# Patient Record
Sex: MALE | Race: WHITE | NOT HISPANIC OR LATINO | Employment: OTHER | URBAN - METROPOLITAN AREA
[De-identification: names, ages, dates, MRNs, and addresses within clinical notes are randomized per-mention and may not be internally consistent; named-entity substitution may affect disease eponyms.]

---

## 2017-07-25 ENCOUNTER — HOSPITAL ENCOUNTER (OUTPATIENT)
Dept: CARDIOLOGY | Facility: HOSPITAL | Age: 77
Discharge: HOME | End: 2017-07-25
Attending: ANESTHESIOLOGY | Admitting: ANESTHESIOLOGY

## 2017-07-25 LAB
ABO + RH BLD: NORMAL
ANION GAP SERPL CALC-SCNC: 13.2 MMOL/L (ref 10–20)
ARMBAND: NORMAL
BASOPHILS # BLD AUTO: 0 10*3/UL (ref 0–0.2)
BASOPHILS NFR BLD AUTO: 0 % (ref 0–2)
BLD COMPONENT TYPE: NORMAL
BLD GP AB SCN SERPL QL: NEGATIVE
BUN SERPL-MCNC: 16 MG/DL (ref 8–20)
BUN/CREAT SERPL: 20 (ref 6.2–20.3)
CALCIUM SERPL-MCNC: 9.3 MG/DL (ref 8.9–10.3)
CHLORIDE SERPL-SCNC: 104 MMOL/L (ref 101–111)
CONV CO2: 26 MMOL/L (ref 22–32)
CREAT UR-MCNC: 0.8 MG/DL (ref 0.7–1.2)
CROSSMATCH EXPIRATION: NORMAL
DIFFERENTIAL METHOD BLD: (no result)
EOSINOPHIL # BLD AUTO: 0.1 10*3/UL (ref 0–0.3)
EOSINOPHIL # BLD AUTO: 2 % (ref 0–3)
ERYTHROCYTE [DISTWIDTH] IN BLOOD BY AUTOMATED COUNT: 12.9 % (ref 11.5–14.5)
GLUCOSE SERPL-MCNC: 187 MG/DL (ref 65–99)
HCT VFR BLD AUTO: 42 % (ref 40–54)
HGB BLD-MCNC: 14.3 G/DL (ref 14–18)
LYMPHOCYTES # BLD AUTO: 0.9 10*3/UL (ref 0.8–4.8)
LYMPHOCYTES NFR BLD AUTO: 17 % (ref 18–42)
MCH RBC QN AUTO: 32.1 PG (ref 26–32)
MCHC RBC AUTO-ENTMCNC: 34 G/DL (ref 32–36)
MCV RBC AUTO: 94.4 FL (ref 80–94)
MONOCYTES # BLD AUTO: 0.6 10*3/UL (ref 0.1–1.3)
MONOCYTES NFR BLD AUTO: 10 % (ref 2–11)
NEUTROPHILS # BLD AUTO: 3.8 10*3/UL (ref 2.3–8.6)
NEUTROPHILS NFR BLD AUTO: 71 % (ref 50–75)
NRBC BLD AUTO-RTO: 0 /100{WBCS}
NRBC/RBC NFR BLD MANUAL: 0 10*3/UL
PLATELET # BLD AUTO: 178 10*3/UL (ref 150–450)
PMV BLD AUTO: 8.8 FL (ref 7.4–10.4)
POTASSIUM SERPL-SCNC: 4.2 MMOL/L (ref 3.6–5.1)
RBC # BLD AUTO: 4.45 10*6/UL (ref 4.6–6)
SODIUM SERPL-SCNC: 139 MMOL/L (ref 136–144)
WBC # BLD AUTO: 5.4 10*3/UL (ref 4.5–11.5)

## 2017-08-01 ENCOUNTER — HOSPITAL ENCOUNTER (OUTPATIENT)
Dept: OTHER | Facility: HOSPITAL | Age: 77
Setting detail: SPECIMEN
Discharge: HOME | End: 2017-08-01
Attending: UROLOGY | Admitting: UROLOGY

## 2017-08-02 ENCOUNTER — HOSPITAL ENCOUNTER (OUTPATIENT)
Dept: OTHER | Facility: HOSPITAL | Age: 77
Setting detail: SPECIMEN
Discharge: HOME | End: 2017-08-02
Attending: UROLOGY | Admitting: UROLOGY

## 2017-08-02 LAB — SPECIMEN SOURCE: NORMAL

## 2017-12-12 ENCOUNTER — OFFICE (AMBULATORY)
Dept: URBAN - METROPOLITAN AREA CLINIC 64 | Facility: CLINIC | Age: 77
End: 2017-12-12
Payer: COMMERCIAL

## 2017-12-12 VITALS
WEIGHT: 243 LBS | HEIGHT: 76 IN | DIASTOLIC BLOOD PRESSURE: 90 MMHG | HEART RATE: 74 BPM | SYSTOLIC BLOOD PRESSURE: 162 MMHG

## 2017-12-12 DIAGNOSIS — R19.4 CHANGE IN BOWEL HABIT: ICD-10-CM

## 2017-12-12 DIAGNOSIS — R33.9 RETENTION OF URINE, UNSPECIFIED: ICD-10-CM

## 2017-12-12 PROCEDURE — 99213 OFFICE O/P EST LOW 20 MIN: CPT | Performed by: NURSE PRACTITIONER

## 2018-02-12 ENCOUNTER — OFFICE (AMBULATORY)
Dept: URBAN - METROPOLITAN AREA CLINIC 64 | Facility: CLINIC | Age: 78
End: 2018-02-12
Payer: COMMERCIAL

## 2018-02-12 VITALS
WEIGHT: 245 LBS | HEIGHT: 76 IN | DIASTOLIC BLOOD PRESSURE: 86 MMHG | SYSTOLIC BLOOD PRESSURE: 136 MMHG | HEART RATE: 83 BPM

## 2018-02-12 DIAGNOSIS — R19.4 CHANGE IN BOWEL HABIT: ICD-10-CM

## 2018-02-12 PROCEDURE — 99213 OFFICE O/P EST LOW 20 MIN: CPT | Performed by: NURSE PRACTITIONER

## 2020-01-22 ENCOUNTER — OFFICE (AMBULATORY)
Dept: URBAN - METROPOLITAN AREA CLINIC 64 | Facility: CLINIC | Age: 80
End: 2020-01-22
Payer: COMMERCIAL

## 2020-01-22 VITALS
DIASTOLIC BLOOD PRESSURE: 81 MMHG | SYSTOLIC BLOOD PRESSURE: 163 MMHG | WEIGHT: 239 LBS | HEIGHT: 76 IN | HEART RATE: 96 BPM

## 2020-01-22 DIAGNOSIS — R19.4 CHANGE IN BOWEL HABIT: ICD-10-CM

## 2020-01-22 DIAGNOSIS — Z86.010 PERSONAL HISTORY OF COLONIC POLYPS: ICD-10-CM

## 2020-01-22 DIAGNOSIS — R93.3 ABNORMAL FINDINGS ON DIAGNOSTIC IMAGING OF OTHER PARTS OF DI: ICD-10-CM

## 2020-01-22 PROCEDURE — 99214 OFFICE O/P EST MOD 30 MIN: CPT | Performed by: NURSE PRACTITIONER

## 2020-01-22 RX ORDER — RIFAXIMIN 550 MG/1
1650 TABLET ORAL
Qty: 42 | Refills: 0 | Status: COMPLETED
Start: 2020-01-22 | End: 2022-01-28

## 2020-02-13 ENCOUNTER — OFFICE (AMBULATORY)
Dept: URBAN - METROPOLITAN AREA PATHOLOGY 4 | Facility: PATHOLOGY | Age: 80
End: 2020-02-13
Payer: MEDICARE

## 2020-02-13 ENCOUNTER — OFFICE (AMBULATORY)
Dept: URBAN - METROPOLITAN AREA PATHOLOGY 4 | Facility: PATHOLOGY | Age: 80
End: 2020-02-13

## 2020-02-13 ENCOUNTER — ON CAMPUS - OUTPATIENT (AMBULATORY)
Dept: URBAN - METROPOLITAN AREA HOSPITAL 2 | Facility: HOSPITAL | Age: 80
End: 2020-02-13
Payer: COMMERCIAL

## 2020-02-13 VITALS
DIASTOLIC BLOOD PRESSURE: 58 MMHG | SYSTOLIC BLOOD PRESSURE: 129 MMHG | DIASTOLIC BLOOD PRESSURE: 72 MMHG | HEART RATE: 89 BPM | HEIGHT: 76 IN | RESPIRATION RATE: 18 BRPM | DIASTOLIC BLOOD PRESSURE: 59 MMHG | HEART RATE: 94 BPM | DIASTOLIC BLOOD PRESSURE: 65 MMHG | HEART RATE: 90 BPM | DIASTOLIC BLOOD PRESSURE: 56 MMHG | SYSTOLIC BLOOD PRESSURE: 103 MMHG | HEART RATE: 91 BPM | WEIGHT: 233 LBS | SYSTOLIC BLOOD PRESSURE: 93 MMHG | OXYGEN SATURATION: 94 % | HEART RATE: 86 BPM | SYSTOLIC BLOOD PRESSURE: 92 MMHG | HEART RATE: 82 BPM | SYSTOLIC BLOOD PRESSURE: 99 MMHG | HEART RATE: 95 BPM | HEART RATE: 70 BPM | RESPIRATION RATE: 16 BRPM | DIASTOLIC BLOOD PRESSURE: 52 MMHG | SYSTOLIC BLOOD PRESSURE: 151 MMHG | DIASTOLIC BLOOD PRESSURE: 76 MMHG | DIASTOLIC BLOOD PRESSURE: 69 MMHG | OXYGEN SATURATION: 92 % | DIASTOLIC BLOOD PRESSURE: 73 MMHG | OXYGEN SATURATION: 96 % | SYSTOLIC BLOOD PRESSURE: 85 MMHG | TEMPERATURE: 97.6 F | OXYGEN SATURATION: 95 %

## 2020-02-13 DIAGNOSIS — K57.30 DIVERTICULOSIS OF LARGE INTESTINE WITHOUT PERFORATION OR ABS: ICD-10-CM

## 2020-02-13 DIAGNOSIS — K29.60 OTHER GASTRITIS WITHOUT BLEEDING: ICD-10-CM

## 2020-02-13 DIAGNOSIS — R19.4 CHANGE IN BOWEL HABIT: ICD-10-CM

## 2020-02-13 DIAGNOSIS — K21.0 GASTRO-ESOPHAGEAL REFLUX DISEASE WITH ESOPHAGITIS: ICD-10-CM

## 2020-02-13 DIAGNOSIS — R93.3 ABNORMAL FINDINGS ON DIAGNOSTIC IMAGING OF OTHER PARTS OF DI: ICD-10-CM

## 2020-02-13 DIAGNOSIS — K44.9 DIAPHRAGMATIC HERNIA WITHOUT OBSTRUCTION OR GANGRENE: ICD-10-CM

## 2020-02-13 DIAGNOSIS — Z86.010 PERSONAL HISTORY OF COLONIC POLYPS: ICD-10-CM

## 2020-02-13 PROBLEM — K22.70 BARRETT'S ESOPHAGUS WITHOUT DYSPLASIA: Status: ACTIVE | Noted: 2020-02-13

## 2020-02-13 PROBLEM — K29.70 GASTRITIS, UNSPECIFIED, WITHOUT BLEEDING: Status: ACTIVE | Noted: 2020-02-13

## 2020-02-13 LAB
GI HISTOLOGY: A. SELECT: (no result)
GI HISTOLOGY: B. UNSPECIFIED: (no result)
GI HISTOLOGY: PDF REPORT: (no result)

## 2020-02-13 PROCEDURE — 88305 TISSUE EXAM BY PATHOLOGIST: CPT | Mod: 26 | Performed by: INTERNAL MEDICINE

## 2020-02-13 PROCEDURE — 88305 TISSUE EXAM BY PATHOLOGIST: CPT | Performed by: INTERNAL MEDICINE

## 2020-02-13 PROCEDURE — 45378 DIAGNOSTIC COLONOSCOPY: CPT | Performed by: INTERNAL MEDICINE

## 2020-02-13 PROCEDURE — 43239 EGD BIOPSY SINGLE/MULTIPLE: CPT | Performed by: INTERNAL MEDICINE

## 2020-02-13 RX ORDER — PANTOPRAZOLE SODIUM 20 MG/1
20 TABLET, DELAYED RELEASE ORAL
Qty: 90 | Refills: 3 | Status: ACTIVE
Start: 2020-02-13

## 2020-03-17 PROCEDURE — 82365 CALCULUS SPECTROSCOPY: CPT

## 2020-03-18 ENCOUNTER — LAB REQUISITION (OUTPATIENT)
Dept: LAB | Facility: HOSPITAL | Age: 80
End: 2020-03-18

## 2020-03-18 DIAGNOSIS — N20.1 CALCULUS OF URETER: ICD-10-CM

## 2020-04-01 LAB
COLOR STONE: NORMAL
COMPN STONE: NORMAL
LABORATORY COMMENT REPORT: NORMAL
Lab: NORMAL
Lab: NORMAL
PHOTO: NORMAL
SIZE STONE: NORMAL MM
SPECIMEN SOURCE: NORMAL
URATE MFR STONE: 100 %
WT STONE: 260 MG

## 2020-04-02 ENCOUNTER — OFFICE (AMBULATORY)
Dept: URBAN - METROPOLITAN AREA TELEHEALTH 4 | Facility: TELEHEALTH | Age: 80
End: 2020-04-02
Payer: COMMERCIAL

## 2020-04-02 VITALS — HEIGHT: 76 IN

## 2020-04-02 DIAGNOSIS — R93.3 ABNORMAL FINDINGS ON DIAGNOSTIC IMAGING OF OTHER PARTS OF DI: ICD-10-CM

## 2020-04-02 DIAGNOSIS — K59.1 FUNCTIONAL DIARRHEA: ICD-10-CM

## 2020-04-02 PROCEDURE — G2012 BRIEF CHECK IN BY MD/QHP: HCPCS | Performed by: INTERNAL MEDICINE

## 2021-09-28 NOTE — PROGRESS NOTES
EMG and Nerve Conduction Studies    The complete report includes the data sheets.     Date of Study:9-    Referring Provider:Dr. Patino    History:    This patient is a 80-year-old male with a history of non-insulin-dependent diabetes who complains of numbness in both feet up to about mid calf.      Results:    1.  The sural sensory study was attempted bilaterally in the medial plantar sensory study on the right no nerve action potentials were recorded.    2.  The right peroneal motor distal latency and conduction velocities and amplitudes were normal.  The left peroneal motor distal latency is prolonged with markedly reduced compound muscle potential amplitudes with normal conduction velocities.    3.  The tibial motor distal latencies were normal bilaterally the compound muscle action potential amplitude was mildly reduced on the right and markedly reduced on the left with essentially normal conduction velocity.    4.  The F-wave latencies were prolonged on the left tibial and bilateral peroneal studies however the patient is 6 foot 4 inches tall therefore this is of questionable clinical significance.    5.  EMG of the vastus lateralis muscles were normal bilaterally.  There were neurogenic changes in the muscles in the leg with increased insertional activity occasional spontaneous potentials and decreased recruitment.  The extensor digitorum brevis muscles showed significant neurogenic change bilaterally.    Impression:    This is an abnormal study.  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  September 30, 2021

## 2021-09-30 ENCOUNTER — PROCEDURE VISIT (OUTPATIENT)
Dept: NEUROLOGY | Facility: CLINIC | Age: 81
End: 2021-09-30

## 2021-09-30 VITALS
HEART RATE: 70 BPM | TEMPERATURE: 97.3 F | HEIGHT: 75 IN | BODY MASS INDEX: 28.6 KG/M2 | WEIGHT: 230 LBS | DIASTOLIC BLOOD PRESSURE: 75 MMHG | SYSTOLIC BLOOD PRESSURE: 177 MMHG

## 2021-09-30 DIAGNOSIS — G60.8 POLYNEUROPATHY, PERIPHERAL SENSORIMOTOR AXONAL: Primary | ICD-10-CM

## 2021-09-30 PROCEDURE — 95910 NRV CNDJ TEST 7-8 STUDIES: CPT | Performed by: PSYCHIATRY & NEUROLOGY

## 2021-09-30 PROCEDURE — 95886 MUSC TEST DONE W/N TEST COMP: CPT | Performed by: PSYCHIATRY & NEUROLOGY

## 2021-09-30 RX ORDER — MELOXICAM 15 MG/1
15 TABLET ORAL DAILY
Status: ON HOLD | COMMUNITY
End: 2022-12-07

## 2021-09-30 RX ORDER — ASPIRIN 81 MG/1
81 TABLET, CHEWABLE ORAL DAILY
Status: ON HOLD | COMMUNITY
End: 2022-12-07

## 2021-09-30 RX ORDER — OMEPRAZOLE 20 MG/1
20 CAPSULE, DELAYED RELEASE ORAL DAILY
Status: ON HOLD | COMMUNITY
End: 2022-12-07

## 2021-09-30 RX ORDER — ATENOLOL 25 MG/1
25 TABLET ORAL DAILY
Status: ON HOLD | COMMUNITY
End: 2022-12-07

## 2021-09-30 RX ORDER — GABAPENTIN 300 MG/1
600 CAPSULE ORAL 2 TIMES DAILY
COMMUNITY

## 2021-09-30 RX ORDER — GLIMEPIRIDE 2 MG/1
2 TABLET ORAL DAILY
COMMUNITY
Start: 2013-03-21

## 2022-01-28 ENCOUNTER — OFFICE (AMBULATORY)
Dept: URBAN - METROPOLITAN AREA CLINIC 64 | Facility: CLINIC | Age: 82
End: 2022-01-28

## 2022-01-28 VITALS
DIASTOLIC BLOOD PRESSURE: 81 MMHG | SYSTOLIC BLOOD PRESSURE: 132 MMHG | HEIGHT: 76 IN | WEIGHT: 237 LBS | HEART RATE: 74 BPM

## 2022-01-28 DIAGNOSIS — R15.2 FECAL URGENCY: ICD-10-CM

## 2022-01-28 PROCEDURE — 99213 OFFICE O/P EST LOW 20 MIN: CPT | Performed by: NURSE PRACTITIONER

## 2022-01-28 RX ORDER — METRONIDAZOLE 500 MG/1
1500 TABLET, FILM COATED ORAL
Qty: 30 | Refills: 0 | Status: ACTIVE
Start: 2022-01-28

## 2022-03-25 ENCOUNTER — OFFICE (AMBULATORY)
Dept: URBAN - METROPOLITAN AREA CLINIC 64 | Facility: CLINIC | Age: 82
End: 2022-03-25

## 2022-03-25 VITALS
SYSTOLIC BLOOD PRESSURE: 175 MMHG | HEART RATE: 69 BPM | WEIGHT: 242 LBS | HEIGHT: 76 IN | DIASTOLIC BLOOD PRESSURE: 102 MMHG

## 2022-03-25 DIAGNOSIS — R15.2 FECAL URGENCY: ICD-10-CM

## 2022-03-25 PROCEDURE — 99213 OFFICE O/P EST LOW 20 MIN: CPT | Performed by: NURSE PRACTITIONER

## 2022-05-24 ENCOUNTER — TRANSCRIBE ORDERS (OUTPATIENT)
Dept: ADMINISTRATIVE | Facility: HOSPITAL | Age: 82
End: 2022-05-24

## 2022-05-24 ENCOUNTER — HOSPITAL ENCOUNTER (OUTPATIENT)
Dept: CARDIOLOGY | Facility: HOSPITAL | Age: 82
Discharge: HOME | End: 2022-05-24

## 2022-05-24 ENCOUNTER — LAB (OUTPATIENT)
Dept: LAB | Facility: HOSPITAL | Age: 82
End: 2022-05-24

## 2022-05-24 DIAGNOSIS — M16.12 PRIMARY OSTEOARTHRITIS OF LEFT HIP: ICD-10-CM

## 2022-05-24 DIAGNOSIS — Z01.812 PRE-OPERATIVE LABORATORY EXAMINATION: ICD-10-CM

## 2022-05-24 DIAGNOSIS — M16.12 PRIMARY OSTEOARTHRITIS OF LEFT HIP: Primary | ICD-10-CM

## 2022-05-24 LAB
ANION GAP SERPL CALCULATED.3IONS-SCNC: 11.2 MMOL/L (ref 5–15)
BASOPHILS # BLD AUTO: 0.03 10*3/MM3 (ref 0–0.2)
BASOPHILS NFR BLD AUTO: 0.5 % (ref 0–1.5)
BUN SERPL-MCNC: 20 MG/DL (ref 8–23)
BUN/CREAT SERPL: 13.7 (ref 7–25)
CALCIUM SPEC-SCNC: 9.6 MG/DL (ref 8.6–10.5)
CHLORIDE SERPL-SCNC: 102 MMOL/L (ref 98–107)
CO2 SERPL-SCNC: 25.8 MMOL/L (ref 22–29)
CREAT SERPL-MCNC: 1.46 MG/DL (ref 0.76–1.27)
DEPRECATED RDW RBC AUTO: 45.3 FL (ref 37–54)
EGFRCR SERPLBLD CKD-EPI 2021: 48.6 ML/MIN/1.73
EOSINOPHIL # BLD AUTO: 0.07 10*3/MM3 (ref 0–0.4)
EOSINOPHIL NFR BLD AUTO: 1.2 % (ref 0.3–6.2)
ERYTHROCYTE [DISTWIDTH] IN BLOOD BY AUTOMATED COUNT: 12.5 % (ref 12.3–15.4)
GLUCOSE SERPL-MCNC: 181 MG/DL (ref 65–99)
HCT VFR BLD AUTO: 42.5 % (ref 37.5–51)
HGB BLD-MCNC: 13.9 G/DL (ref 13–17.7)
IMM GRANULOCYTES # BLD AUTO: 0.02 10*3/MM3 (ref 0–0.05)
IMM GRANULOCYTES NFR BLD AUTO: 0.4 % (ref 0–0.5)
LYMPHOCYTES # BLD AUTO: 1.08 10*3/MM3 (ref 0.7–3.1)
LYMPHOCYTES NFR BLD AUTO: 18.9 % (ref 19.6–45.3)
MCH RBC QN AUTO: 31.7 PG (ref 26.6–33)
MCHC RBC AUTO-ENTMCNC: 32.7 G/DL (ref 31.5–35.7)
MCV RBC AUTO: 96.8 FL (ref 79–97)
MONOCYTES # BLD AUTO: 0.62 10*3/MM3 (ref 0.1–0.9)
MONOCYTES NFR BLD AUTO: 10.9 % (ref 5–12)
NEUTROPHILS NFR BLD AUTO: 3.88 10*3/MM3 (ref 1.7–7)
NEUTROPHILS NFR BLD AUTO: 68.1 % (ref 42.7–76)
NRBC BLD AUTO-RTO: 0 /100 WBC (ref 0–0.2)
PLATELET # BLD AUTO: 207 10*3/MM3 (ref 140–450)
PMV BLD AUTO: 10.9 FL (ref 6–12)
POTASSIUM SERPL-SCNC: 4.5 MMOL/L (ref 3.5–5.2)
RBC # BLD AUTO: 4.39 10*6/MM3 (ref 4.14–5.8)
SODIUM SERPL-SCNC: 139 MMOL/L (ref 136–145)
WBC NRBC COR # BLD: 5.7 10*3/MM3 (ref 3.4–10.8)

## 2022-05-24 PROCEDURE — 80048 BASIC METABOLIC PNL TOTAL CA: CPT

## 2022-05-24 PROCEDURE — 93005 ELECTROCARDIOGRAM TRACING: CPT | Performed by: ORTHOPAEDIC SURGERY

## 2022-05-24 PROCEDURE — 85025 COMPLETE CBC W/AUTO DIFF WBC: CPT

## 2022-05-24 PROCEDURE — 36415 COLL VENOUS BLD VENIPUNCTURE: CPT

## 2022-05-24 PROCEDURE — 93010 ELECTROCARDIOGRAM REPORT: CPT | Performed by: INTERNAL MEDICINE

## 2022-05-25 LAB — QT INTERVAL: 389 MS

## 2022-12-07 ENCOUNTER — HOSPITAL ENCOUNTER (INPATIENT)
Facility: HOSPITAL | Age: 82
LOS: 2 days | Discharge: HOME | End: 2022-12-09
Attending: EMERGENCY MEDICINE | Admitting: INTERNAL MEDICINE

## 2022-12-07 ENCOUNTER — APPOINTMENT (OUTPATIENT)
Dept: GENERAL RADIOLOGY | Facility: HOSPITAL | Age: 82
End: 2022-12-07

## 2022-12-07 DIAGNOSIS — I21.4 NSTEMI (NON-ST ELEVATED MYOCARDIAL INFARCTION): Primary | ICD-10-CM

## 2022-12-07 LAB
ALBUMIN SERPL-MCNC: 4.38 G/DL (ref 3.5–5.2)
ALBUMIN/GLOB SERPL: 1.4 G/DL
ALP SERPL-CCNC: 78 U/L (ref 39–117)
ALT SERPL W P-5'-P-CCNC: 18 U/L (ref 1–41)
ANION GAP SERPL CALCULATED.3IONS-SCNC: 9.2 MMOL/L (ref 5–15)
APTT PPP: 27.8 SECONDS (ref 61–76.5)
APTT PPP: 33 SECONDS (ref 61–76.5)
AST SERPL-CCNC: 25 U/L (ref 1–40)
BASOPHILS # BLD AUTO: 0.04 10*3/MM3 (ref 0–0.2)
BASOPHILS NFR BLD AUTO: 0.6 % (ref 0–1.5)
BILIRUB SERPL-MCNC: 0.6 MG/DL (ref 0–1.2)
BUN SERPL-MCNC: 22 MG/DL (ref 8–23)
BUN/CREAT SERPL: 14.9 (ref 7–25)
CALCIUM SPEC-SCNC: 9.7 MG/DL (ref 8.6–10.5)
CHLORIDE SERPL-SCNC: 101 MMOL/L (ref 98–107)
CO2 SERPL-SCNC: 26.8 MMOL/L (ref 22–29)
CREAT SERPL-MCNC: 1.48 MG/DL (ref 0.76–1.27)
DEPRECATED RDW RBC AUTO: 46.1 FL (ref 37–54)
EGFRCR SERPLBLD CKD-EPI 2021: 47.5 ML/MIN/1.73
EOSINOPHIL # BLD AUTO: 0.11 10*3/MM3 (ref 0–0.4)
EOSINOPHIL NFR BLD AUTO: 1.8 % (ref 0.3–6.2)
ERYTHROCYTE [DISTWIDTH] IN BLOOD BY AUTOMATED COUNT: 14.4 % (ref 12.3–15.4)
GLOBULIN UR ELPH-MCNC: 3.2 GM/DL
GLUCOSE BLDC GLUCOMTR-MCNC: 91 MG/DL (ref 70–105)
GLUCOSE SERPL-MCNC: 204 MG/DL (ref 65–99)
HCT VFR BLD AUTO: 42 % (ref 37.5–51)
HGB BLD-MCNC: 14.2 G/DL (ref 13–17.7)
IMM GRANULOCYTES # BLD AUTO: 0 10*3/MM3 (ref 0–0.05)
IMM GRANULOCYTES NFR BLD AUTO: 0 % (ref 0–0.5)
INR PPP: 1.06 (ref 0.93–1.1)
LYMPHOCYTES # BLD AUTO: 1.09 10*3/MM3 (ref 0.7–3.1)
LYMPHOCYTES NFR BLD AUTO: 17.4 % (ref 19.6–45.3)
MCH RBC QN AUTO: 29.5 PG (ref 26.6–33)
MCHC RBC AUTO-ENTMCNC: 33.8 G/DL (ref 31.5–35.7)
MCV RBC AUTO: 87.1 FL (ref 79–97)
MONOCYTES # BLD AUTO: 0.83 10*3/MM3 (ref 0.1–0.9)
MONOCYTES NFR BLD AUTO: 13.3 % (ref 5–12)
NEUTROPHILS NFR BLD AUTO: 4.18 10*3/MM3 (ref 1.7–7)
NEUTROPHILS NFR BLD AUTO: 66.9 % (ref 42.7–76)
PLATELET # BLD AUTO: 216 10*3/MM3 (ref 140–450)
PMV BLD AUTO: 10.3 FL (ref 6–12)
POTASSIUM SERPL-SCNC: 4.3 MMOL/L (ref 3.5–5.2)
PROT SERPL-MCNC: 7.6 G/DL (ref 6–8.5)
PROTHROMBIN TIME: 10.9 SECONDS (ref 9.6–11.7)
QT INTERVAL: 398 MS
RBC # BLD AUTO: 4.82 10*6/MM3 (ref 4.14–5.8)
SODIUM SERPL-SCNC: 137 MMOL/L (ref 136–145)
TROPONIN T SERPL-MCNC: 0.32 NG/ML (ref 0–0.03)
TROPONIN T SERPL-MCNC: 0.37 NG/ML (ref 0–0.03)
TSH SERPL DL<=0.05 MIU/L-ACNC: 1.2 UIU/ML (ref 0.27–4.2)
WBC NRBC COR # BLD: 6.25 10*3/MM3 (ref 3.4–10.8)

## 2022-12-07 PROCEDURE — 85730 THROMBOPLASTIN TIME PARTIAL: CPT | Performed by: EMERGENCY MEDICINE

## 2022-12-07 PROCEDURE — 93010 ELECTROCARDIOGRAM REPORT: CPT | Performed by: EMERGENCY MEDICINE

## 2022-12-07 PROCEDURE — 80053 COMPREHEN METABOLIC PANEL: CPT | Performed by: EMERGENCY MEDICINE

## 2022-12-07 PROCEDURE — 85025 COMPLETE CBC W/AUTO DIFF WBC: CPT

## 2022-12-07 PROCEDURE — 84443 ASSAY THYROID STIM HORMONE: CPT | Performed by: INTERNAL MEDICINE

## 2022-12-07 PROCEDURE — 99285 EMERGENCY DEPT VISIT HI MDM: CPT | Performed by: EMERGENCY MEDICINE

## 2022-12-07 PROCEDURE — 84484 ASSAY OF TROPONIN QUANT: CPT | Performed by: EMERGENCY MEDICINE

## 2022-12-07 PROCEDURE — 25010000002 HEPARIN (PORCINE) 25000-0.45 UT/250ML-% SOLUTION: Performed by: EMERGENCY MEDICINE

## 2022-12-07 PROCEDURE — 71045 X-RAY EXAM CHEST 1 VIEW: CPT

## 2022-12-07 PROCEDURE — 36415 COLL VENOUS BLD VENIPUNCTURE: CPT

## 2022-12-07 PROCEDURE — 99284 EMERGENCY DEPT VISIT MOD MDM: CPT

## 2022-12-07 PROCEDURE — 99223 1ST HOSP IP/OBS HIGH 75: CPT | Performed by: INTERNAL MEDICINE

## 2022-12-07 PROCEDURE — 85610 PROTHROMBIN TIME: CPT | Performed by: EMERGENCY MEDICINE

## 2022-12-07 PROCEDURE — 82962 GLUCOSE BLOOD TEST: CPT

## 2022-12-07 PROCEDURE — 85730 THROMBOPLASTIN TIME PARTIAL: CPT | Performed by: INTERNAL MEDICINE

## 2022-12-07 PROCEDURE — 93005 ELECTROCARDIOGRAM TRACING: CPT | Performed by: EMERGENCY MEDICINE

## 2022-12-07 RX ORDER — ALUMINA, MAGNESIA, AND SIMETHICONE 2400; 2400; 240 MG/30ML; MG/30ML; MG/30ML
15 SUSPENSION ORAL EVERY 6 HOURS PRN
Status: DISCONTINUED | OUTPATIENT
Start: 2022-12-07 | End: 2022-12-09 | Stop reason: HOSPADM

## 2022-12-07 RX ORDER — INSULIN LISPRO 100 [IU]/ML
2-7 INJECTION, SOLUTION INTRAVENOUS; SUBCUTANEOUS
Status: DISCONTINUED | OUTPATIENT
Start: 2022-12-07 | End: 2022-12-09 | Stop reason: HOSPADM

## 2022-12-07 RX ORDER — METOPROLOL SUCCINATE 25 MG/1
25 TABLET, EXTENDED RELEASE ORAL
Status: DISCONTINUED | OUTPATIENT
Start: 2022-12-08 | End: 2022-12-09 | Stop reason: HOSPADM

## 2022-12-07 RX ORDER — ACETAMINOPHEN 650 MG/1
650 SUPPOSITORY RECTAL EVERY 4 HOURS PRN
Status: DISCONTINUED | OUTPATIENT
Start: 2022-12-07 | End: 2022-12-09 | Stop reason: HOSPADM

## 2022-12-07 RX ORDER — NITROGLYCERIN 0.4 MG/1
0.4 TABLET SUBLINGUAL
Status: DISCONTINUED | OUTPATIENT
Start: 2022-12-07 | End: 2022-12-09 | Stop reason: HOSPADM

## 2022-12-07 RX ORDER — FUROSEMIDE 10 MG/ML
40 INJECTION INTRAMUSCULAR; INTRAVENOUS ONCE
Status: DISCONTINUED | OUTPATIENT
Start: 2022-12-07 | End: 2022-12-07

## 2022-12-07 RX ORDER — SODIUM CHLORIDE 9 MG/ML
40 INJECTION, SOLUTION INTRAVENOUS AS NEEDED
Status: DISCONTINUED | OUTPATIENT
Start: 2022-12-07 | End: 2022-12-09 | Stop reason: HOSPADM

## 2022-12-07 RX ORDER — ONDANSETRON 2 MG/ML
4 INJECTION INTRAMUSCULAR; INTRAVENOUS EVERY 6 HOURS PRN
Status: DISCONTINUED | OUTPATIENT
Start: 2022-12-07 | End: 2022-12-09 | Stop reason: HOSPADM

## 2022-12-07 RX ORDER — ATORVASTATIN CALCIUM 40 MG/1
40 TABLET, FILM COATED ORAL NIGHTLY
Status: DISCONTINUED | OUTPATIENT
Start: 2022-12-07 | End: 2022-12-09 | Stop reason: HOSPADM

## 2022-12-07 RX ORDER — ASPIRIN 325 MG
325 TABLET ORAL ONCE
Status: COMPLETED | OUTPATIENT
Start: 2022-12-07 | End: 2022-12-07

## 2022-12-07 RX ORDER — SODIUM CHLORIDE 0.9 % (FLUSH) 0.9 %
10 SYRINGE (ML) INJECTION EVERY 12 HOURS SCHEDULED
Status: DISCONTINUED | OUTPATIENT
Start: 2022-12-07 | End: 2022-12-09 | Stop reason: HOSPADM

## 2022-12-07 RX ORDER — MAGNESIUM SULFATE 1 G/100ML
1 INJECTION INTRAVENOUS AS NEEDED
Status: DISCONTINUED | OUTPATIENT
Start: 2022-12-07 | End: 2022-12-09 | Stop reason: HOSPADM

## 2022-12-07 RX ORDER — HEPARIN SODIUM 10000 [USP'U]/100ML
9.9 INJECTION, SOLUTION INTRAVENOUS
Status: DISCONTINUED | OUTPATIENT
Start: 2022-12-07 | End: 2022-12-09

## 2022-12-07 RX ORDER — MAGNESIUM SULFATE HEPTAHYDRATE 40 MG/ML
2 INJECTION, SOLUTION INTRAVENOUS AS NEEDED
Status: DISCONTINUED | OUTPATIENT
Start: 2022-12-07 | End: 2022-12-09 | Stop reason: HOSPADM

## 2022-12-07 RX ORDER — OLANZAPINE 10 MG/2ML
1 INJECTION, POWDER, LYOPHILIZED, FOR SOLUTION INTRAMUSCULAR
Status: DISCONTINUED | OUTPATIENT
Start: 2022-12-07 | End: 2022-12-09 | Stop reason: HOSPADM

## 2022-12-07 RX ORDER — ASCORBIC ACID 500 MG
500 TABLET ORAL DAILY
COMMUNITY

## 2022-12-07 RX ORDER — SODIUM CHLORIDE 0.9 % (FLUSH) 0.9 %
10 SYRINGE (ML) INJECTION AS NEEDED
Status: DISCONTINUED | OUTPATIENT
Start: 2022-12-07 | End: 2022-12-09 | Stop reason: HOSPADM

## 2022-12-07 RX ORDER — ONDANSETRON 4 MG/1
4 TABLET, FILM COATED ORAL EVERY 6 HOURS PRN
Status: DISCONTINUED | OUTPATIENT
Start: 2022-12-07 | End: 2022-12-09 | Stop reason: HOSPADM

## 2022-12-07 RX ORDER — ASCORBIC ACID 500 MG
500 TABLET ORAL DAILY
Status: DISCONTINUED | OUTPATIENT
Start: 2022-12-07 | End: 2022-12-09 | Stop reason: HOSPADM

## 2022-12-07 RX ORDER — POLYETHYLENE GLYCOL 3350 17 G/17G
17 POWDER, FOR SOLUTION ORAL DAILY PRN
Status: DISCONTINUED | OUTPATIENT
Start: 2022-12-07 | End: 2022-12-09 | Stop reason: HOSPADM

## 2022-12-07 RX ORDER — ASPIRIN 81 MG/1
81 TABLET ORAL DAILY
Status: DISCONTINUED | OUTPATIENT
Start: 2022-12-08 | End: 2022-12-09 | Stop reason: HOSPADM

## 2022-12-07 RX ORDER — ACETAMINOPHEN 325 MG/1
650 TABLET ORAL EVERY 4 HOURS PRN
Status: DISCONTINUED | OUTPATIENT
Start: 2022-12-07 | End: 2022-12-09 | Stop reason: HOSPADM

## 2022-12-07 RX ORDER — SODIUM CHLORIDE 0.9 % (FLUSH) 0.9 %
3-10 SYRINGE (ML) INJECTION AS NEEDED
Status: DISCONTINUED | OUTPATIENT
Start: 2022-12-07 | End: 2022-12-09 | Stop reason: HOSPADM

## 2022-12-07 RX ORDER — IPRATROPIUM BROMIDE AND ALBUTEROL SULFATE 2.5; .5 MG/3ML; MG/3ML
3 SOLUTION RESPIRATORY (INHALATION) ONCE
Status: DISCONTINUED | OUTPATIENT
Start: 2022-12-07 | End: 2022-12-07

## 2022-12-07 RX ORDER — CHOLECALCIFEROL (VITAMIN D3) 125 MCG
5 CAPSULE ORAL NIGHTLY PRN
Status: DISCONTINUED | OUTPATIENT
Start: 2022-12-07 | End: 2022-12-09 | Stop reason: HOSPADM

## 2022-12-07 RX ORDER — DEXTROSE MONOHYDRATE 25 G/50ML
25 INJECTION, SOLUTION INTRAVENOUS
Status: DISCONTINUED | OUTPATIENT
Start: 2022-12-07 | End: 2022-12-09 | Stop reason: HOSPADM

## 2022-12-07 RX ORDER — POTASSIUM CHLORIDE 20 MEQ/1
40 TABLET, EXTENDED RELEASE ORAL AS NEEDED
Status: DISCONTINUED | OUTPATIENT
Start: 2022-12-07 | End: 2022-12-09 | Stop reason: HOSPADM

## 2022-12-07 RX ORDER — BISACODYL 5 MG/1
5 TABLET, DELAYED RELEASE ORAL DAILY PRN
Status: DISCONTINUED | OUTPATIENT
Start: 2022-12-07 | End: 2022-12-09 | Stop reason: HOSPADM

## 2022-12-07 RX ORDER — AMOXICILLIN 250 MG
2 CAPSULE ORAL 2 TIMES DAILY
Status: DISCONTINUED | OUTPATIENT
Start: 2022-12-07 | End: 2022-12-09 | Stop reason: HOSPADM

## 2022-12-07 RX ORDER — ACETAMINOPHEN 160 MG/5ML
650 SOLUTION ORAL EVERY 4 HOURS PRN
Status: DISCONTINUED | OUTPATIENT
Start: 2022-12-07 | End: 2022-12-09 | Stop reason: HOSPADM

## 2022-12-07 RX ORDER — GABAPENTIN 300 MG/1
600 CAPSULE ORAL 2 TIMES DAILY
Status: DISCONTINUED | OUTPATIENT
Start: 2022-12-07 | End: 2022-12-09 | Stop reason: HOSPADM

## 2022-12-07 RX ORDER — MULTIPLE VITAMINS W/ MINERALS TAB 9MG-400MCG
1 TAB ORAL DAILY
COMMUNITY

## 2022-12-07 RX ORDER — BISACODYL 10 MG
10 SUPPOSITORY, RECTAL RECTAL DAILY PRN
Status: DISCONTINUED | OUTPATIENT
Start: 2022-12-07 | End: 2022-12-09 | Stop reason: HOSPADM

## 2022-12-07 RX ORDER — SODIUM CHLORIDE 0.9 % (FLUSH) 0.9 %
3 SYRINGE (ML) INJECTION EVERY 12 HOURS SCHEDULED
Status: DISCONTINUED | OUTPATIENT
Start: 2022-12-07 | End: 2022-12-09 | Stop reason: HOSPADM

## 2022-12-07 RX ORDER — NICOTINE POLACRILEX 4 MG
15 LOZENGE BUCCAL
Status: DISCONTINUED | OUTPATIENT
Start: 2022-12-07 | End: 2022-12-09 | Stop reason: HOSPADM

## 2022-12-07 RX ORDER — POTASSIUM CHLORIDE 1.5 G/1.77G
40 POWDER, FOR SOLUTION ORAL AS NEEDED
Status: DISCONTINUED | OUTPATIENT
Start: 2022-12-07 | End: 2022-12-09 | Stop reason: HOSPADM

## 2022-12-07 RX ADMIN — GABAPENTIN 600 MG: 300 CAPSULE ORAL at 20:51

## 2022-12-07 RX ADMIN — METOPROLOL TARTRATE 25 MG: 25 TABLET, FILM COATED ORAL at 11:52

## 2022-12-07 RX ADMIN — HEPARIN SODIUM 9.9 UNITS/KG/HR: 10000 INJECTION, SOLUTION INTRAVENOUS at 13:11

## 2022-12-07 RX ADMIN — OXYCODONE HYDROCHLORIDE AND ACETAMINOPHEN 500 MG: 500 TABLET ORAL at 20:51

## 2022-12-07 RX ADMIN — ASPIRIN 325 MG ORAL TABLET 325 MG: 325 PILL ORAL at 11:52

## 2022-12-07 RX ADMIN — Medication 3 ML: at 20:52

## 2022-12-07 RX ADMIN — Medication 10 ML: at 20:52

## 2022-12-07 RX ADMIN — HEPARIN SODIUM 9.9 UNITS/KG/HR: 10000 INJECTION, SOLUTION INTRAVENOUS at 16:14

## 2022-12-07 RX ADMIN — SENNOSIDES AND DOCUSATE SODIUM 2 TABLET: 50; 8.6 TABLET ORAL at 20:51

## 2022-12-07 RX ADMIN — ATORVASTATIN CALCIUM 40 MG: 40 TABLET, FILM COATED ORAL at 20:51

## 2022-12-07 NOTE — ED TRIAGE NOTES
Pt reports intermittent chest pain that radiated down both arms that began Sunday while in a meeting. Today pt reports he was at the [facility] working out when the chest pain started again. Pt reports the pain is almost completely resolved at time of triage.

## 2022-12-07 NOTE — FSED PROVIDER NOTE
Subjective   History of Present Illness  Patient had an episode of chest pain earlier today that lasted a few minutes and radiated to bilateral arms, he had no diaphoresis, no nausea no vomiting no shortness of breath no other symptoms whatsoever.  He has no history of coronary artery disease, he said he did have an episode of A. fib many years ago.  He also had an episode similar to today's episode approximately a week ago when he was sitting at a meeting after eating.  Currently patient has no chest pain no nausea no vomiting no shortness of breath        Review of Systems   Constitutional: Negative for activity change, appetite change, chills, fatigue and fever.   HENT: Negative for congestion, dental problem, drooling and ear discharge.    Eyes: Negative for pain, discharge and itching.   Respiratory: Negative for apnea, cough, choking and chest tightness.    Cardiovascular: Negative for chest pain and leg swelling.   Gastrointestinal: Negative for abdominal distention, abdominal pain, anal bleeding, blood in stool, constipation and diarrhea.   Endocrine: Negative for cold intolerance, heat intolerance and polydipsia.   Genitourinary: Negative for difficulty urinating, dysuria, enuresis and flank pain.   Musculoskeletal: Negative for arthralgias, back pain, gait problem and joint swelling.   Skin: Negative for color change, pallor and rash.   Allergic/Immunologic: Negative for environmental allergies and food allergies.   Neurological: Negative for dizziness, facial asymmetry, light-headedness and headaches.   Hematological: Negative for adenopathy. Does not bruise/bleed easily.   Psychiatric/Behavioral: Negative for agitation, behavioral problems, confusion and decreased concentration.   All other systems reviewed and are negative.      No past medical history on file.    No Known Allergies    No past surgical history on file.    No family history on file.    Social History     Socioeconomic History   • Marital  status:            Objective   Physical Exam  Vitals and nursing note reviewed.   Constitutional:       General: He is not in acute distress.     Appearance: Normal appearance.   HENT:      Head: Normocephalic and atraumatic.      Nose: Nose normal.      Mouth/Throat:      Mouth: Mucous membranes are moist.   Eyes:      Extraocular Movements: Extraocular movements intact.      Pupils: Pupils are equal, round, and reactive to light.   Cardiovascular:      Rate and Rhythm: Normal rate and regular rhythm.   Pulmonary:      Effort: Pulmonary effort is normal.      Breath sounds: Normal breath sounds.   Abdominal:      General: Abdomen is flat.      Palpations: Abdomen is soft.   Musculoskeletal:      Cervical back: Normal range of motion and neck supple.   Skin:     General: Skin is warm and dry.   Neurological:      General: No focal deficit present.      Mental Status: He is alert and oriented to person, place, and time.   Psychiatric:         Mood and Affect: Mood normal.         Behavior: Behavior normal.         ECG 12 Lead      Date/Time: 12/7/2022 11:38 AM  Performed by: Micky Yuen MD  Authorized by: Micky Yuen MD   Comparison: compared with previous ECG   Rhythm: sinus rhythm  Rate: normal  Clinical impression: abnormal ECG and non-specific ECG                 ED Course                                           MDM  Number of Diagnoses or Management Options  NSTEMI (non-ST elevated myocardial infarction) (HCC)  Diagnosis management comments: Discussed case with Marcia who works with Dr. Finley and they will admit.  They are going to let me know about starting heparin or Lovenox and will call back patient is chest pain-free at this time    Discussed with Dr. Estrada cardiology aware of patient and agrees with our current plan       Amount and/or Complexity of Data Reviewed  Clinical lab tests: ordered and reviewed  Tests in the radiology section of CPT®: ordered and reviewed  Independent visualization  of images, tracings, or specimens: yes    Risk of Complications, Morbidity, and/or Mortality  Presenting problems: high  Diagnostic procedures: high  Management options: high    Critical Care  Total time providing critical care: 30-74 minutes    Patient Progress  Patient progress: improved      Final diagnoses:   NSTEMI (non-ST elevated myocardial infarction) ([facility])       ED Disposition  ED Disposition     ED Disposition   Decision to Admit    Condition   --    Comment   Level of Care: Telemetry [5]   Diagnosis: NSTEMI (non-ST elevated myocardial infarction) ([facility]) [[id number]]   Admitting Physician: ALFRED KOHLI [[id number]]   Attending Physician: ALFRED KOHLI [[id number]]   Isolate for COVID?: No [0]   Certification: I Certify That Inpatient Hospital Services Are Medically Necessary For Greater Than 2 Midnights               No follow-up provider specified.       Medication List      No changes were made to your prescriptions during this visit.

## 2022-12-07 NOTE — NURSING NOTE
Spoke with Delbert and to continue Heparin drip at current rate until Cardiology sees patient. He stated will be here to see patient in a bit.

## 2022-12-07 NOTE — H&P
[facility] Medicine Services      Patient Name: Wilfredo Mcnamara  : 1940  MRN: [id number]  Primary Care Physician:  Nigel Tinoco MD  Date of admission: 2022      Subjective      Chief Complaint: Chest pain    History of Present Illness: Wilfredo Mcnamara is a 82 y.o. male with a past medical history to include diabetes type 2 with peripheral neuropathy and hyperlipidemia who presented to [facility] on 2022 complaining of chest pain.  Patient reports that on  he began having chest pain that radiated to both arms that resolved.  He thought perhaps he had indigestion from a poor lunch.  He reports that he had no other symptoms associated with the chest pain and did not go to have evaluation.  He reports that he went to the [facility] on Monday and worked out as per usual and had no chest pains.  He states that today he did go to the [facility] and worked out and about 25 minutes into his bike ride he began having the same chest pain that radiated down both arms, this time half the intensity as the first.  He states this lasted a little while but did resolve.  He decided to go to [facility] for evaluation and it was found there that he had elevated troponins.  He is currently asymptomatic.  He denies having any nausea, vomiting, diaphoresis.  He denies classic lightheadedness, syncopal episodes or dizziness.  He denies having any shortness of air and is not having any chest pains at this time.  He denies abdomen pain, constipation or diarrhea.  He denies having lower extremity edema.  [facility] contacted us to have him admitted for further evaluation and treatment.  He was placed on a heparin drip and transported.    Emergency room work-up vital signs stable.  95% on room air.  Troponin 0.366 and 0.321.  Pro time 10.9.  INR 1.06.  PTT 27.8.  Glucose 204.  Creatinine 1.48.  Calcium 4.3.  White blood count 6.25.  Hemoglobin 14.2.  Platelets 216.    Chest x-ray no  acute chest findings.    EKG Sinus rhythm Atrial premature complex Prolonged DE interval LVH with secondary repolarization abnormality Anterior Q waves, possibly due to LVH    Review of Systems   Constitutional: Negative for chills, diaphoresis and fever.   HENT: Negative.    Eyes: Negative.    Cardiovascular: Positive for chest pain. Negative for dyspnea on exertion and leg swelling.   Respiratory: Negative for cough and shortness of breath.    Skin: Negative.    Musculoskeletal: Negative.    Gastrointestinal: Negative.    Genitourinary: Negative.    Neurological: Negative.    Psychiatric/Behavioral: Negative.           Personal History     No past medical history on file.    No past surgical history on file.    Family History: family history is not on file. Otherwise pertinent FHx was reviewed and not pertinent to current issue.    Social History:      Home Medications:  Prior to Admission Medications     Prescriptions Last Dose Informant Patient Reported? Taking?    aspirin 81 MG chewable tablet   Yes No    Chew 81 mg Daily.    atenolol (TENORMIN) 25 MG tablet   Yes No    Take 25 mg by mouth Daily.    GABAPENTIN PO   Yes No    Take 100 mg by mouth.    glimepiride (AMARYL) 2 MG tablet   Yes No    Take 2 mg by mouth Daily.    meloxicam (MOBIC) 15 MG tablet   Yes No    Take 15 mg by mouth Daily.    metFORMIN (GLUCOPHAGE) 500 MG tablet   Yes No    Take 1,000 mg by mouth.    omeprazole (priLOSEC) 20 MG capsule   Yes No    Take 20 mg by mouth Daily.            Allergies:  No Known Allergies    Objective      Vitals:   Temp:  [98.4 °F (36.9 °C)] 98.4 °F (36.9 °C)  Heart Rate:  [59-94] 59  Resp:  [18] 18  BP: (110-127)/(54-75) 112/61    Physical Exam  Constitutional:       General: He is not in acute distress.     Appearance: He is obese.   HENT:      Head: Normocephalic and atraumatic.   Cardiovascular:      Rate and Rhythm: Normal rate and regular rhythm.      Pulses: Normal pulses.      Heart sounds: Normal heart  sounds.   Pulmonary:      Effort: Pulmonary effort is normal.      Breath sounds: Normal breath sounds.   Abdominal:      General: Bowel sounds are normal.      Palpations: Abdomen is soft.      Tenderness: There is no abdominal tenderness.   Musculoskeletal:         General: Normal range of motion.      Cervical back: Normal range of motion and neck supple.   Skin:     General: Skin is warm and dry.   Neurological:      General: No focal deficit present.      Mental Status: He is alert and oriented to person, place, and time.   Psychiatric:         Mood and Affect: Mood normal.            Result Review    Result Review:  I have personally reviewed the results from the time of this admission to 12/7/2022 15:40 EST and agree with these findings:  [x]  Laboratory  [x]  Microbiology  [x]  Radiology  [x]  EKG/Telemetry   []  Cardiology/Vascular   []  Pathology  []  Old records  []  Other:        Assessment & Plan        Active Hospital Problems:  Active Hospital Problems    Diagnosis    • **NSTEMI (non-ST elevated myocardial infarction) ([facility])      Plan:       NSTEMI  -Chest x-ray no acute findings  -EKG Sinus rhythm Atrial premature complex Prolonged MA interval LVH with secondary repolarization abnormality Anterior Q waves, possibly due to LVH  -Troponins 0.366 and 0.321.  -heparin gtt   -Patient has been initiated on metoprolol by Dr. Yuen at [facility]  -N.p.o. until cardiology evaluation      DM2  -Current glucose  -Hold home diabetic medicines  -SSI  -Check blood glucose before meals and at bedtime      Hyperlipidemia  -Continue statin therapy    DVT prophylaxis:  Medical DVT prophylaxis orders are present.    CODE STATUS:       Admission Status:  I believe this patient meets inpatient status.    I discussed the patient's findings and my recommendations with patient.      Signature: Electronically signed by Iraida Lynn PA-C, 12/07/22, 5:02 PM EST.

## 2022-12-08 ENCOUNTER — APPOINTMENT (OUTPATIENT)
Dept: CARDIOLOGY | Facility: HOSPITAL | Age: 82
End: 2022-12-08

## 2022-12-08 LAB
ACT BLD: 257 SECONDS (ref 89–137)
ANION GAP SERPL CALCULATED.3IONS-SCNC: 12 MMOL/L (ref 5–15)
APTT PPP: 40.1 SECONDS (ref 61–76.5)
APTT PPP: >139 SECONDS (ref 61–76.5)
BASOPHILS # BLD AUTO: 0 10*3/MM3 (ref 0–0.2)
BASOPHILS NFR BLD AUTO: 0.6 % (ref 0–1.5)
BUN SERPL-MCNC: 22 MG/DL (ref 8–23)
BUN/CREAT SERPL: 16.1 (ref 7–25)
CALCIUM SPEC-SCNC: 9 MG/DL (ref 8.6–10.5)
CHLORIDE SERPL-SCNC: 102 MMOL/L (ref 98–107)
CHOLEST SERPL-MCNC: 201 MG/DL (ref 0–200)
CO2 SERPL-SCNC: 24 MMOL/L (ref 22–29)
CREAT SERPL-MCNC: 1.37 MG/DL (ref 0.76–1.27)
DEPRECATED RDW RBC AUTO: 45.9 FL (ref 37–54)
EGFRCR SERPLBLD CKD-EPI 2021: 52.1 ML/MIN/1.73
EOSINOPHIL # BLD AUTO: 0.2 10*3/MM3 (ref 0–0.4)
EOSINOPHIL NFR BLD AUTO: 2.8 % (ref 0.3–6.2)
ERYTHROCYTE [DISTWIDTH] IN BLOOD BY AUTOMATED COUNT: 15 % (ref 12.3–15.4)
GLUCOSE BLDC GLUCOMTR-MCNC: 142 MG/DL (ref 70–105)
GLUCOSE BLDC GLUCOMTR-MCNC: 83 MG/DL (ref 70–105)
GLUCOSE SERPL-MCNC: 129 MG/DL (ref 65–99)
HBA1C MFR BLD: 6.9 % (ref 3.5–5.6)
HCT VFR BLD AUTO: 38.5 % (ref 37.5–51)
HDLC SERPL-MCNC: 36 MG/DL (ref 40–60)
HGB BLD-MCNC: 12.9 G/DL (ref 13–17.7)
LDLC SERPL CALC-MCNC: 132 MG/DL (ref 0–100)
LDLC/HDLC SERPL: 3.57 {RATIO}
LYMPHOCYTES # BLD AUTO: 1.2 10*3/MM3 (ref 0.7–3.1)
LYMPHOCYTES NFR BLD AUTO: 22.4 % (ref 19.6–45.3)
MAGNESIUM SERPL-MCNC: 1.5 MG/DL (ref 1.6–2.4)
MCH RBC QN AUTO: 29.9 PG (ref 26.6–33)
MCHC RBC AUTO-ENTMCNC: 33.6 G/DL (ref 31.5–35.7)
MCV RBC AUTO: 88.9 FL (ref 79–97)
MONOCYTES # BLD AUTO: 0.8 10*3/MM3 (ref 0.1–0.9)
MONOCYTES NFR BLD AUTO: 14.1 % (ref 5–12)
NEUTROPHILS NFR BLD AUTO: 3.3 10*3/MM3 (ref 1.7–7)
NEUTROPHILS NFR BLD AUTO: 60.1 % (ref 42.7–76)
NRBC BLD AUTO-RTO: 0.1 /100 WBC (ref 0–0.2)
PLATELET # BLD AUTO: 186 10*3/MM3 (ref 140–450)
PMV BLD AUTO: 9 FL (ref 6–12)
POTASSIUM SERPL-SCNC: 3.9 MMOL/L (ref 3.5–5.2)
RBC # BLD AUTO: 4.33 10*6/MM3 (ref 4.14–5.8)
SODIUM SERPL-SCNC: 138 MMOL/L (ref 136–145)
TRIGL SERPL-MCNC: 183 MG/DL (ref 0–150)
VLDLC SERPL-MCNC: 33 MG/DL (ref 5–40)
WBC NRBC COR # BLD: 5.5 10*3/MM3 (ref 3.4–10.8)

## 2022-12-08 PROCEDURE — 25010000002 MIDAZOLAM PER 1 MG: Performed by: INTERNAL MEDICINE

## 2022-12-08 PROCEDURE — 0 IOPAMIDOL PER 1 ML: Performed by: INTERNAL MEDICINE

## 2022-12-08 PROCEDURE — 25010000002 FENTANYL CITRATE (PF) 100 MCG/2ML SOLUTION: Performed by: INTERNAL MEDICINE

## 2022-12-08 PROCEDURE — 25010000002 HEPARIN (PORCINE) PER 1000 UNITS: Performed by: INTERNAL MEDICINE

## 2022-12-08 PROCEDURE — C1769 GUIDE WIRE: HCPCS | Performed by: INTERNAL MEDICINE

## 2022-12-08 PROCEDURE — 85730 THROMBOPLASTIN TIME PARTIAL: CPT | Performed by: INTERNAL MEDICINE

## 2022-12-08 PROCEDURE — 85025 COMPLETE CBC W/AUTO DIFF WBC: CPT | Performed by: INTERNAL MEDICINE

## 2022-12-08 PROCEDURE — 93458 L HRT ARTERY/VENTRICLE ANGIO: CPT | Performed by: INTERNAL MEDICINE

## 2022-12-08 PROCEDURE — B240ZZ3 ULTRASONOGRAPHY OF SINGLE CORONARY ARTERY, INTRAVASCULAR: ICD-10-PCS | Performed by: INTERNAL MEDICINE

## 2022-12-08 PROCEDURE — 82962 GLUCOSE BLOOD TEST: CPT

## 2022-12-08 PROCEDURE — 85347 COAGULATION TIME ACTIVATED: CPT

## 2022-12-08 PROCEDURE — 83036 HEMOGLOBIN GLYCOSYLATED A1C: CPT | Performed by: INTERNAL MEDICINE

## 2022-12-08 PROCEDURE — 92928 PRQ TCAT PLMT NTRAC ST 1 LES: CPT | Performed by: INTERNAL MEDICINE

## 2022-12-08 PROCEDURE — 92978 ENDOLUMINL IVUS OCT C 1ST: CPT | Performed by: INTERNAL MEDICINE

## 2022-12-08 PROCEDURE — 80048 BASIC METABOLIC PNL TOTAL CA: CPT | Performed by: INTERNAL MEDICINE

## 2022-12-08 PROCEDURE — 99233 SBSQ HOSP IP/OBS HIGH 50: CPT | Performed by: INTERNAL MEDICINE

## 2022-12-08 PROCEDURE — C1887 CATHETER, GUIDING: HCPCS | Performed by: INTERNAL MEDICINE

## 2022-12-08 PROCEDURE — 83735 ASSAY OF MAGNESIUM: CPT | Performed by: INTERNAL MEDICINE

## 2022-12-08 PROCEDURE — B2151ZZ FLUOROSCOPY OF LEFT HEART USING LOW OSMOLAR CONTRAST: ICD-10-PCS | Performed by: INTERNAL MEDICINE

## 2022-12-08 PROCEDURE — 99153 MOD SED SAME PHYS/QHP EA: CPT | Performed by: INTERNAL MEDICINE

## 2022-12-08 PROCEDURE — 99152 MOD SED SAME PHYS/QHP 5/>YRS: CPT | Performed by: INTERNAL MEDICINE

## 2022-12-08 PROCEDURE — C1894 INTRO/SHEATH, NON-LASER: HCPCS

## 2022-12-08 PROCEDURE — C1874 STENT, COATED/COV W/DEL SYS: HCPCS | Performed by: INTERNAL MEDICINE

## 2022-12-08 PROCEDURE — C1725 CATH, TRANSLUMIN NON-LASER: HCPCS | Performed by: INTERNAL MEDICINE

## 2022-12-08 PROCEDURE — 027034Z DILATION OF CORONARY ARTERY, ONE ARTERY WITH DRUG-ELUTING INTRALUMINAL DEVICE, PERCUTANEOUS APPROACH: ICD-10-PCS | Performed by: INTERNAL MEDICINE

## 2022-12-08 PROCEDURE — C1753 CATH, INTRAVAS ULTRASOUND: HCPCS | Performed by: INTERNAL MEDICINE

## 2022-12-08 PROCEDURE — 80061 LIPID PANEL: CPT | Performed by: INTERNAL MEDICINE

## 2022-12-08 PROCEDURE — B2111ZZ FLUOROSCOPY OF MULTIPLE CORONARY ARTERIES USING LOW OSMOLAR CONTRAST: ICD-10-PCS | Performed by: INTERNAL MEDICINE

## 2022-12-08 PROCEDURE — 4A023N7 MEASUREMENT OF CARDIAC SAMPLING AND PRESSURE, LEFT HEART, PERCUTANEOUS APPROACH: ICD-10-PCS | Performed by: INTERNAL MEDICINE

## 2022-12-08 PROCEDURE — C9600 PERC DRUG-EL COR STENT SING: HCPCS | Performed by: INTERNAL MEDICINE

## 2022-12-08 PROCEDURE — C1894 INTRO/SHEATH, NON-LASER: HCPCS | Performed by: INTERNAL MEDICINE

## 2022-12-08 DEVICE — XIENCE SKYPOINT™ EVEROLIMUS ELUTING CORONARY STENT SYSTEM 3.50 MM X 23 MM / RAPID-EXCHANGE
Type: IMPLANTABLE DEVICE | Site: HEART | Status: FUNCTIONAL
Brand: XIENCE SKYPOINT™

## 2022-12-08 RX ORDER — LIDOCAINE HYDROCHLORIDE 20 MG/ML
INJECTION, SOLUTION INFILTRATION; PERINEURAL
Status: DISCONTINUED | OUTPATIENT
Start: 2022-12-08 | End: 2022-12-08 | Stop reason: HOSPADM

## 2022-12-08 RX ORDER — ACETAMINOPHEN 325 MG/1
650 TABLET ORAL EVERY 4 HOURS PRN
Status: DISCONTINUED | OUTPATIENT
Start: 2022-12-08 | End: 2022-12-08 | Stop reason: SDUPTHER

## 2022-12-08 RX ORDER — CLOPIDOGREL BISULFATE 75 MG/1
75 TABLET ORAL DAILY
Status: DISCONTINUED | OUTPATIENT
Start: 2022-12-09 | End: 2022-12-09 | Stop reason: HOSPADM

## 2022-12-08 RX ORDER — ATORVASTATIN CALCIUM 40 MG/1
40 TABLET, FILM COATED ORAL NIGHTLY
Status: DISCONTINUED | OUTPATIENT
Start: 2022-12-08 | End: 2022-12-08 | Stop reason: SDUPTHER

## 2022-12-08 RX ORDER — DIPHENHYDRAMINE HCL 25 MG
25 CAPSULE ORAL EVERY 6 HOURS PRN
Status: DISCONTINUED | OUTPATIENT
Start: 2022-12-08 | End: 2022-12-09 | Stop reason: HOSPADM

## 2022-12-08 RX ORDER — ASPIRIN 325 MG
TABLET ORAL
Status: DISCONTINUED | OUTPATIENT
Start: 2022-12-08 | End: 2022-12-09 | Stop reason: HOSPADM

## 2022-12-08 RX ORDER — LISINOPRIL 5 MG/1
5 TABLET ORAL
Status: DISCONTINUED | OUTPATIENT
Start: 2022-12-08 | End: 2022-12-09 | Stop reason: HOSPADM

## 2022-12-08 RX ORDER — HEPARIN SODIUM 1000 [USP'U]/ML
INJECTION, SOLUTION INTRAVENOUS; SUBCUTANEOUS
Status: DISCONTINUED | OUTPATIENT
Start: 2022-12-08 | End: 2022-12-08 | Stop reason: HOSPADM

## 2022-12-08 RX ORDER — NITROGLYCERIN 5 MG/ML
INJECTION, SOLUTION INTRAVENOUS
Status: DISCONTINUED | OUTPATIENT
Start: 2022-12-08 | End: 2022-12-08 | Stop reason: HOSPADM

## 2022-12-08 RX ORDER — ONDANSETRON 4 MG/1
4 TABLET, FILM COATED ORAL EVERY 6 HOURS PRN
Status: DISCONTINUED | OUTPATIENT
Start: 2022-12-08 | End: 2022-12-08 | Stop reason: SDUPTHER

## 2022-12-08 RX ORDER — ONDANSETRON 2 MG/ML
4 INJECTION INTRAMUSCULAR; INTRAVENOUS EVERY 6 HOURS PRN
Status: DISCONTINUED | OUTPATIENT
Start: 2022-12-08 | End: 2022-12-08 | Stop reason: SDUPTHER

## 2022-12-08 RX ORDER — FENTANYL CITRATE 50 UG/ML
INJECTION, SOLUTION INTRAMUSCULAR; INTRAVENOUS
Status: DISCONTINUED | OUTPATIENT
Start: 2022-12-08 | End: 2022-12-08 | Stop reason: HOSPADM

## 2022-12-08 RX ORDER — CLOPIDOGREL BISULFATE 75 MG/1
TABLET ORAL
Status: DISCONTINUED | OUTPATIENT
Start: 2022-12-08 | End: 2022-12-09 | Stop reason: HOSPADM

## 2022-12-08 RX ORDER — NICARDIPINE HYDROCHLORIDE 2.5 MG/ML
INJECTION INTRAVENOUS
Status: DISCONTINUED | OUTPATIENT
Start: 2022-12-08 | End: 2022-12-08 | Stop reason: HOSPADM

## 2022-12-08 RX ORDER — MIDAZOLAM HYDROCHLORIDE 1 MG/ML
INJECTION INTRAMUSCULAR; INTRAVENOUS
Status: DISCONTINUED | OUTPATIENT
Start: 2022-12-08 | End: 2022-12-08 | Stop reason: HOSPADM

## 2022-12-08 RX ADMIN — SENNOSIDES AND DOCUSATE SODIUM 2 TABLET: 50; 8.6 TABLET ORAL at 20:28

## 2022-12-08 RX ADMIN — Medication 3 ML: at 22:06

## 2022-12-08 RX ADMIN — METOPROLOL SUCCINATE 25 MG: 25 TABLET, EXTENDED RELEASE ORAL at 08:23

## 2022-12-08 RX ADMIN — Medication 10 ML: at 21:51

## 2022-12-08 RX ADMIN — ASPIRIN 81 MG: 81 TABLET, COATED ORAL at 08:19

## 2022-12-08 RX ADMIN — LISINOPRIL 5 MG: 5 TABLET ORAL at 19:24

## 2022-12-08 RX ADMIN — ATORVASTATIN CALCIUM 40 MG: 40 TABLET, FILM COATED ORAL at 20:28

## 2022-12-08 RX ADMIN — GABAPENTIN 600 MG: 300 CAPSULE ORAL at 20:28

## 2022-12-08 RX ADMIN — GABAPENTIN 600 MG: 300 CAPSULE ORAL at 08:19

## 2022-12-08 RX ADMIN — Medication 10 ML: at 08:20

## 2022-12-08 NOTE — PROGRESS NOTES
[facility] Medicine Services Daily Progress Note    Patient Name: Wilfredo Mcnamara  : 1940  MRN: [id number]  Primary Care Physician:  Nigel Tinoco MD  Date of admission: 2022      Subjective      Chief Complaint:chest pain       History of Present Illness: Wilfredo Mcnamara is a 82 y.o. male with a past medical history to include diabetes type 2 with peripheral neuropathy and hyperlipidemia who presented to [facility] on 2022 complaining of chest pain.  Patient reports that on  he began having chest pain that radiated to both arms that resolved.  He thought perhaps he had indigestion from a poor lunch.  He reports that he had no other symptoms associated with the chest pain and did not go to have evaluation.  He reports that he went to the [facility] on Monday and worked out as per usual and had no chest pains.  He states that today he did go to the [facility] and worked out and about 25 minutes into his bike ride he began having the same chest pain that radiated down both arms, this time half the intensity as the first.  He states this lasted a little while but did resolve.  He decided to go to [facility] for evaluation and it was found there that he had elevated troponins.  He is currently asymptomatic.  He denies having any nausea, vomiting, diaphoresis.  He denies classic lightheadedness, syncopal episodes or dizziness.  He denies having any shortness of air and is not having any chest pains at this time.  He denies abdomen pain, constipation or diarrhea.  He denies having lower extremity edema.  [facility] contacted us to have him admitted for further evaluation and treatment.  He was placed on a heparin drip and transported.     Emergency room work-up vital signs stable.  95% on room air.  Troponin 0.366 and 0.321.  Pro time 10.9.  INR 1.06.  PTT 27.8.  Glucose 204.  Creatinine 1.48.  Calcium 4.3.  White blood count 6.25.  Hemoglobin 14.2.  Platelets  216.     Chest x-ray no acute chest findings.     EKG Sinus rhythm Atrial premature complex Prolonged NE interval LVH with secondary repolarization abnormality Anterior Q waves, possibly due to LVH      12/8/22 doing better today, for cardiac cath today      ROS Constitutional: Negative for chills, diaphoresis and fever.   HENT: Negative.    Eyes: Negative.    Cardiovascular: Positive for chest pain. Negative for dyspnea on exertion and leg swelling.   Respiratory: Negative for cough and shortness of breath.    Skin: Negative.    Musculoskeletal: Negative.    Gastrointestinal: Negative.    Genitourinary: Negative.    Neurological: Negative.    Psychiatric/Behavioral: Negative.           Objective      Vitals:   Temp:  [97.5 °F (36.4 °C)-98.4 °F (36.9 °C)] 98.1 °F (36.7 °C)  Heart Rate:  [58-94] 67  Resp:  [11-18] 14  BP: (110-149)/(54-93) 146/93    Physical Exam Constitutional:       General: He is not in acute distress.     Appearance: He is obese.   HENT:      Head: Normocephalic and atraumatic.   Cardiovascular:      Rate and Rhythm: Normal rate and regular rhythm.      Pulses: Normal pulses.      Heart sounds: Normal heart sounds.   Pulmonary:      Effort: Pulmonary effort is normal.      Breath sounds: Normal breath sounds.   Abdominal:      General: Bowel sounds are normal.      Palpations: Abdomen is soft.      Tenderness: There is no abdominal tenderness.   Musculoskeletal:         General: Normal range of motion.      Cervical back: Normal range of motion and neck supple.   Skin:     General: Skin is warm and dry.   Neurological:      General: No focal deficit present.      Mental Status: He is alert and oriented to person, place, and time.   Psychiatric:         Mood and Affect: Mood normal.           Result Review    Result Review:  I have personally reviewed the results from the time of this admission to 12/8/2022 08:45 EST and agree with these findings:  []  Laboratory  []  Microbiology  []   Radiology  []  EKG/Telemetry   []  Cardiology/Vascular   []  Pathology  []  Old records  []  Other:  Most notable findings include:   CMP:      Lab 12/09/22  0603 12/08/22  0347 12/07/22  1110   SODIUM  --  138 137   POTASSIUM 4.0 3.9 4.3   CHLORIDE  --  102 101   CO2  --  24.0 26.8   ANION GAP  --  12.0 9.2   BUN  --  22 22   CREATININE  --  1.37* 1.48*   EGFR  --  52.1* 47.5*   GLUCOSE  --  129* 204*   CALCIUM  --  9.0 9.7   MAGNESIUM 1.8 1.5*  --    TOTAL PROTEIN  --   --  7.6   ALBUMIN  --   --  4.38   GLOBULIN  --   --  3.2   ALT (SGPT)  --   --  18   AST (SGOT)  --   --  25   BILIRUBIN  --   --  0.6   ALK PHOS  --   --  78     CBC:      Lab 12/09/22  0603 12/08/22 0347 12/07/22  1110   WBC 7.50 5.50 6.25   HEMOGLOBIN 13.3 12.9* 14.2   HEMATOCRIT 39.7 38.5 42.0   PLATELETS 202 186 216   NEUTROS ABS 5.20 3.30 4.18   IMMATURE GRANS (ABS)  --   --  0.00   LYMPHS ABS 1.00 1.20 1.09   MONOS ABS 1.00* 0.80 0.83   EOS ABS 0.20 0.20 0.11   MCV 88.2 88.9 87.1             Assessment & Plan      Brief Patient Summary:  Wilfredo Mcnamara is a 82 y.o. male who       ascorbic acid, 500 mg, Oral, Daily  aspirin, 81 mg, Oral, Daily  atorvastatin, 40 mg, Oral, Nightly  gabapentin, 600 mg, Oral, BID  insulin lispro, 2-7 Units, Subcutaneous, TID With Meals  metoprolol succinate XL, 25 mg, Oral, Q24H  senna-docusate sodium, 2 tablet, Oral, BID  sodium chloride, 10 mL, Intravenous, Q12H  sodium chloride, 3 mL, Intravenous, Q12H       heparin, 9.9 Units/kg/hr, Last Rate: 13.9 Units/kg/hr (12/08/22 0515)         Active Hospital Problems:  Active Hospital Problems    Diagnosis    • **NSTEMI (non-ST elevated myocardial infarction) (HCC)      NSTEMI  -Chest x-ray no acute findings  -EKG Sinus rhythm Atrial premature complex Prolonged OR interval LVH with secondary repolarization abnormality Anterior Q waves, possibly due to LVH  -Troponins 0.366 and 0.321.  -heparin gtt   -Patient has been initiated on metoprolol by Dr. Yuen at [facility]  -N.p.o. until cardiology evaluation        DM2  -Current glucose  -Hold home diabetic medicines  -SSI  -Check blood glucose before meals and at bedtime        Hyperlipidemia  -Continue statin therapy       DVT prophylaxis:  Medical DVT prophylaxis orders are present.    CODE STATUS:    Level Of Support Discussed With: Patient  Code Status (Patient has no pulse and is not breathing): CPR (Attempt to Resuscitate)  Medical Interventions (Patient has pulse or is breathing): Full Support      Disposition:  I expect patient to be discharged home.    This patient has been examined wearing appropriate Personal Protective Equipment and discussed with rn. 12/08/22      Electronically signed by Gary Mandujano MD, 12/08/22, 08:45 EST.  [facility] Hospitalist Team

## 2022-12-08 NOTE — PROGRESS NOTES
Referring Provider: Gary Mandujano MD    Reason for follow-up: Non-ST elevation MI, status post PCI     Patient Care Team:  Nigel Tinoco MD as PCP - General (Internal Medicine)      SUBJECTIVE  Patient is now chest pain-free.  He denies any shortness of breath.  He sitting up on bed and eating food     ROS  Review of all systems negative except as indicated.    Since I have last seen, the patient has been without any chest discomfort, shortness of breath, palpitations, dizziness or syncope.  Denies having any headache, abdominal pain, nausea, vomiting, diarrhea, constipation, loss of weight or loss of appetite.  Denies having any excessive bruising, hematuria or blood in the stool.  ROS      Personal History:    No past medical history on file.    No past surgical history on file.    No family history on file.    Social History     Tobacco Use   • Smoking status: Never     Passive exposure: Never   • Smokeless tobacco: Never   Vaping Use   • Vaping Use: Never used   Substance Use Topics   • Alcohol use: Not Currently        Medications Prior to Admission   Medication Sig Dispense Refill Last Dose   • ascorbic acid (VITAMIN C) 500 MG tablet Take 500 mg by mouth Daily.   12/6/2022   • gabapentin (NEURONTIN) 300 MG capsule Take 600 mg by mouth 2 (Two) Times a Day.   12/6/2022   • glimepiride (AMARYL) 2 MG tablet Take 2 mg by mouth Daily.   12/6/2022   • metFORMIN (GLUCOPHAGE) 500 MG tablet Take 1,000 mg by mouth 2 (Two) Times a Day With Meals.   12/6/2022   • multivitamin with minerals (Mens 50+ Multi Vitamin/Min) tablet tablet Take 1 tablet by mouth Daily.   12/6/2022       Allergies:  Patient has no known allergies.    Scheduled Meds:ascorbic acid, 500 mg, Oral, Daily  aspirin, 81 mg, Oral, Daily  atorvastatin, 40 mg, Oral, Nightly  [START ON 12/9/2022] clopidogrel, 75 mg, Oral, Daily  gabapentin, 600 mg, Oral, BID  insulin lispro, 2-7 Units, Subcutaneous, TID With Meals  lisinopril, 5 mg, Oral,  "Q24H  metoprolol succinate XL, 25 mg, Oral, Q24H  senna-docusate sodium, 2 tablet, Oral, BID  sodium chloride, 10 mL, Intravenous, Q12H  sodium chloride, 3 mL, Intravenous, Q12H      Continuous Infusions:heparin, 9.9 Units/kg/hr, Last Rate: Stopped (12/08/22 1100)      PRN Meds:.•  acetaminophen **OR** acetaminophen **OR** acetaminophen  •  aluminum-magnesium hydroxide-simethicone  •  aspirin  •  senna-docusate sodium **AND** polyethylene glycol **AND** bisacodyl **AND** bisacodyl  •  clopidogrel  •  dextrose  •  dextrose  •  diphenhydrAMINE  •  glucagon (human recombinant)  •  heparin  •  heparin  •  iopamidol  •  magnesium sulfate **OR** magnesium sulfate in D5W 1g/100mL (PREMIX)  •  melatonin  •  nitroglycerin  •  ondansetron **OR** ondansetron  •  potassium chloride  •  potassium chloride  •  sodium chloride  •  sodium chloride  •  sodium chloride  •  sodium chloride  •  sodium chloride      OBJECTIVE    Vital Signs  Vitals:    12/08/22 1415 12/08/22 1430 12/08/22 1445 12/08/22 1500   BP:   128/60 131/58   BP Location:       Patient Position:       Pulse: 66 67 67 68   Resp:       Temp:       TempSrc:       SpO2: 96% 96% 95% 94%   Weight:       Height:           Flowsheet Rows    Flowsheet Row First Filed Value   Admission Height 193 cm (76\") Documented at 12/07/2022 1104   Admission Weight 101 kg (223 lb 6.4 oz) Documented at 12/07/2022 1104          No intake or output data in the 24 hours ending 12/08/22 1630       Telemetry: Sinus rhythm, sinus bradycardia    Physical Exam:  The patient is alert, oriented and in no distress.  Vital signs as noted above.  Head and neck revealed no carotid bruits or jugular venous distention.  No thyromegaly or lymphadenopathy is present  Lungs clear.  No wheezing.  Breath sounds are normal bilaterally.  Heart normal first and second heart sounds.  No murmur. No precordial rub is present.  No gallop is present.  Abdomen soft and nontender.  No organomegaly is " present.  Extremities with good peripheral pulses without any pedal edema.  Skin warm and dry.  Musculoskeletal system is grossly normal.  CNS grossly normal.       Results Review:  I have personally reviewed the results from the time of this admission to 12/8/2022 16:30 EST and agree with these findings:  []  Laboratory  []  Microbiology  []  Radiology  []  EKG/Telemetry   []  Cardiology/Vascular   []  Pathology  []  Old records  []  Other:    Most notable findings include:    Lab Results (last 24 hours)     Procedure Component Value Units Date/Time    POC Activated Clotting Time [[id number]]  (Abnormal) Collected: 12/08/22 1230    Specimen: Arterial Blood Updated: 12/08/22 1557     Activated Clotting Time  257 Seconds      Comment: Serial Number: [id number]:  [id number]       aPTT [[id number]]  (Abnormal) Collected: 12/08/22 1309    Specimen: Blood Updated: 12/08/22 1420     PTT >139.0 seconds      Comment: Result checked         Hemoglobin A1c [[id number]]  (Abnormal) Collected: 12/08/22 0347    Specimen: Blood Updated: 12/08/22 1056     Hemoglobin A1C 6.9 %     Narrative:      Hemoglobin A1C Reference Range:    <5.7 %        Normal  5.7-6.4 %     Increased risk for diabetes  > 6.4 %        Diabetes       These guidelines have been recommended by the American Diabetic Association for Hgb A1c.      The following 2010 guidelines have been recommended by the American Diabetes Association for Hemoglobin A1c.    HBA1c 5.7-6.4% Increased risk for future diabetes (pre-diabetes)  HBA1c     >6.4% Diabetes      POC Glucose Once [[id number]]  (Abnormal) Collected: 12/08/22 0713    Specimen: Blood Updated: 12/08/22 0714     Glucose 142 mg/dL      Comment: Serial Number: [id number]:  [id number]       aPTT [[id number]]  (Abnormal) Collected: 12/08/22 0347    Specimen: Blood Updated: 12/08/22 0458     PTT 40.1 seconds     Basic Metabolic Panel [[id number]]  (Abnormal) Collected: 12/08/22 0347    Specimen: Blood Updated:  12/08/22 0447     Glucose 129 mg/dL      BUN 22 mg/dL      Creatinine 1.37 mg/dL      Sodium 138 mmol/L      Potassium 3.9 mmol/L      Chloride 102 mmol/L      CO2 24.0 mmol/L      Calcium 9.0 mg/dL      BUN/Creatinine Ratio 16.1     Anion Gap 12.0 mmol/L      eGFR 52.1 mL/min/1.73      Comment: National Kidney Foundation and American Society of Nephrology (ASN) Task Force recommended calculation based on the Chronic Kidney Disease Epidemiology Collaboration (CKD-EPI) equation refit without adjustment for race.       Narrative:      GFR Normal >60  Chronic Kidney Disease <60  Kidney Failure <15    The GFR formula is only valid for adults with stable renal function between ages 18 and 70.    Magnesium [[id number]]  (Abnormal) Collected: 12/08/22 0347    Specimen: Blood Updated: 12/08/22 0447     Magnesium 1.5 mg/dL     Lipid Panel [[id number]]  (Abnormal) Collected: 12/08/22 0347    Specimen: Blood Updated: 12/08/22 0447     Total Cholesterol 201 mg/dL      Triglycerides 183 mg/dL      HDL Cholesterol 36 mg/dL      LDL Cholesterol  132 mg/dL      VLDL Cholesterol 33 mg/dL      LDL/HDL Ratio 3.57    Narrative:      Cholesterol Reference Ranges  (U.S. Department of Health and Human Services ATP III Classifications)    Desirable          <200 mg/dL  Borderline High    200-239 mg/dL  High Risk          >240 mg/dL      Triglyceride Reference Ranges  (U.S. Department of Health and Human Services ATP III Classifications)    Normal           <150 mg/dL  Borderline High  150-199 mg/dL  High             200-499 mg/dL  Very High        >500 mg/dL    HDL Reference Ranges  (U.S. Department of Health and Human Services ATP III Classifications)    Low     <40 mg/dl (major risk factor for CHD)  High    >60 mg/dl ('negative' risk factor for CHD)        LDL Reference Ranges  (U.S. Department of Health and Human Services ATP III Classifications)    Optimal          <100 mg/dL  Near Optimal     100-129 mg/dL  Borderline High  130-159  mg/dL  High             160-189 mg/dL  Very High        >189 mg/dL    CBC Auto Differential [[id number]]  (Abnormal) Collected: 12/08/22 0347    Specimen: Blood Updated: 12/08/22 0423     WBC 5.50 10*3/mm3      RBC 4.33 10*6/mm3      Hemoglobin 12.9 g/dL      Hematocrit 38.5 %      MCV 88.9 fL      MCH 29.9 pg      MCHC 33.6 g/dL      RDW 15.0 %      RDW-SD 45.9 fl      MPV 9.0 fL      Platelets 186 10*3/mm3      Neutrophil % 60.1 %      Lymphocyte % 22.4 %      Monocyte % 14.1 %      Eosinophil % 2.8 %      Basophil % 0.6 %      Neutrophils, Absolute 3.30 10*3/mm3      Lymphocytes, Absolute 1.20 10*3/mm3      Monocytes, Absolute 0.80 10*3/mm3      Eosinophils, Absolute 0.20 10*3/mm3      Basophils, Absolute 0.00 10*3/mm3      nRBC 0.1 /100 WBC     aPTT [[id number]]  (Abnormal) Collected: 12/07/22 2051    Specimen: Blood Updated: 12/07/22 2126     PTT 33.0 seconds     TSH [[id number]]  (Normal) Collected: 12/07/22 1844    Specimen: Blood Updated: 12/07/22 1949     TSH 1.200 uIU/mL           Imaging Results (Last 24 Hours)     ** No results found for the last 24 hours. **          LAB RESULTS (LAST 7 DAYS)    CBC  Results from last 7 days   Lab Units 12/08/22  0347 12/07/22  1110   WBC 10*3/mm3 5.50 6.25   RBC 10*6/mm3 4.33 4.82   HEMOGLOBIN g/dL 12.9* 14.2   HEMATOCRIT % 38.5 42.0   MCV fL 88.9 87.1   PLATELETS 10*3/mm3 186 216       BMP  Results from last 7 days   Lab Units 12/08/22  0347 12/07/22  1110   SODIUM mmol/L 138 137   POTASSIUM mmol/L 3.9 4.3   CHLORIDE mmol/L 102 101   CO2 mmol/L 24.0 26.8   BUN mg/dL 22 22   CREATININE mg/dL 1.37* 1.48*   GLUCOSE mg/dL 129* 204*   MAGNESIUM mg/dL 1.5*  --        CMP   Results from last 7 days   Lab Units 12/08/22  0347 12/07/22  1110   SODIUM mmol/L 138 137   POTASSIUM mmol/L 3.9 4.3   CHLORIDE mmol/L 102 101   CO2 mmol/L 24.0 26.8   BUN mg/dL 22 22   CREATININE mg/dL 1.37* 1.48*   GLUCOSE mg/dL 129* 204*   ALBUMIN g/dL  --  4.38   BILIRUBIN mg/dL  --  0.6   ALK PHOS U/L   --  78   AST (SGOT) U/L  --  25   ALT (SGPT) U/L  --  18       BNP        TROPONIN  Results from last 7 days   Lab Units 12/07/22  1301   TROPONIN T ng/mL 0.321*       CoAg  Results from last 7 days   Lab Units 12/08/22  1309 12/08/22  0347 12/07/22 2051 12/07/22  1239   INR   --   --   --  1.06   APTT seconds >139.0* 40.1* 33.0* 27.8*       Creatinine Clearance  Estimated Creatinine Clearance: 62 mL/min (A) (by C-G formula based on SCr of 1.37 mg/dL (H)).    ABG        Radiology  XR Chest 1 View    Result Date: 12/7/2022  No acute chest findings.  Electronically Signed By-Lexie Keen MD On:12/7/2022 11:22 AM This report was finalized on [id number] by  Lexie Keen MD.        EKG  I personally viewed and interpreted the patient's EKG/Telemetry data:  ECG 12 Lead   ED Interpretation   Abnormal   Micky Yuen MD     12/7/2022  1:08 PM   ECG 12 Lead         Date/Time: 12/7/2022 11:38 AM   Performed by: Micky Yuen MD   Authorized by: Micky Yuen MD    Comparison: compared with previous ECG    Rhythm: sinus rhythm   Rate: normal   Clinical impression: abnormal ECG and non-specific ECG         ECG 12 Lead ED Triage Standing Order; Chest Pain   Final Result   HEART RATE= 86  bpm   RR Interval= 700  ms   TX Interval= 243  ms   P Horizontal Axis= -29  deg   P Front Axis= 49  deg   QRSD Interval= 98  ms   QT Interval= 398  ms   QRS Axis= -36  deg   T Wave Axis= 114  deg   - ABNORMAL ECG -   Sinus rhythm   Atrial premature complex   Prolonged TX interval   LVH with secondary repolarization abnormality   Anterior Q waves, possibly due to LVH   When compared with ECG of 24-May-2022 10:51:29,   New or worsened ischemia or infarction   Significant repolarization change   Significant axis, voltage or hypertrophy change   Electronically Signed By: Micky Yuen (GER) 07-Dec-2022 13:24:04   Date and Time of Study: 2022-12-07 11:12:04      SCANNED - TELEMETRY     Final Result      SCANNED - TELEMETRY     Final Result       SCANNED - TELEMETRY     Final Result      SCANNED - TELEMETRY     Final Result      SCANNED - TELEMETRY     Final Result      SCANNED - TELEMETRY     Final Result      SCANNED EKG   Final Result            Echocardiogram:          Stress Test:         Cardiac Catheterization:  Results for orders placed during the hospital encounter of 12/07/22    Cardiac Catheterization/Vascular Study (Needs Review)  This result has not been signed. Information might be incomplete.    Narrative  OPERATORS:  1. Haris Estrada M.D., Attending Cardiologist      PROCEDURE PERFORMED.  Ultrasound guided vascular access  Left heart catheterization  Coronary Angiogram 21155  Percutaneous coronary intervention with drug-eluting stent placement to LAD 92117  Intravascular ultrasound LAD 84442  Moderate Sedation 50 minutes    INDICATIONS FOR PROCEDURE.  82-year-old man presented with acute non-ST elevation myocardial infarction.  After discussing the risk and benefit of cardiac catheterization he was brought into the Cath Lab for urgent procedure    PROCEDURE IN DETAIL.  Informed consent was obtained from the patient after explaining the risks, benefits, and alternative options of the procedure. After obtaining informed consent, the patient was brought to the cath lab and was prepped in a sterile fashion. Lidocaine 1% was used for local anesthesia into the right radial access site. The right radial artery was accessed under direct ultrasound visualization  with an angiocath needle via modified Seldinger technique. A 6F slender sheath was inserted successfully. Afterwards, 5F JR4  was advanced over a wire into the ascending aorta and used to cross the AV and obtain LV pressures.  AV gradient obtained via pullback technique. JR4 and JL3.5 diagnostic catheters were used to engage the ostia of the RCA and LM respectively. Images of the right and left coronary systems were obtained.    HEMODYNAMICS.  LV: 132/10, 17 mmHg  AO: 133/74, 100 mmHg    No  significant gradient across aortic valve during pullback of JR4 catheter.    MEDICATIONS.  1. Versed  2. Fentanyl  3. Verapamil  4. Nitro  5. Heparin    FINDINGS.    Coronary Angiogram.    1. Left main. Left main is a large-caliber vessel which gives rise to the Left Anterior Descending and the Left circumflex.  Left main has no significant disease    2. Left Anterior Descending Artery. LAD is a large vessel which gives rise to several septal perforators and several diagonal branches.  In the midsegment it bifurcates into a diagonal and dual LAD system.  This has diffuse disease 50% throughout    3. Left Circumflex. The LCx is a meidum caliber which gives rise to marginals.  There is a 99% hazy looking lesion in the mid left circumflex with MARK II flow.    4. Right Coronary Artery. The RCA is a dominant vessel which gives rise to several small caliber branches.  PLV has 70% lesion in a small vessel 2 mm.  Overall RCA has luminal irregularities    Percutaneous coronary intervention  10,000 units of heparin were administered and ACT of more than 250 was documented.  Left main ostium was engaged with 6 Ivorian XB 3.5 guide catheter.  0.014 run-through wire was then advanced into the distal left circumflex artery.  Lesion in the mid circumflex was then predilated with 2.5 x 12 noncompliant balloon.  Over the run-through wire I then advanced intravascular ultrasound catheter and obtained measurements.  Distal left circumflex measured 4 mm in dimension, proximal left circumflex measured 4.5 mm in dimension.  IVUS also confirmed 80% thrombotic lesion in the mid left circumflex.  I then placed 3.5 x 23 mm Xience ruperto point stent at 14 ashley.  This was postdilated with 4.5 x 15 mm noncompliant balloon at 16 ashley.  Final angiography showed 0% stenosis and MARK-3 flow.  Final IVUS showed fully expanded stents with excellent apposition.    All the catheters were exchanged over a wire and subsequently removed. The patient tolerated  the procedure well without any complications. The pictures were reviewed at the end of the procedure. TR band applied to right wrist for hemostasis and inflated with 15 cc of air. No complications were encountered.    IMPRESSIONS.  1.  One-vessel obstructive coronary artery disease  2.  Status post PCI of left circumflex, the culprit vessel with IVUS guidance    RECOMMENDATIONS.  1.  Start dual antiplatelet therapy  2. Exercise and lifestyle modifications recommended.  3.  Risk factors modification  4.  Cardiac rehab referral         Other:         ASSESSMENT & PLAN:    Principal Problem:    NSTEMI (non-ST elevated myocardial infarction) (HCC)  Hypertension  Hyperlipidemia  Coronary artery disease  Diabetes    82-year-old man with hypertension, hyperlipidemia, diabetes presents with chest pain.  His ECG showed subtle ST depressions in the lateral leads with nonspecific T wave changes.  He was noted to have elevated troponin   He was started on heparin drip per ACS protocol.  We immediately took him to the Cath Lab where he was noted to have 99% lesion in his left circumflex artery.  He underwent PCI with drug-eluting stent placement under IVUS guidance.  3.5 x 23 mm Xience ruperto point stent was placed and postdilated with 4.5 mm balloon.  Will now started him on ACE inhibitor, beta-blocker and high intensity statin.  His LDL is 132, total cholesterol 201, HDL 36, triglyceride 183..  His HbA1c 6.9  He has been started on aspirin and Plavix which she will continue for 1 year.  He will benefit from cardiac rehab although patient exercises every day and at the [facility].  Once this TR band is removed he may be discharged from cardiac standpoint with plans to follow-up with me in 2 weeks.  He needs an echocardiogram which I can obtain as an outpatient      Haris Estrada MD  12/08/22  16:30 EST

## 2022-12-08 NOTE — CONSULTS
Referring Provider: Gary Mandujano MD    Reason for Consultation: Chest pain      Patient Care Team:  Nigel Tinoco MD as PCP - General (Internal Medicine)      SUBJECTIVE     Chief Complaint: Chest pain    History of present illness:  Wilfredo Mcnamara is a 82 y.o. male with hypertension, hyperlipidemia, diabetes with peripheral neuropathy who presented to the hospital with complaints of chest pain.  He began having chest pain 2 days ago.  The pain radiated to both his arms.  He initially attributed this to indigestion from a poor lunch however the pain persisted.  He also went to [facility] on Monday and worked out as usual with no chest pain.  However today while working out for about 25 minutes he began having chest pain radiating down his arms.  He decided to go to [facility] for evaluation and was found to have elevated troponin.  He denies fever, chills, nausea, vomiting.  He denies lightheadedness or syncope.  He denies having previous such episodes and denies any shortness of breath.  He denies any lower extremity edema or heart failure-like symptoms.  He has known PACs for which he sees Dr. Blackwood.    REVIEW OF SYSTEMS:    Constitutional: No weakness,fatigue, fever, rigors, chills   Eyes: No vision changes, eye pain   ENT/oropharynx: No difficulty swallowing, sore throat, epistaxis, changes in hearing   Cardiovascular: + chest pain, chest tightness, palpitations, paroxysmal nocturnal dyspnea, orthopnea, diaphoresis, dizziness / syncopal episode   Respiratory: No shortness of breath, dyspnea on exertion, cough, wheezing hemoptysis   Gastrointestinal: No abdominal pain, nausea, vomiting, diarrhea, bloody stools   Genitourinary: No hematuria, dysuria   Neurological: No headache, tremors, numbness,  one-sided weakness    Musculoskeletal: No cramps, myalgias,  joint pain, joint swelling   Integument: No rash, edema              Personal History:      No past medical history on file.  Past medical history  of diabetes, PACs, sleep apnea, GERD  No past surgical history on file.    No family history on file.   No significant family history of coronary disease, sudden cardiac death or malignant arrhythmia    Social History     Tobacco Use   • Smoking status: Never     Passive exposure: Never   • Smokeless tobacco: Never   Vaping Use   • Vaping Use: Never used   Substance Use Topics   • Alcohol use: Not Currently        Medications Prior to Admission   Medication Sig Dispense Refill Last Dose   • ascorbic acid (VITAMIN C) 500 MG tablet Take 500 mg by mouth Daily.   12/6/2022   • gabapentin (NEURONTIN) 300 MG capsule Take 600 mg by mouth 2 (Two) Times a Day.   12/6/2022   • glimepiride (AMARYL) 2 MG tablet Take 2 mg by mouth Daily.   12/6/2022   • metFORMIN (GLUCOPHAGE) 500 MG tablet Take 1,000 mg by mouth 2 (Two) Times a Day With Meals.   12/6/2022   • multivitamin with minerals (Mens 50+ Multi Vitamin/Min) tablet tablet Take 1 tablet by mouth Daily.   12/6/2022        Allergies:     Patient has no known allergies.    Scheduled Meds:ascorbic acid, 500 mg, Oral, Daily  [START ON 12/8/2022] aspirin, 81 mg, Oral, Daily  atorvastatin, 40 mg, Oral, Nightly  gabapentin, 600 mg, Oral, BID  insulin lispro, 2-7 Units, Subcutaneous, TID With Meals  [START ON 12/8/2022] metoprolol succinate XL, 25 mg, Oral, Q24H  senna-docusate sodium, 2 tablet, Oral, BID  sodium chloride, 10 mL, Intravenous, Q12H      Continuous Infusions:heparin, 9.9 Units/kg/hr, Last Rate: 9.9 Units/kg/hr (12/07/22 1614)      PRN Meds:•  acetaminophen **OR** acetaminophen **OR** acetaminophen  •  aluminum-magnesium hydroxide-simethicone  •  senna-docusate sodium **AND** polyethylene glycol **AND** bisacodyl **AND** bisacodyl  •  dextrose  •  dextrose  •  glucagon (human recombinant)  •  heparin  •  heparin  •  magnesium sulfate **OR** magnesium sulfate in D5W 1g/100mL (PREMIX)  •  melatonin  •  nitroglycerin  •  ondansetron **OR** ondansetron  •  potassium  "chloride  •  potassium chloride  •  sodium chloride  •  sodium chloride  •  sodium chloride      OBJECTIVE    Vital Signs  Vitals:    12/07/22 1401 12/07/22 1501 12/07/22 1604 12/07/22 1733   BP: 110/54 112/61 139/82 149/86   BP Location:   Right arm Right arm   Patient Position:   Lying Lying   Pulse: 60 59 58 59   Resp:   11 12   Temp:   97.9 °F (36.6 °C) 97.5 °F (36.4 °C)   TempSrc:   Oral Oral   SpO2: 95% 95% 96% 95%   Weight:       Height:           Flowsheet Rows    Flowsheet Row First Filed Value   Admission Height 193 cm (76\") Documented at 12/07/2022 1104   Admission Weight 101 kg (223 lb 6.4 oz) Documented at 12/07/2022 1104          No intake or output data in the 24 hours ending 12/07/22 1931     Telemetry: Sinus rhythm, PACs, first-degree AV block, LVH    Physical Exam:  The patient is alert, oriented and in no distress.  Vital signs as noted above.  Head and neck revealed no carotid bruits or jugular venous distention.  No thyromegaly or lymph adenopathy is present  Lungs clear.  No wheezing.  Breath sounds are normal bilaterally.  Heart normal first and second heart sounds. No murmur.  No precordial rub is present.  No gallop is present.  Abdomen soft and nontender.  No organomegaly is present.  Extremities with good peripheral pulses without any pedal edema.  Skin warm and dry.  Musculoskeletal system is grossly normal.  CNS grossly normal.       Results Review:  I have personally reviewed the results from the time of this admission to 12/7/2022 19:31 EST and agree with these findings:  []  Laboratory  []  Microbiology  []  Radiology  []  EKG/Telemetry   []  Cardiology/Vascular   []  Pathology  []  Old records  []  Other:    Most notable findings include:     Lab Results (last 24 hours)     Procedure Component Value Units Date/Time    TSH [[id number]] Collected: 12/07/22 1844    Specimen: Blood Updated: 12/07/22 1917    POC Glucose Once [[id number]]  (Normal) Collected: 12/07/22 1613    Specimen: Blood " Updated: 12/07/22 1614     Glucose 91 mg/dL      Comment: Serial Number: [id number]:  [id number]       Protime-INR [[id number]]  (Normal) Collected: 12/07/22 1239    Specimen: Blood Updated: 12/07/22 1354     Protime 10.9 Seconds      INR 1.06    aPTT [[id number]]  (Abnormal) Collected: 12/07/22 1239    Specimen: Blood Updated: 12/07/22 1354     PTT 27.8 seconds     Troponin [[id number]]  (Abnormal) Collected: 12/07/22 1301    Specimen: Blood Updated: 12/07/22 1326     Troponin T 0.321 ng/mL     Narrative:      Troponin T Reference Range:  <= 0.03 ng/mL-   Negative for AMI  >0.03 ng/mL-     Abnormal for myocardial necrosis.  Clinicians would have to utilize clinical acumen, EKG, Troponin and serial changes to determine if it is an Acute Myocardial Infarction or myocardial injury due to an underlying chronic condition.       Results may be falsely decreased if patient taking Biotin.      Comprehensive Metabolic Panel [[id number]]  (Abnormal) Collected: 12/07/22 1110    Specimen: Blood Updated: 12/07/22 1134     Glucose 204 mg/dL      BUN 22 mg/dL      Creatinine 1.48 mg/dL      Sodium 137 mmol/L      Potassium 4.3 mmol/L      Chloride 101 mmol/L      CO2 26.8 mmol/L      Calcium 9.7 mg/dL      Total Protein 7.6 g/dL      Albumin 4.38 g/dL      ALT (SGPT) 18 U/L      AST (SGOT) 25 U/L      Alkaline Phosphatase 78 U/L      Total Bilirubin 0.6 mg/dL      Globulin 3.2 gm/dL      A/G Ratio 1.4 g/dL      BUN/Creatinine Ratio 14.9     Anion Gap 9.2 mmol/L      eGFR 47.5 mL/min/1.73      Comment: National Kidney Foundation and American Society of Nephrology (ASN) Task Force recommended calculation based on the Chronic Kidney Disease Epidemiology Collaboration (CKD-EPI) equation refit without adjustment for race.       Narrative:      GFR Normal >60  Chronic Kidney Disease <60  Kidney Failure <15    The GFR formula is only valid for adults with stable renal function between ages 18 and 70.    Troponin [[id number]]   (Abnormal) Collected: 12/07/22 1110    Specimen: Blood Updated: 12/07/22 1134     Troponin T 0.366 ng/mL     Narrative:      Troponin T Reference Range:  <= 0.03 ng/mL-   Negative for AMI  >0.03 ng/mL-     Abnormal for myocardial necrosis.  Clinicians would have to utilize clinical acumen, EKG, Troponin and serial changes to determine if it is an Acute Myocardial Infarction or myocardial injury due to an underlying chronic condition.       Results may be falsely decreased if patient taking Biotin.      CBC & Differential [[id number]]  (Abnormal) Collected: 12/07/22 1110    Specimen: Blood Updated: 12/07/22 1114    Narrative:      The following orders were created for panel order CBC & Differential.  Procedure                               Abnormality         Status                     ---------                               -----------         ------                     CBC Auto Differential[[id number]]        Abnormal            Final result                 Please view results for these tests on the individual orders.    CBC Auto Differential [[id number]]  (Abnormal) Collected: 12/07/22 1110    Specimen: Blood Updated: 12/07/22 1114     WBC 6.25 10*3/mm3      RBC 4.82 10*6/mm3      Hemoglobin 14.2 g/dL      Hematocrit 42.0 %      MCV 87.1 fL      MCH 29.5 pg      MCHC 33.8 g/dL      RDW 14.4 %      RDW-SD 46.1 fl      MPV 10.3 fL      Platelets 216 10*3/mm3      Neutrophil % 66.9 %      Lymphocyte % 17.4 %      Monocyte % 13.3 %      Eosinophil % 1.8 %      Basophil % 0.6 %      Immature Grans % 0.0 %      Neutrophils, Absolute 4.18 10*3/mm3      Lymphocytes, Absolute 1.09 10*3/mm3      Monocytes, Absolute 0.83 10*3/mm3      Eosinophils, Absolute 0.11 10*3/mm3      Basophils, Absolute 0.04 10*3/mm3      Immature Grans, Absolute 0.00 10*3/mm3           Imaging Results (Last 24 Hours)     Procedure Component Value Units Date/Time    XR Chest 1 View [[id number]] Collected: 12/07/22 1122     Updated: 12/07/22 1124     Narrative:      DATE OF EXAM:  12/7/2022 11:10 AM     PROCEDURE:  XR CHEST 1 VW-     INDICATIONS:  Chest Pain Triage Protocol       COMPARISON:  None     TECHNIQUE:   Single radiographic view of the chest was obtained.     FINDINGS:  No acute airspace disease. Heart size is normal. No pleural effusion or  pneumothorax or acute osseous abnormalities are identified. Pulmonary  vascular distribution is within normal limits.       Impression:      No acute chest findings.     Electronically Signed By-Lexie Keen MD On:12/7/2022 11:22 AM  This report was finalized on [id number] by  Lexie Keen MD.          LAB RESULTS (LAST 7 DAYS)    CBC  Results from last 7 days   Lab Units 12/07/22  1110   WBC 10*3/mm3 6.25   RBC 10*6/mm3 4.82   HEMOGLOBIN g/dL 14.2   HEMATOCRIT % 42.0   MCV fL 87.1   PLATELETS 10*3/mm3 216       BMP  Results from last 7 days   Lab Units 12/07/22  1110   SODIUM mmol/L 137   POTASSIUM mmol/L 4.3   CHLORIDE mmol/L 101   CO2 mmol/L 26.8   BUN mg/dL 22   CREATININE mg/dL 1.48*   GLUCOSE mg/dL 204*       CMP   Results from last 7 days   Lab Units 12/07/22  1110   SODIUM mmol/L 137   POTASSIUM mmol/L 4.3   CHLORIDE mmol/L 101   CO2 mmol/L 26.8   BUN mg/dL 22   CREATININE mg/dL 1.48*   GLUCOSE mg/dL 204*   ALBUMIN g/dL 4.38   BILIRUBIN mg/dL 0.6   ALK PHOS U/L 78   AST (SGOT) U/L 25   ALT (SGPT) U/L 18       BNP        TROPONIN  Results from last 7 days   Lab Units 12/07/22  1301   TROPONIN T ng/mL 0.321*       CoAg  Results from last 7 days   Lab Units 12/07/22  1239   INR  1.06   APTT seconds 27.8*       Creatinine Clearance  Estimated Creatinine Clearance: 57.4 mL/min (A) (by C-G formula based on SCr of 1.48 mg/dL (H)).    ABG          Radiology  XR Chest 1 View    Result Date: 12/7/2022  No acute chest findings.  Electronically Signed By-Lexie Keen MD On:12/7/2022 11:22 AM This report was finalized on [id number] by  Lexie Keen MD.        EKG  I personally viewed and interpreted the  patient's EKG/Telemetry data:  ECG 12 Lead   ED Interpretation   Abnormal   Micky Yuen MD     12/7/2022  1:08 PM   ECG 12 Lead         Date/Time: 12/7/2022 11:38 AM   Performed by: Micky Yuen MD   Authorized by: Micky Yuen MD    Comparison: compared with previous ECG    Rhythm: sinus rhythm   Rate: normal   Clinical impression: abnormal ECG and non-specific ECG         ECG 12 Lead ED Triage Standing Order; Chest Pain   Final Result   HEART RATE= 86  bpm   RR Interval= 700  ms   WI Interval= 243  ms   P Horizontal Axis= -29  deg   P Front Axis= 49  deg   QRSD Interval= 98  ms   QT Interval= 398  ms   QRS Axis= -36  deg   T Wave Axis= 114  deg   - ABNORMAL ECG -   Sinus rhythm   Atrial premature complex   Prolonged WI interval   LVH with secondary repolarization abnormality   Anterior Q waves, possibly due to LVH   When compared with ECG of 24-May-2022 10:51:29,   New or worsened ischemia or infarction   Significant repolarization change   Significant axis, voltage or hypertrophy change   Electronically Signed By: Micky Yuen (GER) 07-Dec-2022 13:24:04   Date and Time of Study: 2022-12-07 11:12:04            Echocardiogram:          Stress Test:        Cardiac Catheterization:  No results found for this or any previous visit.        Other:      ASSESSMENT & PLAN:    Principal Problem:    NSTEMI (non-ST elevated myocardial infarction) (HCC)    82-year-old man with hypertension, hyperlipidemia, diabetes presents with chest pain.  His ECG shows subtle ST depressions in the lateral leads with nonspecific T wave changes.  He also has elevated troponin which is now trending down 0.366 and 0.321.  Obtain lipid panel and HbA1c.  Obtain an echocardiogram  He has been started on heparin drip per ACS protocol.  Start high intensity statin.  Start beta-blocker.  Unable to start ACE inhibitor due to acute kidney injury and borderline low blood pressure.  Chest pain is resolved however we will offer nitroglycerin if chest  pain persists.  Hold metformin due to acute kidney injury.  Continue omeprazole.  Continue aspirin.  MARK score is 5  Risk and benefit of cardiac catheterization were discussed with the patient in details and we have decided to proceed.      Haris Estrada MD  12/07/22  19:31 EST

## 2022-12-08 NOTE — PLAN OF CARE
Patient's vitals stable.  No complaints of chest pain per patient.  Patient to be made NPO at midnight for potential heart cath with Dr. Estrada tomorrow.  All needs met.    Problem: Adult Inpatient Plan of Care  Goal: Absence of Hospital-Acquired Illness or Injury  Intervention: Identify and Manage Fall Risk  Recent Flowsheet Documentation  Taken 12/7/2022 2320 by Ana M Valdivia RN  Safety Promotion/Fall Prevention:   room organization consistent   safety round/check completed   nonskid shoes/slippers when out of bed  Taken 12/7/2022 2200 by Ana M Valdivia RN  Safety Promotion/Fall Prevention:   room organization consistent   safety round/check completed   nonskid shoes/slippers when out of bed  Taken 12/7/2022 2000 by Ana M Valdivia RN  Safety Promotion/Fall Prevention:   safety round/check completed   room organization consistent   nonskid shoes/slippers when out of bed  Intervention: Prevent and Manage VTE (Venous Thromboembolism) Risk  Recent Flowsheet Documentation  Taken 12/7/2022 2320 by Ana M Valdivia RN  Activity Management: activity adjusted per tolerance  Taken 12/7/2022 2000 by Ana M Valdivia RN  Activity Management:   activity adjusted per tolerance   up ad pennie  Goal: Optimal Comfort and Wellbeing  Intervention: Provide Person-Centered Care  Recent Flowsheet Documentation  Taken 12/7/2022 2320 by Ana M Valdivia RN  Trust Relationship/Rapport:   care explained   questions answered   questions encouraged   thoughts/feelings acknowledged  Taken 12/7/2022 2000 by Ana M Valdivia RN  Trust Relationship/Rapport:   care explained   questions answered   questions encouraged   thoughts/feelings acknowledged   Goal Outcome Evaluation:

## 2022-12-09 ENCOUNTER — APPOINTMENT (OUTPATIENT)
Dept: CARDIOLOGY | Facility: HOSPITAL | Age: 82
End: 2022-12-09

## 2022-12-09 VITALS
TEMPERATURE: 98.3 F | WEIGHT: 224 LBS | HEART RATE: 65 BPM | SYSTOLIC BLOOD PRESSURE: 136 MMHG | HEIGHT: 76 IN | BODY MASS INDEX: 27.28 KG/M2 | RESPIRATION RATE: 18 BRPM | OXYGEN SATURATION: 94 % | DIASTOLIC BLOOD PRESSURE: 56 MMHG

## 2022-12-09 PROBLEM — G47.33 OBSTRUCTIVE SLEEP APNEA SYNDROME: Status: ACTIVE | Noted: 2022-05-02

## 2022-12-09 PROBLEM — E66.3 OVERWEIGHT (BMI 25.0-29.9): Status: ACTIVE | Noted: 2022-12-09

## 2022-12-09 PROBLEM — G47.33 OBSTRUCTIVE SLEEP APNEA SYNDROME: Chronic | Status: ACTIVE | Noted: 2022-05-02

## 2022-12-09 PROBLEM — I25.110 CORONARY ARTERY DISEASE INVOLVING NATIVE CORONARY ARTERY OF NATIVE HEART WITH UNSTABLE ANGINA PECTORIS: Status: ACTIVE | Noted: 2022-12-07

## 2022-12-09 LAB
ANION GAP SERPL CALCULATED.3IONS-SCNC: 15 MMOL/L (ref 5–15)
APTT PPP: 30.8 SECONDS (ref 61–76.5)
BASOPHILS # BLD AUTO: 0.1 10*3/MM3 (ref 0–0.2)
BASOPHILS NFR BLD AUTO: 0.8 % (ref 0–1.5)
BUN SERPL-MCNC: 22 MG/DL (ref 8–23)
BUN/CREAT SERPL: 15.2 (ref 7–25)
CALCIUM SPEC-SCNC: 9.1 MG/DL (ref 8.6–10.5)
CHLORIDE SERPL-SCNC: 98 MMOL/L (ref 98–107)
CO2 SERPL-SCNC: 23 MMOL/L (ref 22–29)
CREAT SERPL-MCNC: 1.45 MG/DL (ref 0.76–1.27)
DEPRECATED RDW RBC AUTO: 49.4 FL (ref 37–54)
EGFRCR SERPLBLD CKD-EPI 2021: 48.7 ML/MIN/1.73
EOSINOPHIL # BLD AUTO: 0.2 10*3/MM3 (ref 0–0.4)
EOSINOPHIL NFR BLD AUTO: 2.2 % (ref 0.3–6.2)
ERYTHROCYTE [DISTWIDTH] IN BLOOD BY AUTOMATED COUNT: 15.2 % (ref 12.3–15.4)
GLUCOSE BLDC GLUCOMTR-MCNC: 149 MG/DL (ref 70–105)
GLUCOSE SERPL-MCNC: 144 MG/DL (ref 65–99)
HCT VFR BLD AUTO: 39.7 % (ref 37.5–51)
HGB BLD-MCNC: 13.3 G/DL (ref 13–17.7)
LYMPHOCYTES # BLD AUTO: 1 10*3/MM3 (ref 0.7–3.1)
LYMPHOCYTES NFR BLD AUTO: 13.9 % (ref 19.6–45.3)
MAGNESIUM SERPL-MCNC: 1.8 MG/DL (ref 1.6–2.4)
MCH RBC QN AUTO: 29.5 PG (ref 26.6–33)
MCHC RBC AUTO-ENTMCNC: 33.5 G/DL (ref 31.5–35.7)
MCV RBC AUTO: 88.2 FL (ref 79–97)
MONOCYTES # BLD AUTO: 1 10*3/MM3 (ref 0.1–0.9)
MONOCYTES NFR BLD AUTO: 13.4 % (ref 5–12)
NEUTROPHILS NFR BLD AUTO: 5.2 10*3/MM3 (ref 1.7–7)
NEUTROPHILS NFR BLD AUTO: 69.7 % (ref 42.7–76)
NRBC BLD AUTO-RTO: 0 /100 WBC (ref 0–0.2)
PLATELET # BLD AUTO: 202 10*3/MM3 (ref 140–450)
PMV BLD AUTO: 8.8 FL (ref 6–12)
POTASSIUM SERPL-SCNC: 4 MMOL/L (ref 3.5–5.2)
POTASSIUM SERPL-SCNC: 4 MMOL/L (ref 3.5–5.2)
RBC # BLD AUTO: 4.5 10*6/MM3 (ref 4.14–5.8)
SODIUM SERPL-SCNC: 136 MMOL/L (ref 136–145)
WBC NRBC COR # BLD: 7.5 10*3/MM3 (ref 3.4–10.8)

## 2022-12-09 PROCEDURE — 99232 SBSQ HOSP IP/OBS MODERATE 35: CPT | Performed by: NURSE PRACTITIONER

## 2022-12-09 PROCEDURE — 93306 TTE W/DOPPLER COMPLETE: CPT | Performed by: INTERNAL MEDICINE

## 2022-12-09 PROCEDURE — 85730 THROMBOPLASTIN TIME PARTIAL: CPT | Performed by: INTERNAL MEDICINE

## 2022-12-09 PROCEDURE — 82962 GLUCOSE BLOOD TEST: CPT

## 2022-12-09 PROCEDURE — 93306 TTE W/DOPPLER COMPLETE: CPT

## 2022-12-09 PROCEDURE — 83735 ASSAY OF MAGNESIUM: CPT | Performed by: INTERNAL MEDICINE

## 2022-12-09 PROCEDURE — 80048 BASIC METABOLIC PNL TOTAL CA: CPT | Performed by: NURSE PRACTITIONER

## 2022-12-09 PROCEDURE — 85025 COMPLETE CBC W/AUTO DIFF WBC: CPT | Performed by: INTERNAL MEDICINE

## 2022-12-09 RX ORDER — ATORVASTATIN CALCIUM 40 MG/1
40 TABLET, FILM COATED ORAL NIGHTLY
Qty: 90 TABLET | Refills: 3 | Status: SHIPPED | OUTPATIENT
Start: 2022-12-09

## 2022-12-09 RX ORDER — CLOPIDOGREL BISULFATE 75 MG/1
75 TABLET ORAL DAILY
Qty: 30 TABLET | Refills: 3 | Status: SHIPPED | OUTPATIENT
Start: 2022-12-10

## 2022-12-09 RX ORDER — POLYETHYLENE GLYCOL 3350 17 G/17G
17 POWDER, FOR SOLUTION ORAL DAILY PRN
Qty: 30 EACH | Refills: 0 | Status: SHIPPED | OUTPATIENT
Start: 2022-12-09

## 2022-12-09 RX ORDER — ASPIRIN 81 MG/1
81 TABLET ORAL DAILY
Qty: 30 TABLET | Refills: 0 | Status: SHIPPED | OUTPATIENT
Start: 2022-12-10

## 2022-12-09 RX ORDER — LISINOPRIL 5 MG/1
5 TABLET ORAL
Qty: 30 TABLET | Refills: 0 | Status: SHIPPED | OUTPATIENT
Start: 2022-12-10

## 2022-12-09 RX ORDER — NITROGLYCERIN 0.4 MG/1
0.4 TABLET SUBLINGUAL
Qty: 30 TABLET | Refills: 12 | Status: SHIPPED | OUTPATIENT
Start: 2022-12-09

## 2022-12-09 RX ORDER — AMOXICILLIN 250 MG
2 CAPSULE ORAL 2 TIMES DAILY
Qty: 30 TABLET | Refills: 0 | Status: SHIPPED | OUTPATIENT
Start: 2022-12-09

## 2022-12-09 RX ORDER — METOPROLOL SUCCINATE 25 MG/1
25 TABLET, EXTENDED RELEASE ORAL
Qty: 30 TABLET | Refills: 0 | Status: SHIPPED | OUTPATIENT
Start: 2022-12-10

## 2022-12-09 RX ADMIN — Medication 3 ML: at 08:14

## 2022-12-09 RX ADMIN — METOPROLOL SUCCINATE 25 MG: 25 TABLET, EXTENDED RELEASE ORAL at 08:11

## 2022-12-09 RX ADMIN — Medication 10 ML: at 08:14

## 2022-12-09 RX ADMIN — LISINOPRIL 5 MG: 5 TABLET ORAL at 08:12

## 2022-12-09 RX ADMIN — SENNOSIDES AND DOCUSATE SODIUM 2 TABLET: 50; 8.6 TABLET ORAL at 08:11

## 2022-12-09 RX ADMIN — ASPIRIN 81 MG: 81 TABLET, COATED ORAL at 08:13

## 2022-12-09 RX ADMIN — OXYCODONE HYDROCHLORIDE AND ACETAMINOPHEN 500 MG: 500 TABLET ORAL at 10:17

## 2022-12-09 RX ADMIN — GABAPENTIN 600 MG: 300 CAPSULE ORAL at 08:12

## 2022-12-09 RX ADMIN — CLOPIDOGREL BISULFATE 75 MG: 75 TABLET ORAL at 08:14

## 2022-12-09 NOTE — PLAN OF CARE
Problem: Adult Inpatient Plan of Care  Goal: Plan of Care Review  Outcome: Ongoing, Progressing  Goal: Patient-Specific Goal (Individualized)  Outcome: Ongoing, Progressing  Goal: Absence of Hospital-Acquired Illness or Injury  Outcome: Ongoing, Progressing  Intervention: Identify and Manage Fall Risk  Recent Flowsheet Documentation  Taken 12/8/2022 2200 by Sia Chau RN  Safety Promotion/Fall Prevention:   activity supervised   fall prevention program maintained   clutter free environment maintained   lighting adjusted   room organization consistent   safety round/check completed  Taken 12/8/2022 2030 by Sia Chau RN  Safety Promotion/Fall Prevention:   fall prevention program maintained   activity supervised   lighting adjusted   safety round/check completed   room organization consistent   clutter free environment maintained  Intervention: Prevent Skin Injury  Recent Flowsheet Documentation  Taken 12/8/2022 2030 by Sia Chau RN  Skin Protection: adhesive use limited  Intervention: Prevent Infection  Recent Flowsheet Documentation  Taken 12/8/2022 2200 by Sia Chau RN  Infection Prevention: rest/sleep promoted  Taken 12/8/2022 2030 by Sia Chau RN  Infection Prevention: rest/sleep promoted  Goal: Optimal Comfort and Wellbeing  Outcome: Ongoing, Progressing  Intervention: Provide Person-Centered Care  Recent Flowsheet Documentation  Taken 12/8/2022 2030 by Sia Chau RN  Trust Relationship/Rapport:   care explained   choices provided  Goal: Readiness for Transition of Care  Outcome: Ongoing, Progressing     Problem: Chest Pain  Goal: Resolution of Chest Pain Symptoms  Outcome: Ongoing, Progressing   Goal Outcome Evaluation:

## 2022-12-09 NOTE — DISCHARGE SUMMARY
[facility] Medicine Services  DISCHARGE SUMMARY    Patient Name: Wilfredo Mcnamara  : 1940  MRN: [id number]    Date of Admission: 2022  Discharge Diagnosis: *chest pain  Date of Discharge: 22  Primary Care Physician: Nigel Tinoco MD      Presenting Problem:   NSTEMI (non-ST elevated myocardial infarction) ([facility]) [I21.4]    Active and Resolved Hospital Problems:  Active Hospital Problems    Diagnosis POA   • **NSTEMI (non-ST elevated myocardial infarction) ([facility]) [I21.4] Yes      Resolved Hospital Problems   No resolved problems to display.     NSTEMI  -Chest x-ray no acute findings  -EKG Sinus rhythm Atrial premature complex Prolonged CA interval LVH with secondary repolarization abnormality Anterior Q waves, possibly due to LVH  -Troponins 0.366 and 0.321.  -heparin gtt   -Patient has been initiated on metoprolol by Dr. Yuen at [facility]        DM2  -Current glucose  -Hold home diabetic medicines  -SSI  -Check blood glucose before meals and at bedtime    Hyperlipidemia  -Continue statin therapy       Hospital Course     Hospital Course:   History of Present Illness: Wilfredo Mcnamara is a 82 y.o. male with a past medical history to include diabetes type 2 with peripheral neuropathy and hyperlipidemia who presented to [facility] on 2022 complaining of chest pain.  Patient reports that on  he began having chest pain that radiated to both arms that resolved.  He thought perhaps he had indigestion from a poor lunch.  He reports that he had no other symptoms associated with the chest pain and did not go to have evaluation.  He reports that he went to the [facility] on Monday and worked out as per usual and had no chest pains.  He states that today he did go to the [facility] and worked out and about 25 minutes into his bike ride he began having the same chest pain that radiated down both arms, this time half the intensity as the first.  He states this lasted  a little while but did resolve.  He decided to go to [facility] for evaluation and it was found there that he had elevated troponins.  He is currently asymptomatic.  He denies having any nausea, vomiting, diaphoresis.  He denies classic lightheadedness, syncopal episodes or dizziness.  He denies having any shortness of air and is not having any chest pains at this time.  He denies abdomen pain, constipation or diarrhea.  He denies having lower extremity edema.  [facility] contacted us to have him admitted for further evaluation and treatment.  He was placed on a heparin drip and transported.     Emergency room work-up vital signs stable.  95% on room air.  Troponin 0.366 and 0.321.  Pro time 10.9.  INR 1.06.  PTT 27.8.  Glucose 204.  Creatinine 1.48.  Calcium 4.3.  White blood count 6.25.  Hemoglobin 14.2.  Platelets 216.     Chest x-ray no acute chest findings.     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  He underwent PCI with drug-eluting stent placement under IVUS guidance.  3.5 x 23 mm Xience ruperto point stent was placed and postdilated with 4.5 mm balloon.   started him on ACE inhibitor, beta-blocker and high intensity statin. His LDL is 132, total cholesterol 201, HDL 36, triglyceride 183..  His HbA1c 6.9  He has been started on aspirin and Plavix which she will continue for 1 year.  He will benefit from cardiac rehab although patient exercises every day and at the [facility].  12/9/22 patient seen and examined in bed acute distress, doing better today, anxious to go home.  Discussed with RN.  Will discharge patient home, condition at discharge stable.    DISCHARGE Follow Up Recommendations for labs and diagnostics: Follow-up with PCP in a week  Follow-up with cardiology in 2  weeks    Reasons For Change In Medications and Indications for New Medications:   START taking:   aspirin    atorvastatin (LIPITOR)    clopidogrel (PLAVIX)    lisinopril (PRINIVIL,ZESTRIL)    metoprolol succinate XL (TOPROL-XL)    nitroglycerin (NITROSTAT)    polyethylene glycol (MIRALAX)    sennosides-docusate (PERICOLACE)   Day of Discharge     Vital Signs:  Temp:  [96.8 °F (36 °C)-98.3 °F (36.8 °C)] 98.3 °F (36.8 °C)  Heart Rate:  [58-70] 65  Resp:  [14-20] 18  BP: (104-192)/() 136/56      Physical Exam *Constitutional:       General: He is not in acute distress.     Appearance: He is obese.   HENT:      Head: Normocephalic and atraumatic.   Cardiovascular:      Rate and Rhythm: Normal rate and regular rhythm.      Pulses: Normal pulses.      Heart sounds: Normal heart sounds.   Pulmonary:      Effort: Pulmonary effort is normal.      Breath sounds: Normal breath sounds.   Abdominal:      General: Bowel sounds are normal.      Palpations: Abdomen is soft.      Tenderness: There is no abdominal tenderness.   Musculoskeletal:         General: Normal range of motion.      Cervical back: Normal range of motion and neck supple.   Skin:     General: Skin is warm and dry.   Neurological:      General: No focal deficit present.      Mental Status: He is alert and oriented to person, place, and time.   Psychiatric:         Mood and Affect: Mood normal.     Pertinent  and/or Most Recent Results     LAB RESULTS:      Lab 12/09/22  0603 12/08/22  1309 12/08/22  0347 12/07/22  2051 12/07/22  1239 12/07/22  1110   WBC 7.50  --  5.50  --   --  6.25   HEMOGLOBIN 13.3  --  12.9*  --   --  14.2   HEMATOCRIT 39.7  --  38.5  --   --  42.0   PLATELETS 202  --  186  --   --  216   NEUTROS ABS 5.20  --  3.30  --   --  4.18   IMMATURE GRANS (ABS)  --   --   --   --   --  0.00   LYMPHS ABS 1.00  --  1.20  --   --  1.09   MONOS ABS 1.00*  --  0.80  --   --  0.83   EOS ABS 0.20  --  0.20  --   --  0.11   MCV 88.2  --  88.9  --   --   87.1   PROTIME  --   --   --   --  10.9  --    APTT 30.8* >139.0* 40.1* 33.0* 27.8*  --          Lab 12/09/22  0603 12/08/22  0347 12/07/22  1844 12/07/22  1110   SODIUM  --  138  --  137   POTASSIUM 4.0 3.9  --  4.3   CHLORIDE  --  102  --  101   CO2  --  24.0  --  26.8   ANION GAP  --  12.0  --  9.2   BUN  --  22  --  22   CREATININE  --  1.37*  --  1.48*   EGFR  --  52.1*  --  47.5*   GLUCOSE  --  129*  --  204*   CALCIUM  --  9.0  --  9.7   MAGNESIUM 1.8 1.5*  --   --    HEMOGLOBIN A1C  --  6.9*  --   --    TSH  --   --  1.200  --          Lab 12/07/22  1110   TOTAL PROTEIN 7.6   ALBUMIN 4.38   GLOBULIN 3.2   ALT (SGPT) 18   AST (SGOT) 25   BILIRUBIN 0.6   ALK PHOS 78         Lab 12/07/22  1301 12/07/22  1239 12/07/22  1110   TROPONIN T 0.321*  --  0.366*   PROTIME  --  10.9  --    INR  --  1.06  --          Lab 12/08/22  0347   CHOLESTEROL 201*   LDL CHOL 132*   HDL CHOL 36*   TRIGLYCERIDES 183*             Brief Urine Lab Results     None        Microbiology Results (last 10 days)     ** No results found for the last 240 hours. **          XR Chest 1 View    Result Date: 12/7/2022  Impression: No acute chest findings.  Electronically Signed By-Lexie Keen MD On:12/7/2022 11:22 AM This report was finalized on [id number] by  Lexie Keen MD.                  Labs Pending at Discharge:  Pending Labs     Order Current Status    Basic Metabolic Panel In process          Procedures Performed  Procedure(s):  Left Heart Cath and coronary angiogram  Percutaneous Coronary Intervention  Intravascular Ultrasound         Consults:   Consults     Date and Time Order Name Status Description    12/7/2022  4:09 PM Inpatient Cardiology Consult Completed         ASSESSMENT & PLAN:     Principal Problem:    NSTEMI (non-ST elevated myocardial infarction) (HCC)  Hypertension  Hyperlipidemia  Coronary artery disease  Diabetes     82-year-old man with hypertension, hyperlipidemia, diabetes presents with chest pain.  His ECG  showed subtle ST depressions in the lateral leads with nonspecific T wave changes.  He was noted to have elevated troponin   He was started on heparin drip per ACS protocol.  We immediately took him to the Cath Lab where he was noted to have 99% lesion in his left circumflex artery.  He underwent PCI with drug-eluting stent placement under IVUS guidance.  3.5 x 23 mm Xience ruperto point stent was placed and postdilated with 4.5 mm balloon.  Will now started him on ACE inhibitor, beta-blocker and high intensity statin.  His LDL is 132, total cholesterol 201, HDL 36, triglyceride 183..  His HbA1c 6.9  He has been started on aspirin and Plavix which she will continue for 1 year.  He will benefit from cardiac rehab although patient exercises every day and at the [facility].  Once this TR band is removed he may be discharged from cardiac standpoint with plans to follow-up with me in 2 weeks.  He needs an echocardiogram which I can obtain as an outpatient        Haris Estrada MD  12/08/22  16:30 EST    Discharge Details        Discharge Medications      New Medications      Instructions Start Date   aspirin 81 MG EC tablet   81 mg, Oral, Daily   Start Date: December 10, 2022     atorvastatin 40 MG tablet  Commonly known as: LIPITOR   40 mg, Oral, Nightly      clopidogrel 75 MG tablet  Commonly known as: PLAVIX   75 mg, Oral, Daily   Start Date: December 10, 2022     lisinopril 5 MG tablet  Commonly known as: PRINIVIL,ZESTRIL   5 mg, Oral, Every 24 Hours Scheduled   Start Date: December 10, 2022     metoprolol succinate XL 25 MG 24 hr tablet  Commonly known as: TOPROL-XL   25 mg, Oral, Every 24 Hours Scheduled   Start Date: December 10, 2022     nitroglycerin 0.4 MG SL tablet  Commonly known as: NITROSTAT   0.4 mg, Sublingual, Every 5 Minutes PRN, Take no more than 3 doses in 15 minutes.      polyethylene glycol 17 g packet  Commonly known as: MIRALAX   17 g, Oral, Daily PRN      sennosides-docusate 8.6-50 MG per tablet  Commonly  known as: PERICOLACE   2 tablets, Oral, 2 Times Daily         Continue These Medications      Instructions Start Date   ascorbic acid 500 MG tablet  Commonly known as: VITAMIN C   500 mg, Oral, Daily      gabapentin 300 MG capsule  Commonly known as: NEURONTIN   600 mg, Oral, 2 Times Daily      glimepiride 2 MG tablet  Commonly known as: AMARYL   2 mg, Oral, Daily      Mens 50+ Multi Vitamin/Min tablet tablet   1 tablet, Oral, Daily      metFORMIN 500 MG tablet  Commonly known as: GLUCOPHAGE   1,000 mg, Oral, 2 Times Daily With Meals             No Known Allergies      Discharge Disposition:   Home or Self Care    Diet:  Hospital:  Diet Order   Procedures   • Diet: Cardiac Diets, Diabetic Diets; Healthy Heart (2-3 Na+); Consistent Carbohydrate; Texture: Regular Texture (IDDSI 7); Fluid Consistency: Thin (IDDSI 0)         Discharge Activity:   Activity Instructions     Gradually Increase Activity Until at Pre-Hospitalization Level              CODE STATUS:  Code Status and Medical Interventions:   Ordered at: 12/07/22 1607     Level Of Support Discussed With:    Patient     Code Status (Patient has no pulse and is not breathing):    CPR (Attempt to Resuscitate)     Medical Interventions (Patient has pulse or is breathing):    Full Support         No future appointments.    Additional Instructions for the Follow-ups that You Need to Schedule     Ambulatory Referral to Cardiac Rehab   As directed      Discharge Follow-up with PCP   As directed       Currently Documented PCP:    Nigel Tinoco MD    PCP Phone Number:    [phone]     Follow Up Details: 1 week               Time spent on Discharge including face to face service:35 minutes    This patient has been examined wearing appropriate Personal Protective Equipment and discussed with RN. 12/09/22      Signature: Electronically signed by Gary Mandujano MD, 12/09/22, 11:27 AM EST.

## 2022-12-09 NOTE — PROGRESS NOTES
"Referring Provider: Cortes Mandujano MD    Reason for follow-up: Non-ST elevation MI, status post PCI     Patient Care Team:  Nigel Tinoco MD as PCP - General (Internal Medicine)      SUBJECTIVE  Patient denies any current complaints, including chest pain or shortness of breath.        ROS    Review of Systems   Cardiovascular: Negative for chest pain.   Respiratory: Negative for shortness of breath.    All other systems reviewed and are negative.        Personal History:    Past Medical History:   Diagnosis Date   • Diabetes (HCC)    • Essential hypertension    • Hyperlipidemia 03/05/2013   • Obstructive sleep apnea syndrome 05/02/2022       Past Surgical History:   Procedure Laterality Date   • CARDIAC CATHETERIZATION  12/08/2022   • CORONARY STENT PLACEMENT  12/07/2022    Status post PCI of left circumflex, the culprit vessel with IVUS guidance       Family History   Family history unknown: Yes       Social History     Tobacco Use   • Smoking status: Never     Passive exposure: Never   • Smokeless tobacco: Never   Vaping Use   • Vaping Use: Never used   Substance Use Topics   • Alcohol use: Not Currently   • Drug use: Never        No medications prior to admission.       Allergies:  Patient has no known allergies.    Scheduled Meds:  Continuous Infusions:No current facility-administered medications for this encounter.    PRN Meds:.      OBJECTIVE    Vital Signs  Vitals:    12/09/22 0000 12/09/22 0545 12/09/22 0727 12/09/22 0914   BP: 146/77 104/52 136/56 136/56   BP Location: Right arm  Left arm    Patient Position: Lying  Lying    Pulse: 65 67 65    Resp: 20 18 18    Temp: 98.1 °F (36.7 °C) 96.8 °F (36 °C) 98.3 °F (36.8 °C)    TempSrc: Temporal Temporal Temporal    SpO2: 93% 96% 94%    Weight:    102 kg (224 lb)   Height:    193 cm (76\")       Flowsheet Rows    Flowsheet Row First Filed Value   Admission Height 193 cm (76\") Documented at 12/07/2022 1104   Admission Weight 101 kg (223 lb 6.4 oz) " Documented at 12/07/2022 1104          No intake or output data in the 24 hours ending 12/09/22 1759       Telemetry: Sinus rhythm    Physical Exam:  The patient is alert, oriented and in no distress.  Vital signs as noted above.  Head and neck revealed no carotid bruits or jugular venous distention.  No thyromegaly or lymphadenopathy is present  Lungs clear.  No wheezing.  Breath sounds are normal bilaterally.  Heart normal first and second heart sounds.  No murmur. No precordial rub is present.  No gallop is present.  Abdomen soft and nontender.  No organomegaly is present.  Extremities with good peripheral pulses without any pedal edema.  Skin warm and dry.   Musculoskeletal system is grossly normal.  CNS grossly normal.       Results Review:  I have personally reviewed the results from the time of this admission to 12/9/2022 17:59 EST and agree with these findings:  [x]  Laboratory  []  Microbiology  [x]  Radiology  [x]  EKG/Telemetry   [x]  Cardiology/Vascular   []  Pathology  []  Old records  []  Other:    Most notable findings include:    Lab Results (last 24 hours)     Procedure Component Value Units Date/Time    Basic Metabolic Panel [[id number]]  (Abnormal) Collected: 12/09/22 0603    Specimen: Blood Updated: 12/09/22 0930     Glucose 144 mg/dL      BUN 22 mg/dL      Creatinine 1.45 mg/dL      Sodium 136 mmol/L      Potassium 4.0 mmol/L      Comment: Slight hemolysis detected by analyzer. Results may be affected.        Chloride 98 mmol/L      CO2 23.0 mmol/L      Calcium 9.1 mg/dL      BUN/Creatinine Ratio 15.2     Anion Gap 15.0 mmol/L      eGFR 48.7 mL/min/1.73      Comment: National Kidney Foundation and American Society of Nephrology (ASN) Task Force recommended calculation based on the Chronic Kidney Disease Epidemiology Collaboration (CKD-EPI) equation refit without adjustment for race.       Narrative:      GFR Normal >60  Chronic Kidney Disease <60  Kidney Failure <15    The GFR formula is only  valid for adults with stable renal function between ages 18 and 70.    POC Glucose Once [[id number]]  (Abnormal) Collected: 12/09/22 0726    Specimen: Blood Updated: 12/09/22 0727     Glucose 149 mg/dL      Comment: Serial Number: [id number]:  [id number]       aPTT [[id number]]  (Abnormal) Collected: 12/09/22 0603    Specimen: Blood Updated: 12/09/22 0654     PTT 30.8 seconds     Magnesium [[id number]]  (Normal) Collected: 12/09/22 0603    Specimen: Blood Updated: 12/09/22 0646     Magnesium 1.8 mg/dL     Potassium [[id number]]  (Normal) Collected: 12/09/22 0603    Specimen: Blood Updated: 12/09/22 0646     Potassium 4.0 mmol/L     CBC & Differential [[id number]]  (Abnormal) Collected: 12/09/22 0603    Specimen: Blood Updated: 12/09/22 0632    Narrative:      The following orders were created for panel order CBC & Differential.  Procedure                               Abnormality         Status                     ---------                               -----------         ------                     CBC Auto Differential[[id number]]        Abnormal            Final result                 Please view results for these tests on the individual orders.    CBC Auto Differential [[id number]]  (Abnormal) Collected: 12/09/22 0603    Specimen: Blood Updated: 12/09/22 0632     WBC 7.50 10*3/mm3      RBC 4.50 10*6/mm3      Hemoglobin 13.3 g/dL      Hematocrit 39.7 %      MCV 88.2 fL      MCH 29.5 pg      MCHC 33.5 g/dL      RDW 15.2 %      RDW-SD 49.4 fl      MPV 8.8 fL      Platelets 202 10*3/mm3      Neutrophil % 69.7 %      Lymphocyte % 13.9 %      Monocyte % 13.4 %      Eosinophil % 2.2 %      Basophil % 0.8 %      Neutrophils, Absolute 5.20 10*3/mm3      Lymphocytes, Absolute 1.00 10*3/mm3      Monocytes, Absolute 1.00 10*3/mm3      Eosinophils, Absolute 0.20 10*3/mm3      Basophils, Absolute 0.10 10*3/mm3      nRBC 0.0 /100 WBC     POC Glucose Once [[id number]]  (Normal) Collected: 12/08/22 1937    Specimen: Blood Updated:  12/08/22 1939     Glucose 83 mg/dL      Comment: Serial Number: [id number]:  [id number]       POC Activated Clotting Time [[id number]]  (Abnormal) Collected: 12/08/22 1230    Specimen: Arterial Blood Updated: 12/08/22 1557     Activated Clotting Time  257 Seconds      Comment: Serial Number: [id number]:  [id number]       aPTT [[id number]]  (Abnormal) Collected: 12/08/22 1309    Specimen: Blood Updated: 12/08/22 1420     PTT >139.0 seconds      Comment: Result checked               Imaging Results (Last 24 Hours)     ** No results found for the last 24 hours. **          LAB RESULTS (LAST 7 DAYS)    CBC  Results from last 7 days   Lab Units 12/09/22  0603 12/08/22  0347 12/07/22  1110   WBC 10*3/mm3 7.50 5.50 6.25   RBC 10*6/mm3 4.50 4.33 4.82   HEMOGLOBIN g/dL 13.3 12.9* 14.2   HEMATOCRIT % 39.7 38.5 42.0   MCV fL 88.2 88.9 87.1   PLATELETS 10*3/mm3 202 186 216       BMP  Results from last 7 days   Lab Units 12/09/22  0603 12/08/22  0347 12/07/22  1110   SODIUM mmol/L 136 138 137   POTASSIUM mmol/L 4.0  4.0 3.9 4.3   CHLORIDE mmol/L 98 102 101   CO2 mmol/L 23.0 24.0 26.8   BUN mg/dL 22 22 22   CREATININE mg/dL 1.45* 1.37* 1.48*   GLUCOSE mg/dL 144* 129* 204*   MAGNESIUM mg/dL 1.8 1.5*  --        CMP   Results from last 7 days   Lab Units 12/09/22  0603 12/08/22  0347 12/07/22  1110   SODIUM mmol/L 136 138 137   POTASSIUM mmol/L 4.0  4.0 3.9 4.3   CHLORIDE mmol/L 98 102 101   CO2 mmol/L 23.0 24.0 26.8   BUN mg/dL 22 22 22   CREATININE mg/dL 1.45* 1.37* 1.48*   GLUCOSE mg/dL 144* 129* 204*   ALBUMIN g/dL  --   --  4.38   BILIRUBIN mg/dL  --   --  0.6   ALK PHOS U/L  --   --  78   AST (SGOT) U/L  --   --  25   ALT (SGPT) U/L  --   --  18       BNP        TROPONIN  Results from last 7 days   Lab Units 12/07/22  1301   TROPONIN T ng/mL 0.321*       CoAg  Results from last 7 days   Lab Units 12/09/22  0603 12/08/22  1309 12/08/22  0347 12/07/22  2051 12/07/22  1239   INR   --   --   --   --  1.06   APTT seconds  30.8* >139.0* 40.1* 33.0* 27.8*       Creatinine Clearance  Estimated Creatinine Clearance: 58.6 mL/min (A) (by C-G formula based on SCr of 1.45 mg/dL (H)).    ABG        Radiology  No radiology results for the last day      EKG  I personally viewed and interpreted the patient's EKG/Telemetry data:  ECG 12 Lead   ED Interpretation   Abnormal   Micky Yuen MD     12/7/2022  1:08 PM   ECG 12 Lead         Date/Time: 12/7/2022 11:38 AM   Performed by: Micky Yuen MD   Authorized by: Micky Yuen MD    Comparison: compared with previous ECG    Rhythm: sinus rhythm   Rate: normal   Clinical impression: abnormal ECG and non-specific ECG         ECG 12 Lead ED Triage Standing Order; Chest Pain   Final Result   HEART RATE= 86  bpm   RR Interval= 700  ms   AZ Interval= 243  ms   P Horizontal Axis= -29  deg   P Front Axis= 49  deg   QRSD Interval= 98  ms   QT Interval= 398  ms   QRS Axis= -36  deg   T Wave Axis= 114  deg   - ABNORMAL ECG -   Sinus rhythm   Atrial premature complex   Prolonged AZ interval   LVH with secondary repolarization abnormality   Anterior Q waves, possibly due to LVH   When compared with ECG of 24-May-2022 10:51:29,   New or worsened ischemia or infarction   Significant repolarization change   Significant axis, voltage or hypertrophy change   Electronically Signed By: Micky Yuen (GER) 07-Dec-2022 13:24:04   Date and Time of Study: 2022-12-07 11:12:04      SCANNED - TELEMETRY     Final Result      SCANNED - TELEMETRY     Final Result      SCANNED - TELEMETRY     Final Result      SCANNED - TELEMETRY     Final Result      SCANNED - TELEMETRY     Final Result      SCANNED - TELEMETRY     Final Result      SCANNED EKG   Final Result            Echocardiogram:          Stress Test:         Cardiac Catheterization:  Results for orders placed during the hospital encounter of 12/07/22    Cardiac Catheterization/Vascular Study (Needs Review)  This result has not been signed. Information might be  incomplete.    Narrative  OPERATORS:  1. Haris Estrada M.D., Attending Cardiologist      PROCEDURE PERFORMED.  Ultrasound guided vascular access  Left heart catheterization  Coronary Angiogram 61473  Percutaneous coronary intervention with drug-eluting stent placement to LAD 47162  Intravascular ultrasound LAD 89360  Moderate Sedation 50 minutes    INDICATIONS FOR PROCEDURE.  82-year-old man presented with acute non-ST elevation myocardial infarction.  After discussing the risk and benefit of cardiac catheterization he was brought into the Cath Lab for urgent procedure    PROCEDURE IN DETAIL.  Informed consent was obtained from the patient after explaining the risks, benefits, and alternative options of the procedure. After obtaining informed consent, the patient was brought to the cath lab and was prepped in a sterile fashion. Lidocaine 1% was used for local anesthesia into the right radial access site. The right radial artery was accessed under direct ultrasound visualization  with an angiocath needle via modified Seldinger technique. A 6F slender sheath was inserted successfully. Afterwards, 5F JR4  was advanced over a wire into the ascending aorta and used to cross the AV and obtain LV pressures.  AV gradient obtained via pullback technique. JR4 and JL3.5 diagnostic catheters were used to engage the ostia of the RCA and LM respectively. Images of the right and left coronary systems were obtained.    HEMODYNAMICS.  LV: 132/10, 17 mmHg  AO: 133/74, 100 mmHg    No significant gradient across aortic valve during pullback of JR4 catheter.    MEDICATIONS.  1. Versed  2. Fentanyl  3. Verapamil  4. Nitro  5. Heparin    FINDINGS.    Coronary Angiogram.    1. Left main. Left main is a large-caliber vessel which gives rise to the Left Anterior Descending and the Left circumflex.  Left main has no significant disease    2. Left Anterior Descending Artery. LAD is a large vessel which gives rise to several septal perforators  and several diagonal branches.  In the midsegment it bifurcates into a diagonal and dual LAD system.  This has diffuse disease 50% throughout    3. Left Circumflex. The LCx is a meidum caliber which gives rise to marginals.  There is a 99% hazy looking lesion in the mid left circumflex with MARK II flow.    4. Right Coronary Artery. The RCA is a dominant vessel which gives rise to several small caliber branches.  PLV has 70% lesion in a small vessel 2 mm.  Overall RCA has luminal irregularities    Percutaneous coronary intervention  10,000 units of heparin were administered and ACT of more than 250 was documented.  Left main ostium was engaged with 6 [place] of [place] XB 3.5 guide catheter.  0.014 run-through wire was then advanced into the distal left circumflex artery.  Lesion in the mid circumflex was then predilated with 2.5 x 12 noncompliant balloon.  Over the run-through wire I then advanced intravascular ultrasound catheter and obtained measurements.  Distal left circumflex measured 4 mm in dimension, proximal left circumflex measured 4.5 mm in dimension.  IVUS also confirmed 80% thrombotic lesion in the mid left circumflex.  I then placed 3.5 x 23 mm Xience ruperto point stent at 14 ashley.  This was postdilated with 4.5 x 15 mm noncompliant balloon at 16 ashley.  Final angiography showed 0% stenosis and MARK-3 flow.  Final IVUS showed fully expanded stents with excellent apposition.    All the catheters were exchanged over a wire and subsequently removed. The patient tolerated the procedure well without any complications. The pictures were reviewed at the end of the procedure. TR band applied to right wrist for hemostasis and inflated with 15 cc of air. No complications were encountered.    IMPRESSIONS.  1.  One-vessel obstructive coronary artery disease  2.  Status post PCI of left circumflex, the culprit vessel with IVUS guidance    RECOMMENDATIONS.  1.  Start dual antiplatelet therapy  2. Exercise and lifestyle modifications  recommended.  3.  Risk factors modification  4.  Cardiac rehab referral         Other:         ASSESSMENT & PLAN:    Principal Problem:    NSTEMI (non-ST elevated myocardial infarction) ([facility])  Active Problems:    Hyperlipidemia    Type 2 diabetes mellitus ([facility])    Obstructive sleep apnea syndrome    Essential hypertension    Overweight (BMI 25.0-29.9)    Coronary artery disease involving native coronary artery of native heart with unstable angina pectoris ([facility])      82-year-old man with hypertension, hyperlipidemia, Type 2 diabetes presented with chest pain.  His ECG showed subtle ST depressions in the lateral leads with nonspecific T wave changes.  He was noted to have elevated troponin consistent with non-ST elevation myocardial infarction.   He was started on heparin drip per ACS protocol.  He underwent cardiac catheterization which revealed 99% lesion in his left circumflex artery.  He underwent PCI with drug-eluting stent placement under IVUS guidance.  3.5 x 23 mm Xience ruperto point stent was placed and postdilated with 4.5 mm balloon.  Continue DAPT with aspirin and Plavix x 1 year  Continue high intensity statin.  Continue Toprol XL 25 mg daily and lisinopril 5 mg daily  His LDL is 132, total cholesterol 201, HDL 36, triglyceride 183.  His HbA1c 6.9%  He will benefit from cardiac rehab although patient exercises every day and at the [facility].  Echocardiogram completed.    Patient can be discharged from cardiology standpoint. He will need to follow up with Dr. Estrada in the office in 2 weeks.     Electronically signed by MARKUS Guan, 12/09/22, 6:04 PM EST.                   Prophylactic measure

## 2022-12-10 ENCOUNTER — READMISSION MANAGEMENT (OUTPATIENT)
Dept: CALL CENTER | Facility: HOSPITAL | Age: 82
End: 2022-12-10

## 2022-12-10 LAB
BH CV ECHO MEAS - ACS: 2.6 CM
BH CV ECHO MEAS - AO MAX PG: 5.1 MMHG
BH CV ECHO MEAS - AO MEAN PG: 2.9 MMHG
BH CV ECHO MEAS - AO ROOT DIAM: 4.3 CM
BH CV ECHO MEAS - AO V2 MAX: 113 CM/SEC
BH CV ECHO MEAS - AO V2 VTI: 23.4 CM
BH CV ECHO MEAS - AVA(I,D): 3.3 CM2
BH CV ECHO MEAS - EDV(CUBED): 199.4 ML
BH CV ECHO MEAS - EDV(MOD-SP4): 180.2 ML
BH CV ECHO MEAS - EF(MOD-SP4): 49.5 %
BH CV ECHO MEAS - ESV(CUBED): 72.7 ML
BH CV ECHO MEAS - ESV(MOD-SP4): 91 ML
BH CV ECHO MEAS - FS: 28.6 %
BH CV ECHO MEAS - IVS/LVPW: 0.96 CM
BH CV ECHO MEAS - IVSD: 1.26 CM
BH CV ECHO MEAS - LA A2CS (ATRIAL LENGTH): 5.4 CM
BH CV ECHO MEAS - LV DIASTOLIC VOL/BSA (35-75): 77.5 CM2
BH CV ECHO MEAS - LV MASS(C)D: 330.5 GRAMS
BH CV ECHO MEAS - LV MAX PG: 2.9 MMHG
BH CV ECHO MEAS - LV MEAN PG: 1.51 MMHG
BH CV ECHO MEAS - LV SYSTOLIC VOL/BSA (12-30): 39.2 CM2
BH CV ECHO MEAS - LV V1 MAX: 84.5 CM/SEC
BH CV ECHO MEAS - LV V1 VTI: 18.5 CM
BH CV ECHO MEAS - LVIDD: 5.8 CM
BH CV ECHO MEAS - LVIDS: 4.2 CM
BH CV ECHO MEAS - LVOT AREA: 4.2 CM2
BH CV ECHO MEAS - LVOT DIAM: 2.32 CM
BH CV ECHO MEAS - LVPWD: 1.31 CM
BH CV ECHO MEAS - MV A MAX VEL: 121.5 CM/SEC
BH CV ECHO MEAS - MV DEC SLOPE: 373.8 CM/SEC2
BH CV ECHO MEAS - MV DEC TIME: 0.15 MSEC
BH CV ECHO MEAS - MV E MAX VEL: 56 CM/SEC
BH CV ECHO MEAS - MV E/A: 0.46
BH CV ECHO MEAS - MV MAX PG: 6.4 MMHG
BH CV ECHO MEAS - MV MEAN PG: 2.6 MMHG
BH CV ECHO MEAS - MV V2 VTI: 29.8 CM
BH CV ECHO MEAS - MVA(VTI): 2.6 CM2
BH CV ECHO MEAS - PA V2 MAX: 97.7 CM/SEC
BH CV ECHO MEAS - PI END-D VEL: 133.8 CM/SEC
BH CV ECHO MEAS - QP/QS: 0.54
BH CV ECHO MEAS - RV MAX PG: 1.42 MMHG
BH CV ECHO MEAS - RV V1 MAX: 59.5 CM/SEC
BH CV ECHO MEAS - RV V1 VTI: 15.1 CM
BH CV ECHO MEAS - RVDD: 2.9 CM
BH CV ECHO MEAS - RVOT DIAM: 1.89 CM
BH CV ECHO MEAS - SI(MOD-SP4): 38.3 ML/M2
BH CV ECHO MEAS - SV(LVOT): 78.4 ML
BH CV ECHO MEAS - SV(MOD-SP4): 89.1 ML
BH CV ECHO MEAS - SV(RVOT): 42.4 ML
MAXIMAL PREDICTED HEART RATE: 140 BPM
STRESS TARGET HR: 119 BPM

## 2022-12-10 NOTE — OUTREACH NOTE
Prep Survey    Flowsheet Row Responses   [facility] facility patient discharged from? [facility]   Is LACE score < 7 ? No   Emergency Room discharge w/ pulse ox? No   Eligibility Readm Mgmt   Discharge diagnosis **NSTEMI (non-ST elevated myocardial infarction   Does the patient have one of the following disease processes/diagnoses(primary or secondary)? Acute MI (STEMI,NSTEMI)   Does the patient have Home health ordered? No   Is there a DME ordered? No   General alerts for this patient Left Heart Cath and coronary angiogram   Prep survey completed? Yes          NOEL AGUSTIN - Registered Nurse

## 2022-12-14 ENCOUNTER — READMISSION MANAGEMENT (OUTPATIENT)
Dept: CALL CENTER | Facility: HOSPITAL | Age: 82
End: 2022-12-14

## 2022-12-14 NOTE — OUTREACH NOTE
AMI Week 1 Survey    Flowsheet Row Responses   [facility] patient discharged from? [facility]   Does the patient have one of the following disease processes/diagnoses(primary or secondary)? Acute MI (STEMI,NSTEMI)   Week 1 attempt successful? Yes   Call start time 1026   Call end time 1030   General alerts for this patient Left Heart Cath and coronary angiogram   Discharge diagnosis **NSTEMI (non-ST elevated myocardial infarction   Medication alerts for this patient Pt is concerned and unhappy with the amount of medications he was instructed to take. He will discuss with PCP at f/u    Meds reviewed with patient/caregiver? Yes   Is the patient having any side effects they believe may be caused by any medication additions or changes? No   Does the patient have all prescriptions related to this admission filled (includes statins,anticoagulants,HTN meds,anti-arrhythmia meds) Yes   Is the patient taking all medications as directed (includes completed medication regime)? Yes   Does the patient have a primary care provider?  Yes   Does the patient have an appointment with their PCP,cardiologist,or clinic within 7 days of discharge? Yes   Has the patient kept scheduled appointments due by today? N/A   Psychosocial issues? No   Comments Pt denies chest pain or any issues. Right wrist is healing with small bruising faded.   Did the patient receive a copy of their discharge instructions? Yes   Nursing interventions Reviewed instructions with patient   What is the patient's perception of their health status since discharge? Improving   Nursing interventions Nurse provided patient education   Is the patient/caregiver able to teach back signs and symptoms of when to call for help immediately: Sudden chest discomfort, Sudden discomfort in arms, back, neck or jaw, Irregular or rapid heart rate   Nursing interventions Nurse provided patient education   Is the patient/caregiver able to teach back lifestyle changes to help prevent  MIs Regular exercise as approved by provider   Is the patient/caregiver able to teach back ways to prevent a second heart attack: Take medications   Is the patient/caregiver able to teach back the hierarchy of who to call/visit for symptoms/problems? PCP, Specialist, Home health nurse, Urgent Care, ED, 911 Yes   Week 1 call completed? Yes   Revoked No further contact(revokes)-requires comment   Is the patient interested in additional calls from an ambulatory ?  NOTE:  applies to high risk patients requiring additional follow-up. No   Graduated/Revoked comments pt was busy doing something else at this time, short call, denied questions or concerns.          TORREY WU - Registered Nurse

## 2022-12-19 NOTE — PROGRESS NOTES
Encounter Date:12/20/2022      Patient ID: Wilfredo Mcnamara is a 82 y.o. male.    Chief Complaint:      History of Present Illness  Wilfredo Mcnamara is a 82 y.o. male with hypertension, hyperlipidemia, diabetes with peripheral neuropathy who presented to the hospital with complaints of chest pain.  He was diagnosed with non-ST elevation MI with changes in his ECG and rising troponins..   He began having chest pain 2 days prior to presenting to the hospital.  The pain radiated to both his arms.  He initially attributed this to indigestion from a poor lunch however the pain persisted.  He also went to [facility] on Monday and worked out as usual with no chest pain.  However while working out for about 25 minutes he began having chest pain radiating down his arms.  He decided to go to [facility] for evaluation and was found to have elevated troponin.    He was taken to the Cath Lab urgently and was noted to have 99% thrombotic lesion in his mid left circumflex artery which was treated with placement of a drug-eluting stent on 12/8/2022.   3.5 x 23 mm Xience ruperto point stent was placed and was postdilated with 4.5 mm balloon.  He was started on dual antiplatelet therapy.  Of note there was 40 to 50% diffuse disease in the LAD and 70% lesion in the small PLV.  An echocardiogram showed preserved LV function, grade 1 diastolic dysfunction with mild dilatation of aortic root 4.3 cm.    He is chest pain-free and denies any shortness of breath.  He wants to know if he can return to [facility] to continue his workout.  He is accompanied by his wife today.  He wants to do a new story on his case as he still surprised about having a non-ST elevation MI.    The following portions of the patient's history were reviewed and updated as appropriate: allergies, current medications, past family history, past medical history, past social history, past surgical history and problem list.    Review of Systems   Constitutional: Negative for  malaise/fatigue.         Current Outpatient Medications:   •  ascorbic acid (VITAMIN C) 500 MG tablet, Take 500 mg by mouth Daily., Disp: , Rfl:   •  aspirin 81 MG EC tablet, Take 1 tablet by mouth Daily., Disp: 30 tablet, Rfl: 0  •  atorvastatin (LIPITOR) 40 MG tablet, Take 1 tablet by mouth Every Night., Disp: 90 tablet, Rfl: 3  •  clopidogrel (PLAVIX) 75 MG tablet, Take 1 tablet by mouth Daily., Disp: 30 tablet, Rfl: 3  •  clotrimazole-betamethasone (LOTRISONE) 1-0.05 % cream, Apply to affected areas on legs BID x 4 weeks, Disp: , Rfl:   •  gabapentin (NEURONTIN) 300 MG capsule, Take 600 mg by mouth 2 (Two) Times a Day., Disp: , Rfl:   •  glimepiride (AMARYL) 2 MG tablet, Take 2 mg by mouth Daily., Disp: , Rfl:   •  lisinopril (PRINIVIL,ZESTRIL) 5 MG tablet, Take 1 tablet by mouth Daily., Disp: 30 tablet, Rfl: 0  •  metFORMIN (GLUCOPHAGE) 500 MG tablet, Take 1,000 mg by mouth 2 (Two) Times a Day With Meals., Disp: , Rfl:   •  metoprolol succinate XL (TOPROL-XL) 25 MG 24 hr tablet, Take 1 tablet by mouth Daily., Disp: 30 tablet, Rfl: 0  •  multivitamin with minerals tablet tablet, Take 1 tablet by mouth Daily., Disp: , Rfl:   •  nitroglycerin (NITROSTAT) 0.4 MG SL tablet, Place 1 tablet under the tongue Every 5 (Five) Minutes As Needed for Chest Pain (Only if SBP Greater Than 100). Take no more than 3 doses in 15 minutes., Disp: 30 tablet, Rfl: 12  •  polyethylene glycol (MIRALAX) 17 g packet, Take 17 g by mouth Daily As Needed (Use if senna-docusate is ineffective)., Disp: 30 each, Rfl: 0  •  sennosides-docusate (PERICOLACE) 8.6-50 MG per tablet, Take 2 tablets by mouth 2 (Two) Times a Day., Disp: 30 tablet, Rfl: 0    Current outpatient and discharge medications have been reconciled for the patient.  Reviewed by: Haris Estrada MD       No Known Allergies    Family History   Family history unknown: Yes       Past Surgical History:   Procedure Laterality Date   • CARDIAC CATHETERIZATION  12/08/2022   • CARDIAC  "CATHETERIZATION Left 12/8/2022    Procedure: Left Heart Cath and coronary angiogram;  Surgeon: Haris Estrada MD;  Location: [facility] CATH INVASIVE LOCATION;  Service: Cardiovascular;  Laterality: Left;   • CARDIAC CATHETERIZATION N/A 12/8/2022    Procedure: Percutaneous Coronary Intervention;  Surgeon: Haris Estrada MD;  Location: [facility] CATH INVASIVE LOCATION;  Service: Cardiovascular;  Laterality: N/A;   • CORONARY STENT PLACEMENT  12/07/2022    Status post PCI of left circumflex, the culprit vessel with IVUS guidance   • INTERVENTIONAL RADIOLOGY PROCEDURE N/A 12/8/2022    Procedure: Intravascular Ultrasound;  Surgeon: Haris Estrada MD;  Location: [facility] CATH INVASIVE LOCATION;  Service: Cardiovascular;  Laterality: N/A;       Past Medical History:   Diagnosis Date   • Diabetes (HCC)    • Essential hypertension    • Hyperlipidemia 03/05/2013   • Obstructive sleep apnea syndrome 05/02/2022       Family History   Family history unknown: Yes       Social History     Socioeconomic History   • Marital status:    Tobacco Use   • Smoking status: Never     Passive exposure: Never   • Smokeless tobacco: Never   Vaping Use   • Vaping Use: Never used   Substance and Sexual Activity   • Alcohol use: Not Currently   • Drug use: Never   • Sexual activity: Defer           ECG 12 Lead    Date/Time: 12/20/2022 3:17 PM  Performed by: Haris Estrada MD  Authorized by: Haris Estrada MD   Comparison: compared with previous ECG   Similar to previous ECG  Comments: ECG shows sinus rhythm, first-degree AV block, T wave inversions in the lateral leads.  Heart rate 64 bpm, NY interval 248 ms, QRS duration 98 ms,  ms.              Objective:       Physical Exam    /77   Pulse 63   Ht 193 cm (76\")   Wt 106 kg (233 lb 12.8 oz)   SpO2 97%   BMI 28.46 kg/m²   The patient is alert, oriented and in no distress.    Vital signs as noted above.    Head and neck revealed no carotid bruits or jugular venous distension.  No " thyromegaly or lymphadenopathy is present.    Lungs clear.  No wheezing.  Breath sounds are normal bilaterally.    Heart normal first and second heart sounds.  No murmur..  No pericardial rub is present.  No gallop is present.    Abdomen soft and nontender.  No organomegaly is present.    Extremities revealed good peripheral pulses without any pedal edema.    Skin warm and dry.    Musculoskeletal system is grossly normal.    CNS grossly normal.           Diagnosis Plan   1. NSTEMI (non-ST elevated myocardial infarction) ([facility])  ECG 12 Lead      2. Mixed hyperlipidemia        3. Essential hypertension        4. Coronary artery disease involving native coronary artery of native heart with unstable angina pectoris ([facility])        5. Type 2 diabetes mellitus without complication, without long-term current use of insulin ([facility])        6. Obstructive sleep apnea syndrome        7. Overweight (BMI 25.0-29.9)        8. Aortic root aneurysm        LAB RESULTS (LAST 7 DAYS)    CBC        BMP        CMP         BNP        TROPONIN        CoAg        Creatinine Clearance  Estimated Creatinine Clearance: 54.3 mL/min (A) (by C-G formula based on SCr of 1.45 mg/dL (H)).    ABG        Radiology  No radiology results for the last day        Assessment and Plan       Diagnoses and all orders for this visit:    1. NSTEMI (non-ST elevated myocardial infarction) ([facility]) (Primary)  -     ECG 12 Lead  Status post PCI to left circumflex artery.  3.5 x 23 mm Xience ruperto point stent at 14 [place].  This was postdilated with 4.5 x 15 mm noncompliant balloon at 16 [address].    Continue dual antiplatelet therapy.  Referral to cardiac rehab and resume workout and [employer].  Echocardiogram shows EF of 51 to 55%, grade 1 diastolic dysfunction      2. Mixed hyperlipidemia  Continue high intensity statin.  His LDL is 132, total cholesterol 201, HDL 36, triglyceride 183..   3. Essential hypertension  Blood pressures well controlled on lisinopril and metoprolol  4.  Coronary artery disease involving native coronary artery of native heart with unstable angina pectoris (HCC)  Continue risk factors modification  5. Type 2 diabetes mellitus without complication, without long-term current use of insulin ([facility])  Currently on glimepiride.   His HbA1c 6.9  6. Obstructive sleep apnea syndrome  Using CPAP  7. Overweight (BMI 25.0-29.9)  Discussed continuing exercise, diet and weight loss  8. Aortic root aneurysm  Aortic root is 4.3 cm  Beta-blocker therapy and blood pressure/heart rate control

## 2022-12-20 ENCOUNTER — OFFICE VISIT (OUTPATIENT)
Dept: CARDIOLOGY | Facility: CLINIC | Age: 82
End: 2022-12-20

## 2022-12-20 VITALS
SYSTOLIC BLOOD PRESSURE: 127 MMHG | BODY MASS INDEX: 28.47 KG/M2 | HEART RATE: 63 BPM | WEIGHT: 233.8 LBS | HEIGHT: 76 IN | OXYGEN SATURATION: 97 % | DIASTOLIC BLOOD PRESSURE: 77 MMHG

## 2022-12-20 DIAGNOSIS — E66.3 OVERWEIGHT (BMI 25.0-29.9): ICD-10-CM

## 2022-12-20 DIAGNOSIS — I21.4 NSTEMI (NON-ST ELEVATED MYOCARDIAL INFARCTION): Primary | ICD-10-CM

## 2022-12-20 DIAGNOSIS — I71.21 AORTIC ROOT ANEURYSM: ICD-10-CM

## 2022-12-20 DIAGNOSIS — I25.110 CORONARY ARTERY DISEASE INVOLVING NATIVE CORONARY ARTERY OF NATIVE HEART WITH UNSTABLE ANGINA PECTORIS: ICD-10-CM

## 2022-12-20 DIAGNOSIS — E11.9 TYPE 2 DIABETES MELLITUS WITHOUT COMPLICATION, WITHOUT LONG-TERM CURRENT USE OF INSULIN: Chronic | ICD-10-CM

## 2022-12-20 DIAGNOSIS — E78.2 MIXED HYPERLIPIDEMIA: Chronic | ICD-10-CM

## 2022-12-20 DIAGNOSIS — I10 ESSENTIAL HYPERTENSION: Chronic | ICD-10-CM

## 2022-12-20 DIAGNOSIS — G47.33 OBSTRUCTIVE SLEEP APNEA SYNDROME: Chronic | ICD-10-CM

## 2022-12-20 PROCEDURE — 93000 ELECTROCARDIOGRAM COMPLETE: CPT | Performed by: INTERNAL MEDICINE

## 2022-12-20 PROCEDURE — 99214 OFFICE O/P EST MOD 30 MIN: CPT | Performed by: INTERNAL MEDICINE

## 2022-12-20 RX ORDER — CLOTRIMAZOLE AND BETAMETHASONE DIPROPIONATE 10; .64 MG/G; MG/G
CREAM TOPICAL
COMMUNITY
Start: 2022-11-29

## 2023-02-10 ENCOUNTER — APPOINTMENT (OUTPATIENT)
Dept: CT IMAGING | Facility: HOSPITAL | Age: 83
End: 2023-02-10
Payer: MEDICARE

## 2023-02-10 ENCOUNTER — HOSPITAL ENCOUNTER (EMERGENCY)
Facility: HOSPITAL | Age: 83
Discharge: HOME | End: 2023-02-11
Attending: EMERGENCY MEDICINE
Payer: MEDICARE

## 2023-02-10 DIAGNOSIS — N28.9 RENAL DYSFUNCTION: ICD-10-CM

## 2023-02-10 DIAGNOSIS — I70.90 ATHEROSCLEROSIS: ICD-10-CM

## 2023-02-10 DIAGNOSIS — R93.89 ABNORMAL CT SCAN, NECK: ICD-10-CM

## 2023-02-10 DIAGNOSIS — R51.9 NONINTRACTABLE HEADACHE, UNSPECIFIED CHRONICITY PATTERN, UNSPECIFIED HEADACHE TYPE: Primary | ICD-10-CM

## 2023-02-10 LAB
BASOPHILS # BLD AUTO: 0.03 10*3/MM3 (ref 0–0.2)
BASOPHILS NFR BLD AUTO: 0.4 % (ref 0–1.5)
DEPRECATED RDW RBC AUTO: 47.9 FL (ref 37–54)
EOSINOPHIL # BLD AUTO: 0.14 10*3/MM3 (ref 0–0.4)
EOSINOPHIL NFR BLD AUTO: 2 % (ref 0.3–6.2)
ERYTHROCYTE [DISTWIDTH] IN BLOOD BY AUTOMATED COUNT: 14.3 % (ref 12.3–15.4)
GLUCOSE BLDC GLUCOMTR-MCNC: 90 MG/DL (ref 70–130)
HCT VFR BLD AUTO: 38.8 % (ref 37.5–51)
HGB BLD-MCNC: 13 G/DL (ref 13–17.7)
IMM GRANULOCYTES # BLD AUTO: 0.01 10*3/MM3 (ref 0–0.05)
IMM GRANULOCYTES NFR BLD AUTO: 0.1 % (ref 0–0.5)
LYMPHOCYTES # BLD AUTO: 1.46 10*3/MM3 (ref 0.7–3.1)
LYMPHOCYTES NFR BLD AUTO: 20.8 % (ref 19.6–45.3)
MCH RBC QN AUTO: 30.2 PG (ref 26.6–33)
MCHC RBC AUTO-ENTMCNC: 33.5 G/DL (ref 31.5–35.7)
MCV RBC AUTO: 90 FL (ref 79–97)
MONOCYTES # BLD AUTO: 0.95 10*3/MM3 (ref 0.1–0.9)
MONOCYTES NFR BLD AUTO: 13.6 % (ref 5–12)
NEUTROPHILS NFR BLD AUTO: 4.42 10*3/MM3 (ref 1.7–7)
NEUTROPHILS NFR BLD AUTO: 63.1 % (ref 42.7–76)
PLATELET # BLD AUTO: 213 10*3/MM3 (ref 140–450)
PMV BLD AUTO: 10.3 FL (ref 6–12)
RBC # BLD AUTO: 4.31 10*6/MM3 (ref 4.14–5.8)
WBC NRBC COR # BLD: 7.01 10*3/MM3 (ref 3.4–10.8)

## 2023-02-10 PROCEDURE — 80053 COMPREHEN METABOLIC PANEL: CPT | Performed by: EMERGENCY MEDICINE

## 2023-02-10 PROCEDURE — 70496 CT ANGIOGRAPHY HEAD: CPT

## 2023-02-10 PROCEDURE — 96361 HYDRATE IV INFUSION ADD-ON: CPT

## 2023-02-10 PROCEDURE — 85610 PROTHROMBIN TIME: CPT | Performed by: EMERGENCY MEDICINE

## 2023-02-10 PROCEDURE — 96360 HYDRATION IV INFUSION INIT: CPT

## 2023-02-10 PROCEDURE — 84484 ASSAY OF TROPONIN QUANT: CPT | Performed by: EMERGENCY MEDICINE

## 2023-02-10 PROCEDURE — 93005 ELECTROCARDIOGRAM TRACING: CPT | Performed by: EMERGENCY MEDICINE

## 2023-02-10 PROCEDURE — 82962 GLUCOSE BLOOD TEST: CPT

## 2023-02-10 PROCEDURE — 36415 COLL VENOUS BLD VENIPUNCTURE: CPT

## 2023-02-10 PROCEDURE — 85025 COMPLETE CBC W/AUTO DIFF WBC: CPT | Performed by: EMERGENCY MEDICINE

## 2023-02-10 PROCEDURE — 99284 EMERGENCY DEPT VISIT MOD MDM: CPT

## 2023-02-10 PROCEDURE — 85652 RBC SED RATE AUTOMATED: CPT | Performed by: EMERGENCY MEDICINE

## 2023-02-10 PROCEDURE — 70498 CT ANGIOGRAPHY NECK: CPT

## 2023-02-10 RX ORDER — SODIUM CHLORIDE 0.9 % (FLUSH) 0.9 %
10 SYRINGE (ML) INJECTION AS NEEDED
Status: DISCONTINUED | OUTPATIENT
Start: 2023-02-10 | End: 2023-02-11 | Stop reason: HOSPADM

## 2023-02-11 ENCOUNTER — APPOINTMENT (OUTPATIENT)
Dept: GENERAL RADIOLOGY | Facility: HOSPITAL | Age: 83
End: 2023-02-11
Payer: MEDICARE

## 2023-02-11 ENCOUNTER — APPOINTMENT (OUTPATIENT)
Dept: CT IMAGING | Facility: HOSPITAL | Age: 83
End: 2023-02-11
Payer: MEDICARE

## 2023-02-11 VITALS
HEART RATE: 64 BPM | OXYGEN SATURATION: 95 % | DIASTOLIC BLOOD PRESSURE: 77 MMHG | RESPIRATION RATE: 16 BRPM | TEMPERATURE: 98.5 F | SYSTOLIC BLOOD PRESSURE: 172 MMHG

## 2023-02-11 LAB
ALBUMIN SERPL-MCNC: 4.4 G/DL (ref 3.5–5.2)
ALBUMIN/GLOB SERPL: 1.6 G/DL
ALP SERPL-CCNC: 67 U/L (ref 39–117)
ALT SERPL W P-5'-P-CCNC: 18 U/L (ref 1–41)
ANION GAP SERPL CALCULATED.3IONS-SCNC: 10.4 MMOL/L (ref 5–15)
ANION GAP SERPL CALCULATED.3IONS-SCNC: 9.1 MMOL/L (ref 5–15)
AST SERPL-CCNC: 21 U/L (ref 1–40)
BILIRUB SERPL-MCNC: 0.5 MG/DL (ref 0–1.2)
BUN SERPL-MCNC: 31 MG/DL (ref 8–23)
BUN SERPL-MCNC: 34 MG/DL (ref 8–23)
BUN/CREAT SERPL: 18.3 (ref 7–25)
BUN/CREAT SERPL: 18.4 (ref 7–25)
CALCIUM SPEC-SCNC: 10.3 MG/DL (ref 8.6–10.5)
CALCIUM SPEC-SCNC: 9.5 MG/DL (ref 8.6–10.5)
CHLORIDE SERPL-SCNC: 100 MMOL/L (ref 98–107)
CHLORIDE SERPL-SCNC: 101 MMOL/L (ref 98–107)
CO2 SERPL-SCNC: 25.9 MMOL/L (ref 22–29)
CO2 SERPL-SCNC: 26.6 MMOL/L (ref 22–29)
CREAT SERPL-MCNC: 1.69 MG/DL (ref 0.76–1.27)
CREAT SERPL-MCNC: 1.85 MG/DL (ref 0.76–1.27)
EGFRCR SERPLBLD CKD-EPI 2021: 36.4 ML/MIN/1.73
EGFRCR SERPLBLD CKD-EPI 2021: 40.5 ML/MIN/1.73
ERYTHROCYTE [SEDIMENTATION RATE] IN BLOOD: 28 MM/HR (ref 0–20)
GEN 5 2HR TROPONIN T REFLEX: 45 NG/L
GLOBULIN UR ELPH-MCNC: 2.7 GM/DL
GLUCOSE SERPL-MCNC: 107 MG/DL (ref 65–99)
GLUCOSE SERPL-MCNC: 97 MG/DL (ref 65–99)
HOLD SPECIMEN: NORMAL
HOLD SPECIMEN: NORMAL
INR PPP: 1.06 (ref 0.93–1.1)
POTASSIUM SERPL-SCNC: 3.8 MMOL/L (ref 3.5–5.2)
POTASSIUM SERPL-SCNC: 4 MMOL/L (ref 3.5–5.2)
PROT SERPL-MCNC: 7.1 G/DL (ref 6–8.5)
PROTHROMBIN TIME: 10.9 SECONDS (ref 9.6–11.7)
QT INTERVAL: 419 MS
SODIUM SERPL-SCNC: 136 MMOL/L (ref 136–145)
SODIUM SERPL-SCNC: 137 MMOL/L (ref 136–145)
TROPONIN T DELTA: 2 NG/L
TROPONIN T SERPL HS-MCNC: 43 NG/L
WHOLE BLOOD HOLD COAG: NORMAL
WHOLE BLOOD HOLD SPECIMEN: NORMAL

## 2023-02-11 PROCEDURE — 70450 CT HEAD/BRAIN W/O DYE: CPT

## 2023-02-11 PROCEDURE — 99284 EMERGENCY DEPT VISIT MOD MDM: CPT | Performed by: EMERGENCY MEDICINE

## 2023-02-11 PROCEDURE — 84484 ASSAY OF TROPONIN QUANT: CPT | Performed by: EMERGENCY MEDICINE

## 2023-02-11 PROCEDURE — 96361 HYDRATE IV INFUSION ADD-ON: CPT

## 2023-02-11 PROCEDURE — 71045 X-RAY EXAM CHEST 1 VIEW: CPT

## 2023-02-11 PROCEDURE — 93010 ELECTROCARDIOGRAM REPORT: CPT | Performed by: EMERGENCY MEDICINE

## 2023-02-11 PROCEDURE — 80048 BASIC METABOLIC PNL TOTAL CA: CPT | Performed by: EMERGENCY MEDICINE

## 2023-02-11 PROCEDURE — 0 IOPAMIDOL PER 1 ML: Performed by: EMERGENCY MEDICINE

## 2023-02-11 PROCEDURE — 96360 HYDRATION IV INFUSION INIT: CPT

## 2023-02-11 RX ORDER — TIZANIDINE 2 MG/1
2 TABLET ORAL NIGHTLY PRN
Qty: 7 TABLET | Refills: 0 | Status: SHIPPED | OUTPATIENT
Start: 2023-02-11

## 2023-02-11 RX ADMIN — IOPAMIDOL 100 ML: 755 INJECTION, SOLUTION INTRAVENOUS at 00:05

## 2023-02-11 RX ADMIN — SODIUM CHLORIDE 1000 ML: 0.9 INJECTION, SOLUTION INTRAVENOUS at 00:54

## 2023-02-11 NOTE — DISCHARGE INSTRUCTIONS
In medicine there is always a level of diagnostic uncertainty. Even if it is low. For this reason, immediately return to the emergency department if you have any new symptoms, worsening symptoms, change of symptoms, or if you have any other concerns. We would be happy to re-evaluate you. Otherwise, please take your medications as prescribed and follow-up as recommended. This paperwork can be taken to your primary care provider to further discuss any non-emergent lab and/or imaging findings.    Skip metformin the next two days and drink plenty of water. Follow-up with your primary care provider to repeat kidney testing.

## 2023-02-11 NOTE — FSED PROVIDER NOTE
"[facility]    Pt Name: Wilfredo Mcnamara  MRN: [id number]  Birthdate 1940  Date of evaluation: 2/10/2023  Provider: Nelson Johnson MD     CHIEF CONCERN     Chief Complaint   Patient presents with   • Headache     Pt to ER for evaluation of headache, and feeling of numbness and tingling in rt side of face. No neuro deficits noted. Pt diabetic with hx of heart attack 2 months ago.       HISTORY OF PRESENT ILLNESS   Patient was activated as code stroke in triage for reported new onset tingling of the face.  Patient tells me for the past 6 months has had episodes of sharp pain in the right temporal area.  Seems to be superficial.  Does not seem to radiate.  He states it almost feels like \"pinching\" or a stabbing but not tingling or numb. Says symptoms seem to come almost daily but would primarily resolve by the end of the day.  Today he had several episodes lasting 10 to 15 minutes at a time.  When symptoms were not resolving he wanted further evaluation.  He denies any numbness or tingling.  He denies any difficulty speaking.  No difficulty swallowing.  No confusion.  Reports about 2 months ago he had a heart attack.  Says since then he has been feeling generally anxious and he \"Googles\" everything and wanted checked.    REVIEW OF SYSTEMS     Constitutional: No fevers. No chills. No fatigue.  HENT: No sore throat. No congestion.  Eye: No double vision. No blurry vision. No eye pain.  Respiratory: No cough. No dyspnea. No wheezing.  Cardiovascular: No chest pain. No palpitations. No lightheadedness. No leg swelling.  GI: No abdominal pain. No diarrhea. No constipation. No nausea. No vomiting.  : No frequency. No dysuria. No urgency.   MSK: No arthralgias. No myalgias.  Skin: No rashes.   Neuro: No weakness. No headache in traditional sense.   Psych: No suicidal ideation. No homicidal ideation.    PAST MEDICAL HISTORY     Past Medical History:   Diagnosis Date   • Diabetes (HCC)  "   • Essential hypertension    • Hyperlipidemia 03/05/2013   • Obstructive sleep apnea syndrome 05/02/2022       SURGICAL HISTORY       Past Surgical History:   Procedure Laterality Date   • CARDIAC CATHETERIZATION  12/08/2022   • CARDIAC CATHETERIZATION Left 12/8/2022    Procedure: Left Heart Cath and coronary angiogram;  Surgeon: Haris Estrada MD;  Location: [facility] CATH INVASIVE LOCATION;  Service: Cardiovascular;  Laterality: Left;   • CARDIAC CATHETERIZATION N/A 12/8/2022    Procedure: Percutaneous Coronary Intervention;  Surgeon: Haris Estrada MD;  Location: [facility] CATH INVASIVE LOCATION;  Service: Cardiovascular;  Laterality: N/A;   • CORONARY STENT PLACEMENT  12/07/2022    Status post PCI of left circumflex, the culprit vessel with IVUS guidance   • INTERVENTIONAL RADIOLOGY PROCEDURE N/A 12/8/2022    Procedure: Intravascular Ultrasound;  Surgeon: Haris Estrada MD;  Location: [facility] CATH INVASIVE LOCATION;  Service: Cardiovascular;  Laterality: N/A;       CURRENT MEDICATIONS          Medication List      START taking these medications    tiZANidine 2 MG tablet  Commonly known as: ZANAFLEX  Take 1 tablet by mouth At Night As Needed for Muscle Spasms.        CONTINUE taking these medications    ascorbic acid 500 MG tablet  Commonly known as: VITAMIN C     aspirin 81 MG EC tablet  Take 1 tablet by mouth Daily.     atorvastatin 40 MG tablet  Commonly known as: LIPITOR  Take 1 tablet by mouth Every Night.     clopidogrel 75 MG tablet  Commonly known as: PLAVIX  Take 1 tablet by mouth Daily.     clotrimazole-betamethasone 1-0.05 % cream  Commonly known as: LOTRISONE     gabapentin 300 MG capsule  Commonly known as: NEURONTIN     glimepiride 2 MG tablet  Commonly known as: AMARYL     lisinopril 5 MG tablet  Commonly known as: PRINIVIL,ZESTRIL  Take 1 tablet by mouth Daily.     metFORMIN 500 MG tablet  Commonly known as: GLUCOPHAGE     metoprolol succinate XL 25 MG 24 hr tablet  Commonly known as: TOPROL-XL  Take 1  tablet by mouth Daily.     multivitamin with minerals tablet tablet     nitroglycerin 0.4 MG SL tablet  Commonly known as: NITROSTAT  Place 1 tablet under the tongue Every 5 (Five) Minutes As Needed for Chest Pain (Only if SBP Greater Than 100). Take no more than 3 doses in 15 minutes.     polyethylene glycol 17 g packet  Commonly known as: MIRALAX  Take 17 g by mouth Daily As Needed (Use if senna-docusate is ineffective).     sennosides-docusate 8.6-50 MG per tablet  Commonly known as: PERICOLACE  Take 2 tablets by mouth 2 (Two) Times a Day.           Where to Get Your Medications      These medications were sent to [id number] - [place], [place] - [address] - [phone]  - [phone] FX  [address] [place] [address]    Phone: [phone]   · tiZANidine 2 MG tablet         ALLERGIES     Patient has no known allergies.    FAMILY HISTORY       Family History   Family history unknown: Yes        SOCIAL HISTORY       Social History     Socioeconomic History   • Marital status:    Tobacco Use   • Smoking status: Never     Passive exposure: Never   • Smokeless tobacco: Never   Vaping Use   • Vaping Use: Never used   Substance and Sexual Activity   • Alcohol use: Not Currently   • Drug use: Never   • Sexual activity: Defer       SCREENINGS           PHYSICAL EXAM      Vitals:    02/11/23 0007 02/11/23 0100 02/11/23 0221   BP: 170/83 168/76 172/77   BP Location:  Right arm    Patient Position:  Lying    Pulse: 72 64 64   Resp:  16    Temp:  98.5 °F (36.9 °C)    TempSrc:  Oral    SpO2: 95% 97% 95%       General: Nontoxic. Conversational. Appears stated age.  Skin: Warm. Dry. Intact. No systemic rashes or lesions.  No rashes or hyperesthesias to the face.  Eyes: Pupils are equally round and reactive to light. Extraocular motions are intact. Clear sclera. No gaze preference.  No photophobia.  HENT: Atraumatic. Normocephalic. Trachea midline. Mucosal membranes moist. Posterior  oropharynx without erythema or exudate.  No trismus or malocclusion.  Uvula midline. Tenderness right temporal region.   Lungs: Clear to auscultation throughout. Nonlabored breathing.  Cardiovascular: No murmurs, rubs, or gallops appreciated. No pedal edema. No JVD. Symmetric radial pulses. Symmetric dorsal pedis pulses.   Abdomen: Soft and nontender. Nondistended. Bowel sounds are present.  Musculoskeletal: No asymmetry. No deformities. No effusions.  Neuro: As below.  Psych: Appropriate. Cooperative.    NIH Stroke Scale/Score (NIHSS): 0    INPUTS:  1A: Level of consciousness --> 0 = Alert; keenly responsive  1B: Ask month and age --> 0 = Both questions right  1C: 'Blink eyes' & 'squeeze hands' --> 0 = Performs both tasks  2: Horizontal extraocular movements --> 0 = Normal  3: Visual fields --> 0 = No visual loss  4: Facial palsy --> 0 = Normal symmetry  5A: Left arm motor drift --> 0 = No drift for 10 seconds  5B: Right arm motor drift --> 0 = No drift for 10 seconds  6A: Left leg motor drift --> 0 = No drift for 5 seconds  6B: Right leg motor drift --> 0 = No drift for 5 seconds  7: Limb Ataxia --> 0 = No ataxia  8: Sensation --> 0 = Normal; no sensory loss  9: Language/aphasia --> 0 = Normal; no aphasia  10: Dysarthria --> 0 = Normal  11: Extinction/inattention --> 0 = No abnormality    REVIEWED DIAGNOSTIC RESULTS     RADIOLOGY   CT Angiogram Neck    Result Date: 2/11/2023  1. Mixed calcified and noncalcified atheromatous plaque at the aortic arch. Some of the soft plaque is irregular, with ulcerations and possible unstable plaque. 2. Mild narrowing at the right vertebral artery origin. Otherwise no occlusion or significant stenosis in the cervical carotid or vertebral arteries. Small vascular web suspected at the left carotid bulb. 3. Mild atherosclerosis of the intracranial internal carotid arteries. No large vessel occlusion or significant stenosis intracranially. 4. Cervical spine degenerative changes.  Electronically signed by:  Vega Garces M.D.  2/10/2023 10:39 PM Mountain Time    CT Head Without Contrast Stroke Protocol    Result Date: 2/11/2023  1. No acute intracranial abnormality is identified. 2. Generalized brain volume loss and small vessel ischemic changes. Atherosclerosis. Findings discussed with Dr. Johnson at 12:25 AM on 2/11/2023. Electronically signed by:  Vega Garces M.D.  2/10/2023 10:30 PM Mountain Time    CT Angiogram Head w AI Analysis of LVO    Result Date: 2/11/2023  1. Mixed calcified and noncalcified atheromatous plaque at the aortic arch. Some of the soft plaque is irregular, with ulcerations and possible unstable plaque. 2. Mild narrowing at the right vertebral artery origin. Otherwise no occlusion or significant stenosis in the cervical carotid or vertebral arteries. Small vascular web suspected at the left carotid bulb. 3. Mild atherosclerosis of the intracranial internal carotid arteries. No large vessel occlusion or significant stenosis intracranially. 4. Cervical spine degenerative changes. Electronically signed by:  Vega Garces M.D.  2/10/2023 10:39 PM Mountain Time        LABS:  Labs Reviewed   COMPREHENSIVE METABOLIC PANEL - Abnormal; Notable for the following components:       Result Value    Glucose 107 (*)     BUN 34 (*)     Creatinine 1.85 (*)     eGFR 36.4 (*)     All other components within normal limits    Narrative:     GFR Normal >60  Chronic Kidney Disease <60  Kidney Failure <15    The GFR formula is only valid for adults with stable renal function between ages 18 and 70.   TROPONIN - Abnormal; Notable for the following components:    HS Troponin T 43 (*)     All other components within normal limits    Narrative:     High Sensitive Troponin T Reference Range:  <10.0 ng/L- Negative Female for AMI  <15.0 ng/L- Negative Male for AMI  >=10 - Abnormal Female indicating possible myocardial injury.  >=15 - Abnormal Male indicating possible myocardial injury.    Clinicians would have to utilize clinical acumen, EKG, Troponin, and serial changes to determine if it is an Acute Myocardial Infarction or myocardial injury due to an underlying chronic condition.        SEDIMENTATION RATE - Abnormal; Notable for the following components:    Sed Rate 28 (*)     All other components within normal limits   CBC WITH AUTO DIFFERENTIAL - Abnormal; Notable for the following components:    Monocyte % 13.6 (*)     Monocytes, Absolute 0.95 (*)     All other components within normal limits   HIGH SENSITIVITIY TROPONIN T 2HR - Abnormal; Notable for the following components:    HS Troponin T 45 (*)     All other components within normal limits    Narrative:     High Sensitive Troponin T Reference Range:  <10.0 ng/L- Negative Female for AMI  <15.0 ng/L- Negative Male for AMI  >=10 - Abnormal Female indicating possible myocardial injury.  >=15 - Abnormal Male indicating possible myocardial injury.   Clinicians would have to utilize clinical acumen, EKG, Troponin, and serial changes to determine if it is an Acute Myocardial Infarction or myocardial injury due to an underlying chronic condition.        BASIC METABOLIC PANEL - Abnormal; Notable for the following components:    BUN 31 (*)     Creatinine 1.69 (*)     eGFR 40.5 (*)     All other components within normal limits    Narrative:     GFR Normal >60  Chronic Kidney Disease <60  Kidney Failure <15    The GFR formula is only valid for adults with stable renal function between ages 18 and 70.   PROTIME-INR - Normal   POCT GLUCOSE FINGERSTICK - Normal   RAINBOW DRAW    Narrative:     The following orders were created for panel order [place] Draw.  Procedure                               Abnormality         Status                     ---------                               -----------         ------                     Green Top (Gel)[[id number]]                                  Final result               Lavender Top[[id number]]                                      Final result               Gold Top - SST[[id number]]                                   Final result               Light Blue Top[[id number]]                                   Final result               Green Top (Gel)[[id number]]                                                               Please view results for these tests on the individual orders.   POCT GLUCOSE FINGERSTICK   POCT PROTIME - INR   CBC AND DIFFERENTIAL    Narrative:     The following orders were created for panel order CBC & Differential.  Procedure                               Abnormality         Status                     ---------                               -----------         ------                     CBC Auto Differential[[id number]]        Abnormal            Final result                 Please view results for these tests on the individual orders.   GREEN TOP   LAVENDER TOP   GOLD TOP - SST   LIGHT BLUE TOP       All other labs were within normal range or not returned as of this dictation.     EMERGENCY DEPARTMENT COURSE and DIFFERENTIAL DIAGNOSIS/MDM:     · Nursing notes were reviewed. Patient was evaluated. Orders placed as below.  · CODE stroke from triage. I do not believe this is indicated currently.   · Labs reviewed. Hs troponin elevated in setting of recent AMI and renal dysfunction. Repeat without significant change. Renal dysfunction improved with IV fluids. ESR mildly elevated but not suggestive of temporal arteritis.   · CXR independently reviewed and interpreted as not appreciated acute findings.  · Radiology report(s) reviewed.  · Patient re-evaluated. All test findings discussed. No new symptoms. Feels improved. Repeat exam benign.     ECG:  ECG interpreted by attending? Interpreted by ED physician  Rate: Normal  Rhythm: Normal  Intervals: Normal  S-T segments: No STEMI  Other: LVH, prolonged PA, PAC    Medications   sodium chloride 0.9 % flush 10 mL (has no administration in time range)   iopamidol  (ISOVUE-370) 76 % injection 100 mL (100 mL Intravenous Given 2/11/23 0005)   sodium chloride 0.9 % bolus 1,000 mL (1,000 mL Intravenous New Bag 2/11/23 0054)     Orders Placed This Encounter   Procedures   • CT Head Without Contrast Stroke Protocol   • CT Angiogram Head w AI Analysis of LVO   • XR Chest 1 View   • CT Angiogram Neck   • [place] Draw   • Comprehensive Metabolic Panel   • High Sensitivity Troponin T   • Sedimentation Rate   • CBC Auto Differential   • Protime-INR   • High Sensitivity Troponin T 2Hr   • Basic Metabolic Panel   • NPO Diet NPO Type: Strict NPO   • Initiate Department's Acute Stroke Process (Team D, Code 19, etc)   • Measure Weight   • Perform NIH Stroke Scale   • Measure Actual Weight   • Head of Bed 30 Degrees or Less   • Undress and Gown   • Cardiac Monitoring   • Continuous Pulse Oximetry   • Vital Signs   • Notify MD for SBP < 80 or > 200   • Notify Provider for SBP greater than 140 if hemorrhagic stroke   • No Hypotonic Fluids   • Oxygen Therapy- Nasal Cannula; 2 LPM; Titrate for SPO2: equal to or greater than, 94%   • POC Glucose Once   • POC Protime / INR   • POC Glucose Once   • ECG 12 Lead ED Triage Standing Order; Acute Stroke (Onset <24 hrs)   • Insert Large Bore Peripheral IV - Right AC Preferred   • CBC & Differential   • Green Top (Gel)   • Lavender Top   • Gold Top - SST   • Light Blue Top   • ED Acknowledgement Form Needed;       PROCEDURES (if any):  Procedures    FINAL IMPRESSION     1. Nonintractable headache, unspecified chronicity pattern, unspecified headache type    2. Renal dysfunction    3. Atherosclerosis    4. 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    I estimate a VERY LOW probability for acute life or limb-threatening illness. I discussed this with Wilfredo MALIK Mcnamara and discussed symptoms that would warrant return to the emergency department. I underscored the importance of following up as directed in the discharge instructions. Wilfredo Mcnamara understood and was agreeable. All questions were answered.      DISPOSITION/PLAN     ED Disposition     ED Disposition   Discharge    Condition   Stable    Comment   --             PATIENT REFERRED TO:  Nigel Tinoco MD  [address] [place] [address]  [address] [place] [address] [place] [facility]  [phone]    Schedule an appointment as soon as possible for a visit in 2 days  for repeat kidney function testing and re-evaluation      DISCHARGE MEDICATIONS:     Medication List      START taking these medications    tiZANidine 2 MG tablet  Commonly known as: ZANAFLEX  Take 1 tablet by mouth At Night As Needed for Muscle Spasms.        CONTINUE taking these medications    ascorbic acid 500 MG tablet  Commonly known as: VITAMIN C     aspirin 81 MG EC tablet  Take 1 tablet by mouth Daily.     atorvastatin 40 MG tablet  Commonly known as: LIPITOR  Take 1 tablet by mouth Every Night.     clopidogrel 75 MG tablet  Commonly known as: PLAVIX  Take 1 tablet by mouth Daily.     clotrimazole-betamethasone 1-0.05 % cream  Commonly known as: LOTRISONE     gabapentin 300 MG capsule  Commonly known as: NEURONTIN     glimepiride 2 MG tablet  Commonly known as: AMARYL     lisinopril 5 MG tablet  Commonly known as: PRINIVIL,ZESTRIL  Take 1 tablet by mouth Daily.     metFORMIN 500 MG tablet  Commonly known as: GLUCOPHAGE     metoprolol succinate XL 25 MG 24 hr tablet  Commonly known as: TOPROL-XL  Take 1 tablet by mouth Daily.     multivitamin with minerals tablet tablet     nitroglycerin  0.4 MG SL tablet  Commonly known as: NITROSTAT  Place 1 tablet under the tongue Every 5 (Five) Minutes As Needed for Chest Pain (Only if SBP Greater Than 100). Take no more than 3 doses in 15 minutes.     polyethylene glycol 17 g packet  Commonly known as: MIRALAX  Take 17 g by mouth Daily As Needed (Use if senna-docusate is ineffective).     sennosides-docusate 8.6-50 MG per tablet  Commonly known as: PERICOLACE  Take 2 tablets by mouth 2 (Two) Times a Day.           Where to Get Your Medications      These medications were sent to [id number] - [place], [place] - [address] - [phone]  - [phone] FX  [address] [place] [address]    Phone: [phone]   · tiZANidine 2 MG tablet

## 2023-04-04 ENCOUNTER — TELEPHONE (OUTPATIENT)
Dept: CARDIOLOGY | Facility: CLINIC | Age: 83
End: 2023-04-04
Payer: MEDICARE

## 2023-04-04 NOTE — TELEPHONE ENCOUNTER
LEFT PT VM TO CALL DR OLIVARES'S OFFICE TO RESCHEDULE APPT FOR 6/20/23 DUE TO DR OLIVARES HAVING A SCHEDULE CHANGE. PT ADVISED THERE ARE OPENINGS TO RESCHEDULE FOR THE SAME WEEK.

## 2023-05-12 ENCOUNTER — HOSPITAL ENCOUNTER (OUTPATIENT)
Facility: HOSPITAL | Age: 83
Discharge: HOME | End: 2023-05-12
Attending: EMERGENCY MEDICINE | Admitting: EMERGENCY MEDICINE
Payer: MEDICARE

## 2023-05-12 ENCOUNTER — APPOINTMENT (OUTPATIENT)
Dept: GENERAL RADIOLOGY | Facility: HOSPITAL | Age: 83
End: 2023-05-12
Payer: MEDICARE

## 2023-05-12 VITALS
DIASTOLIC BLOOD PRESSURE: 71 MMHG | RESPIRATION RATE: 18 BRPM | OXYGEN SATURATION: 97 % | BODY MASS INDEX: 28.01 KG/M2 | HEIGHT: 76 IN | TEMPERATURE: 98.4 F | HEART RATE: 72 BPM | SYSTOLIC BLOOD PRESSURE: 143 MMHG | WEIGHT: 230 LBS

## 2023-05-12 DIAGNOSIS — L03.90 CELLULITIS, UNSPECIFIED CELLULITIS SITE: Primary | ICD-10-CM

## 2023-05-12 PROCEDURE — 99213 OFFICE O/P EST LOW 20 MIN: CPT | Performed by: EMERGENCY MEDICINE

## 2023-05-12 PROCEDURE — 73630 X-RAY EXAM OF FOOT: CPT

## 2023-05-12 PROCEDURE — G0463 HOSPITAL OUTPT CLINIC VISIT: HCPCS | Performed by: EMERGENCY MEDICINE

## 2023-05-12 RX ORDER — CEPHALEXIN 500 MG/1
500 CAPSULE ORAL 4 TIMES DAILY
Qty: 40 CAPSULE | Refills: 0 | Status: SHIPPED | OUTPATIENT
Start: 2023-05-12 | End: 2023-05-15 | Stop reason: HOSPADM

## 2023-05-12 RX ORDER — IBUPROFEN 200 MG
1 TABLET ORAL 3 TIMES DAILY
Qty: 14.2 G | Refills: 0 | Status: SHIPPED | OUTPATIENT
Start: 2023-05-12 | End: 2023-05-14

## 2023-05-12 NOTE — FSED PROVIDER NOTE
Subjective   History of Present Illness  82-year-old male presents to the emergency room with left foot pain patient reports he was stepping out of a hot tub a few days ago any felt like he stepped on something but he has a history of neuropathy and did not feel anything until a few days later patient reports his wife felt like he had an area of skin loss as well as some induration denies any fevers or chills but does report a history of diabetes denies any history of cellulitis denies any fevers denies any chest pain shortness of breath ambulatory but limited secondary to pain now in ED for further        Review of Systems   Constitutional: Negative.    HENT: Negative.    Eyes: Negative.    Respiratory: Negative.    Cardiovascular: Negative.    Gastrointestinal: Negative.    Endocrine: Negative.    Genitourinary: Negative.    Skin: Positive for wound.   Allergic/Immunologic: Negative.    Neurological: Negative.    Hematological: Negative.    Psychiatric/Behavioral: Negative.        Past Medical History:   Diagnosis Date   • Diabetes    • Essential hypertension    • Hyperlipidemia 03/05/2013   • Obstructive sleep apnea syndrome 05/02/2022       No Known Allergies    Past Surgical History:   Procedure Laterality Date   • CARDIAC CATHETERIZATION  12/08/2022   • CARDIAC CATHETERIZATION Left 12/8/2022    Procedure: Left Heart Cath and coronary angiogram;  Surgeon: Haris Estrada MD;  Location: [facility] CATH INVASIVE LOCATION;  Service: Cardiovascular;  Laterality: Left;   • CARDIAC CATHETERIZATION N/A 12/8/2022    Procedure: Percutaneous Coronary Intervention;  Surgeon: Haris Estrada MD;  Location: [facility] INVASIVE LOCATION;  Service: Cardiovascular;  Laterality: N/A;   • CORONARY STENT PLACEMENT  12/07/2022    Status post PCI of left circumflex, the culprit vessel with IVUS guidance   • INTERVENTIONAL RADIOLOGY PROCEDURE N/A 12/8/2022    Procedure: Intravascular Ultrasound;  Surgeon: Haris Estrada MD;  Location:   [facility] CATH INVASIVE LOCATION;  Service: Cardiovascular;  Laterality: N/A;       Family History   Family history unknown: Yes       Social History     Socioeconomic History   • Marital status:    Tobacco Use   • Smoking status: Never     Passive exposure: Never   • Smokeless tobacco: Never   Vaping Use   • Vaping Use: Never used   Substance and Sexual Activity   • Alcohol use: Not Currently   • Drug use: Never   • Sexual activity: Defer           Objective   Physical Exam  Vitals and nursing note reviewed.   Constitutional:       Appearance: Normal appearance.   HENT:      Head: Normocephalic.      Right Ear: Tympanic membrane and ear canal normal.      Left Ear: Tympanic membrane and ear canal normal.      Nose: Nose normal.      Mouth/Throat:      Mouth: Mucous membranes are moist.      Pharynx: Oropharynx is clear.   Eyes:      Extraocular Movements: Extraocular movements intact.      Pupils: Pupils are equal, round, and reactive to light.   Cardiovascular:      Rate and Rhythm: Normal rate and regular rhythm.      Pulses: Normal pulses.      Heart sounds: Normal heart sounds.   Pulmonary:      Effort: Pulmonary effort is normal.      Breath sounds: Normal breath sounds.   Abdominal:      General: Abdomen is flat.      Palpations: Abdomen is soft.   Musculoskeletal:         General: Swelling and tenderness present. Normal range of motion.      Cervical back: Normal range of motion and neck supple.        Feet:    Feet:      Comments: Patient has a left foot induration erythema and tenderness to palpation at the dorsum aspect of the foot  Skin:     General: Skin is warm and dry.      Capillary Refill: Capillary refill takes less than 2 seconds.   Neurological:      General: No focal deficit present.      Mental Status: He is alert and oriented to person, place, and time.   Psychiatric:         Mood and Affect: Mood normal.         Behavior: Behavior normal.         Procedures           ED Course                                            Medical Decision Making  On exam there is concerning findings for acute cellulitis patient be started on Keflex recommended triple antibiotic ointment recommend podiatry follow-up as well as wound care clinic follow-up as needed patient be discharged home at this time x-ray findings showed no evidence of osteo    Amount and/or Complexity of Data Reviewed  Radiology:      Details:   XR Foot 3+ View Left (Final result)  Result time 05/12/23 12:33:40  Final result             Impression:     Impression:   No acute left foot findings. No plain film evidence of osteomyelitis. Mild degenerative changes.       Electronically Signed: Lexie Keen    5/12/2023 12:33 PM EDT    Workstation ID: [id number]        Narrative:     XR FOOT 3+ VW LEFT     Date of Exam: 5/12/2023 12:10 PM EDT     Indication: foot wound     Comparison: None available.     Findings:   No left foot fracture or joint malalignment is seen. There is mild narrowing of the interphalangeal joints and first MTP joint. There is mild spurring at the base of the fifth metatarsal. There is minor spurring at the dorsum of the midfoot and hindfoot.    Tibiotalar alignment is normal. Surrounding soft tissues are within normal limits.   The site of the heel wound is not well identified on this examination. There is no plain film evidence of osteomyelitis.                        Final diagnoses:   Cellulitis, unspecified cellulitis site       ED Disposition  ED Disposition     ED Disposition   Discharge    Condition   Stable    Comment   --             Nigel Tinoco MD  [address]  [address]  [place] [address]  [phone]          ELFEGO Pond DPM  [address]  [place] [address]  [phone]          [facility]  [address], [place] [address] · ~15.6 mi [phone]             Medication List      New Prescriptions    cephalexin 500 MG capsule  Commonly known  as: KEFLEX  Take 1 capsule by mouth 4 (Four) Times a Day for 10 days.     neomycin-bacitracin-polymyxin 5-400-5000 ointment  Apply 1 application topically to the appropriate area as directed 3 (Three) Times a Day.           Where to Get Your Medications      These medications were sent to [id number] - [place], [place] - [address] - [phone]  - [phone] FX  [address] [place] [address]    Phone: [phone]   · cephalexin 500 MG capsule  · neomycin-bacitracin-polymyxin 5-400-5000 ointment

## 2023-05-14 ENCOUNTER — HOSPITAL ENCOUNTER (INPATIENT)
Facility: HOSPITAL | Age: 83
LOS: 1 days | Discharge: HOME | End: 2023-05-15
Attending: EMERGENCY MEDICINE | Admitting: INTERNAL MEDICINE
Payer: MEDICARE

## 2023-05-14 ENCOUNTER — APPOINTMENT (OUTPATIENT)
Dept: CT IMAGING | Facility: HOSPITAL | Age: 83
End: 2023-05-14
Payer: MEDICARE

## 2023-05-14 DIAGNOSIS — E11.8 DIABETIC FOOT: ICD-10-CM

## 2023-05-14 DIAGNOSIS — L03.90 CELLULITIS, UNSPECIFIED CELLULITIS SITE: Primary | ICD-10-CM

## 2023-05-14 LAB
ALBUMIN SERPL-MCNC: 4 G/DL (ref 3.5–5.2)
ALBUMIN/GLOB SERPL: 1.3 G/DL
ALP SERPL-CCNC: 55 U/L (ref 39–117)
ALT SERPL W P-5'-P-CCNC: 14 U/L (ref 1–41)
ANION GAP SERPL CALCULATED.3IONS-SCNC: 10.5 MMOL/L (ref 5–15)
AST SERPL-CCNC: 14 U/L (ref 1–40)
BASOPHILS # BLD AUTO: 0.03 10*3/MM3 (ref 0–0.2)
BASOPHILS NFR BLD AUTO: 0.3 % (ref 0–1.5)
BILIRUB SERPL-MCNC: 1.1 MG/DL (ref 0–1.2)
BUN SERPL-MCNC: 33 MG/DL (ref 8–23)
BUN/CREAT SERPL: 21.4 (ref 7–25)
CALCIUM SPEC-SCNC: 9.5 MG/DL (ref 8.6–10.5)
CHLORIDE SERPL-SCNC: 101 MMOL/L (ref 98–107)
CO2 SERPL-SCNC: 26.5 MMOL/L (ref 22–29)
CREAT SERPL-MCNC: 1.54 MG/DL (ref 0.76–1.27)
CRP SERPL-MCNC: 9.61 MG/DL (ref 0–0.5)
D-LACTATE SERPL-SCNC: 1.3 MMOL/L (ref 0.5–2)
DEPRECATED RDW RBC AUTO: 45.1 FL (ref 37–54)
EGFRCR SERPLBLD CKD-EPI 2021: 45.3 ML/MIN/1.73
EOSINOPHIL # BLD AUTO: 0.18 10*3/MM3 (ref 0–0.4)
EOSINOPHIL NFR BLD AUTO: 1.8 % (ref 0.3–6.2)
ERYTHROCYTE [DISTWIDTH] IN BLOOD BY AUTOMATED COUNT: 13 % (ref 12.3–15.4)
ERYTHROCYTE [SEDIMENTATION RATE] IN BLOOD: 21 MM/HR (ref 0–20)
FLUAV SUBTYP SPEC NAA+PROBE: NOT DETECTED
FLUBV RNA ISLT QL NAA+PROBE: NOT DETECTED
GLOBULIN UR ELPH-MCNC: 3 GM/DL
GLUCOSE SERPL-MCNC: 160 MG/DL (ref 65–99)
HCT VFR BLD AUTO: 37.5 % (ref 37.5–51)
HGB BLD-MCNC: 12.5 G/DL (ref 13–17.7)
IMM GRANULOCYTES # BLD AUTO: 0.03 10*3/MM3 (ref 0–0.05)
IMM GRANULOCYTES NFR BLD AUTO: 0.3 % (ref 0–0.5)
LYMPHOCYTES # BLD AUTO: 0.9 10*3/MM3 (ref 0.7–3.1)
LYMPHOCYTES NFR BLD AUTO: 9 % (ref 19.6–45.3)
MCH RBC QN AUTO: 31.1 PG (ref 26.6–33)
MCHC RBC AUTO-ENTMCNC: 33.3 G/DL (ref 31.5–35.7)
MCV RBC AUTO: 93.3 FL (ref 79–97)
MONOCYTES # BLD AUTO: 1.32 10*3/MM3 (ref 0.1–0.9)
MONOCYTES NFR BLD AUTO: 13.1 % (ref 5–12)
MRSA DNA SPEC QL NAA+PROBE: ABNORMAL
NEUTROPHILS NFR BLD AUTO: 7.58 10*3/MM3 (ref 1.7–7)
NEUTROPHILS NFR BLD AUTO: 75.5 % (ref 42.7–76)
PLATELET # BLD AUTO: 189 10*3/MM3 (ref 140–450)
PMV BLD AUTO: 10.3 FL (ref 6–12)
POTASSIUM SERPL-SCNC: 4.5 MMOL/L (ref 3.5–5.2)
PROT SERPL-MCNC: 7 G/DL (ref 6–8.5)
RBC # BLD AUTO: 4.02 10*6/MM3 (ref 4.14–5.8)
SARS-COV-2 RNA RESP QL NAA+PROBE: NOT DETECTED
SODIUM SERPL-SCNC: 138 MMOL/L (ref 136–145)
WBC NRBC COR # BLD: 10.04 10*3/MM3 (ref 3.4–10.8)

## 2023-05-14 PROCEDURE — 85652 RBC SED RATE AUTOMATED: CPT | Performed by: EMERGENCY MEDICINE

## 2023-05-14 PROCEDURE — 80053 COMPREHEN METABOLIC PANEL: CPT | Performed by: EMERGENCY MEDICINE

## 2023-05-14 PROCEDURE — 86140 C-REACTIVE PROTEIN: CPT | Performed by: EMERGENCY MEDICINE

## 2023-05-14 PROCEDURE — 87070 CULTURE OTHR SPECIMN AEROBIC: CPT | Performed by: STUDENT IN AN ORGANIZED HEALTH CARE EDUCATION/TRAINING PROGRAM

## 2023-05-14 PROCEDURE — 25010000002 VANCOMYCIN HCL IN NACL 2-0.9 GM/500ML-% SOLUTION: Performed by: EMERGENCY MEDICINE

## 2023-05-14 PROCEDURE — 73700 CT LOWER EXTREMITY W/O DYE: CPT

## 2023-05-14 PROCEDURE — 36415 COLL VENOUS BLD VENIPUNCTURE: CPT

## 2023-05-14 PROCEDURE — 25010000002 HEPARIN (PORCINE) PER 1000 UNITS: Performed by: STUDENT IN AN ORGANIZED HEALTH CARE EDUCATION/TRAINING PROGRAM

## 2023-05-14 PROCEDURE — 99285 EMERGENCY DEPT VISIT HI MDM: CPT

## 2023-05-14 PROCEDURE — 25010000002 PIPERACILLIN SOD-TAZOBACTAM PER 1 G: Performed by: EMERGENCY MEDICINE

## 2023-05-14 PROCEDURE — 87040 BLOOD CULTURE FOR BACTERIA: CPT | Performed by: EMERGENCY MEDICINE

## 2023-05-14 PROCEDURE — 86140 C-REACTIVE PROTEIN: CPT | Performed by: STUDENT IN AN ORGANIZED HEALTH CARE EDUCATION/TRAINING PROGRAM

## 2023-05-14 PROCEDURE — 0 CEFEPIME PER 500 MG: Performed by: EMERGENCY MEDICINE

## 2023-05-14 PROCEDURE — 87641 MR-STAPH DNA AMP PROBE: CPT | Performed by: EMERGENCY MEDICINE

## 2023-05-14 PROCEDURE — 87205 SMEAR GRAM STAIN: CPT | Performed by: STUDENT IN AN ORGANIZED HEALTH CARE EDUCATION/TRAINING PROGRAM

## 2023-05-14 PROCEDURE — 87636 SARSCOV2 & INF A&B AMP PRB: CPT | Performed by: EMERGENCY MEDICINE

## 2023-05-14 PROCEDURE — 85652 RBC SED RATE AUTOMATED: CPT | Performed by: STUDENT IN AN ORGANIZED HEALTH CARE EDUCATION/TRAINING PROGRAM

## 2023-05-14 PROCEDURE — 85025 COMPLETE CBC W/AUTO DIFF WBC: CPT | Performed by: EMERGENCY MEDICINE

## 2023-05-14 PROCEDURE — 85027 COMPLETE CBC AUTOMATED: CPT | Performed by: STUDENT IN AN ORGANIZED HEALTH CARE EDUCATION/TRAINING PROGRAM

## 2023-05-14 PROCEDURE — 83605 ASSAY OF LACTIC ACID: CPT | Performed by: EMERGENCY MEDICINE

## 2023-05-14 PROCEDURE — 80202 ASSAY OF VANCOMYCIN: CPT | Performed by: STUDENT IN AN ORGANIZED HEALTH CARE EDUCATION/TRAINING PROGRAM

## 2023-05-14 RX ORDER — ONDANSETRON 4 MG/1
4 TABLET, FILM COATED ORAL EVERY 6 HOURS PRN
Status: DISCONTINUED | OUTPATIENT
Start: 2023-05-14 | End: 2023-05-15 | Stop reason: HOSPADM

## 2023-05-14 RX ORDER — SODIUM CHLORIDE 0.9 % (FLUSH) 0.9 %
10 SYRINGE (ML) INJECTION EVERY 12 HOURS SCHEDULED
Status: DISCONTINUED | OUTPATIENT
Start: 2023-05-14 | End: 2023-05-15 | Stop reason: HOSPADM

## 2023-05-14 RX ORDER — IBUPROFEN 600 MG/1
1 TABLET ORAL
Status: DISCONTINUED | OUTPATIENT
Start: 2023-05-14 | End: 2023-05-15 | Stop reason: HOSPADM

## 2023-05-14 RX ORDER — HEPARIN SODIUM 5000 [USP'U]/ML
5000 INJECTION, SOLUTION INTRAVENOUS; SUBCUTANEOUS EVERY 8 HOURS SCHEDULED
Status: DISCONTINUED | OUTPATIENT
Start: 2023-05-14 | End: 2023-05-15 | Stop reason: HOSPADM

## 2023-05-14 RX ORDER — NICOTINE POLACRILEX 4 MG
15 LOZENGE BUCCAL
Status: DISCONTINUED | OUTPATIENT
Start: 2023-05-14 | End: 2023-05-15 | Stop reason: HOSPADM

## 2023-05-14 RX ORDER — BLOOD-GLUCOSE METER
1 KIT MISCELLANEOUS DAILY
COMMUNITY
Start: 2023-05-08

## 2023-05-14 RX ORDER — CLOPIDOGREL BISULFATE 75 MG/1
75 TABLET ORAL DAILY
Status: DISCONTINUED | OUTPATIENT
Start: 2023-05-15 | End: 2023-05-15 | Stop reason: HOSPADM

## 2023-05-14 RX ORDER — LOSARTAN POTASSIUM 25 MG/1
1 TABLET ORAL DAILY
COMMUNITY
Start: 2023-05-11

## 2023-05-14 RX ORDER — VANCOMYCIN 2 GRAM/500 ML IN 0.9 % SODIUM CHLORIDE INTRAVENOUS
20 ONCE
Status: COMPLETED | OUTPATIENT
Start: 2023-05-14 | End: 2023-05-14

## 2023-05-14 RX ORDER — LANOLIN ALCOHOL/MO/W.PET/CERES
2500 CREAM (GRAM) TOPICAL DAILY
COMMUNITY

## 2023-05-14 RX ORDER — SODIUM CHLORIDE 0.9 % (FLUSH) 0.9 %
10 SYRINGE (ML) INJECTION AS NEEDED
Status: DISCONTINUED | OUTPATIENT
Start: 2023-05-14 | End: 2023-05-15 | Stop reason: HOSPADM

## 2023-05-14 RX ORDER — ATORVASTATIN CALCIUM 40 MG/1
40 TABLET, FILM COATED ORAL NIGHTLY
Status: DISCONTINUED | OUTPATIENT
Start: 2023-05-14 | End: 2023-05-15 | Stop reason: HOSPADM

## 2023-05-14 RX ORDER — DEXTROSE MONOHYDRATE 25 G/50ML
25 INJECTION, SOLUTION INTRAVENOUS
Status: DISCONTINUED | OUTPATIENT
Start: 2023-05-14 | End: 2023-05-15 | Stop reason: HOSPADM

## 2023-05-14 RX ORDER — SODIUM CHLORIDE 9 MG/ML
125 INJECTION, SOLUTION INTRAVENOUS CONTINUOUS
Status: DISCONTINUED | OUTPATIENT
Start: 2023-05-14 | End: 2023-05-14

## 2023-05-14 RX ORDER — ONDANSETRON 2 MG/ML
4 INJECTION INTRAMUSCULAR; INTRAVENOUS EVERY 6 HOURS PRN
Status: DISCONTINUED | OUTPATIENT
Start: 2023-05-14 | End: 2023-05-15 | Stop reason: HOSPADM

## 2023-05-14 RX ORDER — INSULIN LISPRO 100 [IU]/ML
2-9 INJECTION, SOLUTION INTRAVENOUS; SUBCUTANEOUS
Status: DISCONTINUED | OUTPATIENT
Start: 2023-05-15 | End: 2023-05-15 | Stop reason: HOSPADM

## 2023-05-14 RX ORDER — GABAPENTIN 300 MG/1
600 CAPSULE ORAL 2 TIMES DAILY
Status: DISCONTINUED | OUTPATIENT
Start: 2023-05-14 | End: 2023-05-15 | Stop reason: HOSPADM

## 2023-05-14 RX ORDER — SODIUM CHLORIDE 9 MG/ML
40 INJECTION, SOLUTION INTRAVENOUS AS NEEDED
Status: DISCONTINUED | OUTPATIENT
Start: 2023-05-14 | End: 2023-05-15 | Stop reason: HOSPADM

## 2023-05-14 RX ORDER — NITROGLYCERIN 0.4 MG/1
0.4 TABLET SUBLINGUAL
Status: DISCONTINUED | OUTPATIENT
Start: 2023-05-14 | End: 2023-05-15 | Stop reason: HOSPADM

## 2023-05-14 RX ADMIN — ATORVASTATIN CALCIUM 40 MG: 40 TABLET, FILM COATED ORAL at 23:38

## 2023-05-14 RX ADMIN — SODIUM CHLORIDE 1000 ML: 9 INJECTION, SOLUTION INTRAVENOUS at 07:44

## 2023-05-14 RX ADMIN — HEPARIN SODIUM 5000 UNITS: 5000 INJECTION INTRAVENOUS; SUBCUTANEOUS at 23:38

## 2023-05-14 RX ADMIN — Medication 2000 MG: at 08:15

## 2023-05-14 RX ADMIN — PIPERACILLIN AND TAZOBACTAM 4.5 G: 4; .5 INJECTION, POWDER, LYOPHILIZED, FOR SOLUTION INTRAVENOUS at 07:43

## 2023-05-14 RX ADMIN — Medication 10 ML: at 23:38

## 2023-05-14 RX ADMIN — SODIUM CHLORIDE 125 ML/HR: 0.9 INJECTION, SOLUTION INTRAVENOUS at 10:40

## 2023-05-14 RX ADMIN — CEFEPIME 2 G: 2 INJECTION, POWDER, FOR SOLUTION INTRAVENOUS at 17:15

## 2023-05-14 RX ADMIN — GABAPENTIN 600 MG: 300 CAPSULE ORAL at 23:38

## 2023-05-14 NOTE — H&P
[facility] Medicine Services      Patient Name: Wilfredo Mcnamara  : 1940  MRN: [id number]  Primary Care Physician:  Nigel Tinoco MD  Date of admission: 2023      Subjective      Chief Complaint: Left foot pain    History of Present Illness: Wilfrdeo Mcnamara is a 82 y.o. male with PMHx of HTN, HLD, ANNE, DM who presented to [facility] on 2023 complaining of left foot pain.  Patient states he stepped on a plastic toy with left foot stepping out of hot tub two days ago and had small puncture wound and erythema of left lateral foot.  Failed otpt abx from .  Due to worsening swelling and erythema of left foot he presented back to .     At , vitals 98F, HR 59, RR 16, /65, 95 RA.  Labs notable for glucose 160, Cr 1.54, CRP 9.61, hgb 12.5, ESR 21.  CT LLE shows mild soft tissue swelling of lateral ankle and foot without underlying organized fluid collection. No acute osseous abnormalities.  Sent to [facility] for IV abx      ROS   12 point ROS reviewed and negative except as mentioned above      Personal History     Past Medical History:   Diagnosis Date   • Diabetes    • Essential hypertension    • Hyperlipidemia 2013   • Obstructive sleep apnea syndrome 2022       Past Surgical History:   Procedure Laterality Date   • CARDIAC CATHETERIZATION  2022   • CARDIAC CATHETERIZATION Left 2022    Procedure: Left Heart Cath and coronary angiogram;  Surgeon: Haris Estrada MD;  Location: [facility] CATH INVASIVE LOCATION;  Service: Cardiovascular;  Laterality: Left;   • CARDIAC CATHETERIZATION N/A 2022    Procedure: Percutaneous Coronary Intervention;  Surgeon: Haris Estrada MD;  Location: [facility] CATH INVASIVE LOCATION;  Service: Cardiovascular;  Laterality: N/A;   • CORONARY STENT PLACEMENT  2022    Status post PCI of left circumflex, the culprit vessel with IVUS guidance   • INTERVENTIONAL RADIOLOGY PROCEDURE N/A 2022    Procedure:  Intravascular Ultrasound;  Surgeon: Haris Estrada MD;  Location: [facility] INVASIVE LOCATION;  Service: Cardiovascular;  Laterality: N/A;       Family History: Family history is unknown by patient. Otherwise pertinent FHx was reviewed and not pertinent to current issue.    Social History:  reports that he has never smoked. He has never been exposed to tobacco smoke. He has never used smokeless tobacco. He reports that he does not currently use alcohol. He reports that he does not use drugs.    Home Medications:  Prior to Admission Medications     Prescriptions Last Dose Informant Patient Reported? Taking?    ascorbic acid (VITAMIN C) 500 MG tablet   Yes No    Take 500 mg by mouth Daily.    aspirin 81 MG EC tablet   No No    Take 1 tablet by mouth Daily.    atorvastatin (LIPITOR) 40 MG tablet   No No    Take 1 tablet by mouth Every Night.    cephalexin (KEFLEX) 500 MG capsule   No No    Take 1 capsule by mouth 4 (Four) Times a Day for 10 days.    CHOLECALCIFEROL PO   Yes No    Take 2,000 Units by mouth Daily.    clopidogrel (PLAVIX) 75 MG tablet   No No    Take 1 tablet by mouth Daily.    clotrimazole-betamethasone (LOTRISONE) 1-0.05 % cream   Yes No    Apply to affected areas on legs BID x 4 weeks    FREESTYLE LITE test strip   Yes No    1 each by Other route Daily. use to test blood sugar once daily    gabapentin (NEURONTIN) 300 MG capsule   Yes No    Take 600 mg by mouth 2 (Two) Times a Day.    glimepiride (AMARYL) 2 MG tablet   Yes No    Take 2 mg by mouth Daily.    losartan (COZAAR) 25 MG tablet   Yes No    Take 1 tablet by mouth Daily.    metFORMIN (GLUCOPHAGE) 500 MG tablet   Yes No    Take 1,000 mg by mouth 2 (Two) Times a Day With Meals.    multivitamin with minerals tablet tablet   Yes No    Take 1 tablet by mouth Daily.    mupirocin (BACTROBAN) 2 % ointment   Yes No    APPLY TOPICALLY TO WOUNDS TWICE DAILY    nitroglycerin (NITROSTAT) 0.4 MG SL tablet   No No    Place 1 tablet under the tongue Every 5  (Five) Minutes As Needed for Chest Pain (Only if SBP Greater Than 100). Take no more than 3 doses in 15 minutes.    vitamin B-12 (CYANOCOBALAMIN) 1000 MCG tablet   Yes No    Take 2.5 tablets by mouth Daily.            Allergies:  No Known Allergies    Objective      Vitals:   Temp:  [98.3 °F (36.8 °C)] 98.3 °F (36.8 °C)  Heart Rate:  [59-74] 71  Resp:  [16-18] 18  BP: (127-166)/(58-77) 150/77    Physical Exam  Constitutional:       General: He is not in acute distress.     Appearance: Normal appearance.   HENT:      Head: Normocephalic and atraumatic.      Nose: Nose normal.      Mouth/Throat:      Pharynx: Oropharynx is clear.   Eyes:      General: No scleral icterus.  Cardiovascular:      Rate and Rhythm: Normal rate and regular rhythm.      Heart sounds: No murmur heard.    No friction rub. No gallop.   Pulmonary:      Effort: No respiratory distress.      Breath sounds: No wheezing or rales.   Abdominal:      General: There is no distension.      Tenderness: There is no abdominal tenderness. There is no guarding.   Musculoskeletal:         General: Tenderness present. No swelling or deformity.      Cervical back: Normal range of motion. No rigidity.      Right lower leg: No edema.      Left lower leg: No edema.   Skin:     Comments: Left lateral foot erythema, tenderness, and swelling. No acute ulcers   Neurological:      General: No focal deficit present.      Mental Status: He is alert and oriented to person, place, and time.      Motor: No weakness.   Psychiatric:         Mood and Affect: Mood normal.         Behavior: Behavior normal.         Thought Content: Thought content normal.         Judgment: Judgment normal.          Result Review    Result Review:  I have personally reviewed the results from the time of this admission to 5/14/2023 18:53 EDT and agree with these findings:  [x]  Laboratory  []  Microbiology  [x]  Radiology  []  EKG/Telemetry   []  Cardiology/Vascular   []  Pathology  []  Old  records  []  Other:        Assessment & Plan        Active Hospital Problems:  Active Hospital Problems    Diagnosis    • **Cellulitis, unspecified cellulitis site      Plan:   #Left foot cellulitis    - CT LLE shows mild soft tissue swelling of lateral ankle and foot without underlying organized fluid collection. 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    I discussed the patient's findings and my recommendations with patient.    This patient has been examined wearing appropriate Personal Protective Equipment and discussed with PAtient. 05/14/23      Signature: Electronically signed by Selvin Buckner DO, 05/15/23, 12:50 AM EDT.

## 2023-05-14 NOTE — FSED PROVIDER NOTE
Subjective   History of Present Illness  82-year-old male presents to the emergency room with failed outpatient therapy from cellulitis patient reports she stepped on a plastic house cover after stepping out of a hot tub 2 days prior he was seen here on Friday was started on antibiotics however reports despite taking antibiotics his wound is gotten worse patient is localized swelling erythema and tenderness patient is diabetic        Review of Systems   Constitutional: Negative.    HENT: Negative.    Eyes: Negative.    Respiratory: Negative.    Cardiovascular: Negative.    Gastrointestinal: Negative.    Endocrine: Negative.    Genitourinary: Negative.    Skin: Positive for rash and wound.   Allergic/Immunologic: Negative.    Neurological: Negative.    Hematological: Negative.    Psychiatric/Behavioral: Negative.        Past Medical History:   Diagnosis Date   • Diabetes    • Essential hypertension    • Hyperlipidemia 03/05/2013   • Obstructive sleep apnea syndrome 05/02/2022       No Known Allergies    Past Surgical History:   Procedure Laterality Date   • CARDIAC CATHETERIZATION  12/08/2022   • CARDIAC CATHETERIZATION Left 12/8/2022    Procedure: Left Heart Cath and coronary angiogram;  Surgeon: Haris Estrada MD;  Location: [facility] CATH INVASIVE LOCATION;  Service: Cardiovascular;  Laterality: Left;   • CARDIAC CATHETERIZATION N/A 12/8/2022    Procedure: Percutaneous Coronary Intervention;  Surgeon: Haris Estrada MD;  Location: [facility] CATH INVASIVE LOCATION;  Service: Cardiovascular;  Laterality: N/A;   • CORONARY STENT PLACEMENT  12/07/2022    Status post PCI of left circumflex, the culprit vessel with IVUS guidance   • INTERVENTIONAL RADIOLOGY PROCEDURE N/A 12/8/2022    Procedure: Intravascular Ultrasound;  Surgeon: Haris Estrada MD;  Location: [facility] CATH INVASIVE LOCATION;  Service: Cardiovascular;  Laterality: N/A;       Family History   Family history unknown: Yes       Social History     Socioeconomic History    • Marital status:    Tobacco Use   • Smoking status: Never     Passive exposure: Never   • Smokeless tobacco: Never   Vaping Use   • Vaping Use: Never used   Substance and Sexual Activity   • Alcohol use: Not Currently   • Drug use: Never   • Sexual activity: Defer           Objective   Physical Exam  Vitals and nursing note reviewed.   Constitutional:       Appearance: Normal appearance.   HENT:      Head: Normocephalic.      Right Ear: Tympanic membrane and ear canal normal.      Left Ear: Tympanic membrane and ear canal normal.      Nose: Nose normal.      Mouth/Throat:      Mouth: Mucous membranes are moist.      Pharynx: Oropharynx is clear.   Eyes:      Extraocular Movements: Extraocular movements intact.      Pupils: Pupils are equal, round, and reactive to light.   Cardiovascular:      Rate and Rhythm: Normal rate and regular rhythm.      Pulses: Normal pulses.      Heart sounds: Normal heart sounds.   Pulmonary:      Effort: Pulmonary effort is normal.      Breath sounds: Normal breath sounds.   Abdominal:      General: Abdomen is flat.      Palpations: Abdomen is soft.   Musculoskeletal:         General: Swelling and tenderness present.      Cervical back: Normal range of motion and neck supple.      Left foot: Swelling and tenderness present.      Comments: Patient has a localized area of erythema and warmth concerning for cellulitis   Skin:     General: Skin is warm and dry.      Capillary Refill: Capillary refill takes less than 2 seconds.   Neurological:      General: No focal deficit present.      Mental Status: He is alert and oriented to person, place, and time.   Psychiatric:         Mood and Affect: Mood normal.         Behavior: Behavior normal.         Procedures           ED Course                                           Medical Decision Making  Patient has failed outpatient therapy he has tried Keflex and Neosporin has not been able to follow-up with podiatry on examination  patient's wound is increasingly swollen with tenderness erythema and warmth concerning for an extending cellulitis patient be started on Vanco Zosyn blood cultures been drawn lactic acid been drawn patient be admitted to the hospitalist pending callback at this time    Discussed with clinical pharmacy patient is on Vanco and Zosyn however given kidney function we will switch him to cefepime for cellulitis patient will need every 12 Vanco renally dosed discussed with pharmacy who will take care of the Vanco dosing patient will now be on cefepime we will discontinue the Zosyn waiting admission    Cellulitis, unspecified cellulitis site: acute illness or injury  Diabetic foot: acute illness or injury  Amount and/or Complexity of Data Reviewed  Labs: ordered.  Radiology: ordered.     Details:    CT Lower Extremity Left Without Contrast (Final result)  Result time 05/14/23 08:30:10  Final result             Impression:     Impression:   Mild soft tissue swelling of the lateral ankle and foot without underlying organized fluid collection. No acute osseous abnormalities.         Electronically Signed: Isaiah Leiva    5/14/2023 8:30 AM EDT    Workstation ID: [id number]        Narrative:     CT LOWER EXTREMITY LEFT WO CONTRAST     Date of Exam: 5/14/2023 7:50 AM EDT     Indication: infection failed outpatient.     Comparison: None available.     Technique: Axial CT images were obtained of the left lower extremity without contrast administration.  Sagittal and coronal reconstructions were performed.  Automated exposure control and iterative reconstruction methods were used.       Findings:   Bones: No evidence of acute fracture. Sequela of old lateral ankle injury with well-corticated osseous body along the fibular tip. Diffuse bone demineralization. Degenerative changes of the tibiotalar joint, calcaneocuboid joint and interphalangeal   joints. No erosive changes.     Soft tissues: Diffuse muscular atrophy. Soft tissue  swelling of the subcutaneous lateral ankle and foot. No obvious fluid collections or masses seen. Vascular calcification.                    Risk  Prescription drug management.  Decision regarding hospitalization.          Final diagnoses:   Cellulitis, unspecified cellulitis site   Diabetic foot       ED Disposition  ED Disposition     ED Disposition   Decision to Admit    Condition   --    Comment   Level of Care: Med/Surg [1]   Diagnosis: Cellulitis, unspecified cellulitis site [[id number]]   Admitting Physician: MONICA MEI [[id number]]   Attending Physician: BENITO MEI [[id number]]   Certification: I Certify That Inpatient Hospital Services Are Medically Necessary For Greater Than 2 Midnights               No follow-up provider specified.       Medication List      No changes were made to your prescriptions during this visit.

## 2023-05-15 ENCOUNTER — READMISSION MANAGEMENT (OUTPATIENT)
Dept: CALL CENTER | Facility: HOSPITAL | Age: 83
End: 2023-05-15
Payer: MEDICARE

## 2023-05-15 VITALS
HEART RATE: 67 BPM | TEMPERATURE: 97.9 F | BODY MASS INDEX: 28.01 KG/M2 | SYSTOLIC BLOOD PRESSURE: 144 MMHG | DIASTOLIC BLOOD PRESSURE: 81 MMHG | RESPIRATION RATE: 14 BRPM | WEIGHT: 230 LBS | HEIGHT: 76 IN | OXYGEN SATURATION: 95 %

## 2023-05-15 LAB
ANION GAP SERPL CALCULATED.3IONS-SCNC: 11 MMOL/L (ref 5–15)
BUN SERPL-MCNC: 27 MG/DL (ref 8–23)
BUN/CREAT SERPL: 17.9 (ref 7–25)
CALCIUM SPEC-SCNC: 8.6 MG/DL (ref 8.6–10.5)
CHLORIDE SERPL-SCNC: 107 MMOL/L (ref 98–107)
CO2 SERPL-SCNC: 24 MMOL/L (ref 22–29)
CREAT SERPL-MCNC: 1.51 MG/DL (ref 0.76–1.27)
CRP SERPL-MCNC: 8.12 MG/DL (ref 0–0.5)
DEPRECATED RDW RBC AUTO: 47.3 FL (ref 37–54)
EGFRCR SERPLBLD CKD-EPI 2021: 46.4 ML/MIN/1.73
ERYTHROCYTE [DISTWIDTH] IN BLOOD BY AUTOMATED COUNT: 13.6 % (ref 12.3–15.4)
ERYTHROCYTE [SEDIMENTATION RATE] IN BLOOD: 29 MM/HR (ref 0–20)
GLUCOSE BLDC GLUCOMTR-MCNC: 132 MG/DL (ref 70–105)
GLUCOSE SERPL-MCNC: 193 MG/DL (ref 65–99)
HCT VFR BLD AUTO: 35.8 % (ref 37.5–51)
HGB BLD-MCNC: 12 G/DL (ref 13–17.7)
MCH RBC QN AUTO: 31.6 PG (ref 26.6–33)
MCHC RBC AUTO-ENTMCNC: 33.5 G/DL (ref 31.5–35.7)
MCV RBC AUTO: 94.5 FL (ref 79–97)
PLATELET # BLD AUTO: 185 10*3/MM3 (ref 140–450)
PMV BLD AUTO: 9 FL (ref 6–12)
POTASSIUM SERPL-SCNC: 4.6 MMOL/L (ref 3.5–5.2)
RBC # BLD AUTO: 3.79 10*6/MM3 (ref 4.14–5.8)
SODIUM SERPL-SCNC: 142 MMOL/L (ref 136–145)
VANCOMYCIN SERPL-MCNC: 7.6 MCG/ML (ref 5–40)
WBC NRBC COR # BLD: 8.9 10*3/MM3 (ref 3.4–10.8)

## 2023-05-15 PROCEDURE — 0 CEFEPIME PER 500 MG: Performed by: EMERGENCY MEDICINE

## 2023-05-15 PROCEDURE — 82948 REAGENT STRIP/BLOOD GLUCOSE: CPT

## 2023-05-15 PROCEDURE — 25010000002 HEPARIN (PORCINE) PER 1000 UNITS: Performed by: STUDENT IN AN ORGANIZED HEALTH CARE EDUCATION/TRAINING PROGRAM

## 2023-05-15 RX ORDER — LOSARTAN POTASSIUM 25 MG/1
25 TABLET ORAL DAILY
Status: DISCONTINUED | OUTPATIENT
Start: 2023-05-15 | End: 2023-05-15 | Stop reason: HOSPADM

## 2023-05-15 RX ORDER — DOXYCYCLINE HYCLATE 100 MG/1
100 CAPSULE ORAL 2 TIMES DAILY
Qty: 14 CAPSULE | Refills: 0 | Status: SHIPPED | OUTPATIENT
Start: 2023-05-15 | End: 2023-05-22

## 2023-05-15 RX ADMIN — CEFEPIME 2 G: 2 INJECTION, POWDER, FOR SOLUTION INTRAVENOUS at 06:03

## 2023-05-15 RX ADMIN — HEPARIN SODIUM 5000 UNITS: 5000 INJECTION INTRAVENOUS; SUBCUTANEOUS at 06:04

## 2023-05-15 NOTE — DISCHARGE SUMMARY
[facility] Medicine Services   DISCHARGE SUMMARY    Patient Name: Wilfredo Mcnamara  : 1940  MRN: [id number]    Date of Admission: 2023  Date of Discharge:  05/15/23    Primary Care Physician: Nigel Tinoco MD      Presenting Problem:   Diabetic foot [E11.8]  Cellulitis, unspecified cellulitis site [L03.90]    Active and Resolved Hospital Problems:  Active Hospital Problems    Diagnosis POA   • **Cellulitis, unspecified cellulitis site [L03.90] Yes      Resolved Hospital Problems   No resolved problems to display.         Hospital Course     Hospital Course:  Wilfredo Mcnamara is a 82 y.o. male with PMHx of HTN, HLD, ANNE, DM, CKD stage IIIa who presented to [facility] on 2023 complaining of left foot pain.  Patient states he stepped on a plastic toy with left foot stepping out of hot tub two days ago and had small puncture wound and erythema of left lateral foot.  Failed otpt abx from .  Due to worsening swelling and erythema of left foot he presented back to . CT of the left foot negative for any abscess or deep lying infection.  Received IV antibiotics initially with cefepime and vancomycin, MRSA screen positive.  Superficial wound culture does not show any organisms, blood cultures remain negative.  Patient's symptoms have significantly improved and he feels the redness and swelling is also significantly better.  Patient feels better and wants to go home today.  Agrees to follow-up with PCP within this week for recheck of the infection.  Switch antibiotics to p.o. doxycycline on discharge and discontinue home Keflex.  Creatinine remains at baseline, no IVELISSE noted. Patient is medically stable to discharge home with follow-up with PCP as an outpatient.     DISCHARGE Follow Up Recommendations for labs and diagnostics:   Follow-up with PCP within 2 to 3 days.    Reasons For Change In Medications and Indications for New Medications:  Keflex discontinued.  Doxycycline  added.    Day of Discharge     Vital Signs:  Temp:  [97.7 °F (36.5 °C)-98.3 °F (36.8 °C)] 97.9 °F (36.6 °C)  Heart Rate:  [59-74] 67  Resp:  [14-18] 14  BP: (127-166)/(58-82) 144/81    Physical Exam:  General: Awake, alert, NAD  Eyes: PERRL, EOMI, conjunctivae are clear  Cardiovascular: Regular rate and rhythm, no murmurs  Respiratory: Clear to auscultation bilaterally, no wheezing or rales, unlabored breathing  Abdomen: Soft, nontender, positive bowel sounds, no guarding  Neurologic: A&O, CN grossly intact, moves all extremities spontaneously  Musculoskeletal: Normal range of motion, no other gross deformities  Skin: Warm, left foot lateral plantar surface wound, no active drainage, no deep tracking, nontender, mild surrounding erythema is improving.  Not warm to touch.        Pertinent  and/or Most Recent Results     LAB RESULTS:      Lab 05/14/23  2359 05/14/23  0733   WBC 8.90 10.04   HEMOGLOBIN 12.0* 12.5*   HEMATOCRIT 35.8* 37.5   PLATELETS 185 189   NEUTROS ABS  --  7.58*   IMMATURE GRANS (ABS)  --  0.03   LYMPHS ABS  --  0.90   MONOS ABS  --  1.32*   EOS ABS  --  0.18   MCV 94.5 93.3   SED RATE 29* 21*   CRP 8.12* 9.61*   LACTATE  --  1.3         Lab 05/14/23  2359 05/14/23  0733   SODIUM 142 138   POTASSIUM 4.6 4.5   CHLORIDE 107 101   CO2 24.0 26.5   ANION GAP 11.0 10.5   BUN 27* 33*   CREATININE 1.51* 1.54*   EGFR 46.4* 45.3*   GLUCOSE 193* 160*   CALCIUM 8.6 9.5         Lab 05/14/23  0733   TOTAL PROTEIN 7.0   ALBUMIN 4.0   GLOBULIN 3.0   ALT (SGPT) 14   AST (SGOT) 14   BILIRUBIN 1.1   ALK PHOS 55                     Brief Urine Lab Results     None        Microbiology Results (last 10 days)     Procedure Component Value - Date/Time    Wound Culture - Wound, Foot, Left [[id number]] Collected: 05/14/23 2344    Lab Status: Preliminary result Specimen: Wound from Foot, Left Updated: 05/15/23 0639     Gram Stain No WBCs or organisms seen    MRSA Screen, PCR (Inpatient) - Swab, Nares [[id number]]  (Abnormal)  Collected: 05/14/23 1743    Lab Status: Final result Specimen: Swab from Nares Updated: 05/14/23 2259     MRSA PCR MRSA Detected    Narrative:      The negative predictive value of this diagnostic test is high and should only be used to consider de-escalating anti-MRSA therapy. A positive result may indicate colonization with MRSA and must be correlated clinically.    COVID-19 and FLU A/B PCR - Swab, Nasopharynx [[id number]]  (Normal) Collected: 05/14/23 1743    Lab Status: Final result Specimen: Swab from Nasopharynx Updated: 05/14/23 1810     COVID19 Not Detected     Influenza A PCR Not Detected     Influenza B PCR Not Detected    Narrative:      Fact sheet for providers: https://www.fda.gov/media/780361/download    Fact sheet for patients: https://www.fda.gov/media/710567/download    Test performed by PCR.    Blood Culture - Blood, Arm, Left [[id number]]  (Normal) Collected: 05/14/23 0733    Lab Status: Preliminary result Specimen: Blood from Arm, Left Updated: 05/15/23 0745     Blood Culture No growth at 24 hours    Blood Culture - Blood, Hand, Right [[id number]]  (Normal) Collected: 05/14/23 0733    Lab Status: Preliminary result Specimen: Blood from Hand, Right Updated: 05/15/23 0745     Blood Culture No growth at 24 hours          XR Foot 3+ View Left    Result Date: 5/12/2023  Impression: Impression: No acute left foot findings. No plain film evidence of osteomyelitis. Mild degenerative changes. Electronically Signed: Lexie Keen  5/12/2023 12:33 PM EDT  Workstation ID: [id number]    CT Lower Extremity Left Without Contrast    Result Date: 5/14/2023  Impression: Impression: Mild soft tissue swelling of the lateral ankle and foot without underlying organized fluid collection. No acute osseous abnormalities. Electronically Signed: Isaiah Leiva  5/14/2023 8:30 AM EDT  Workstation ID: [id number]              Results for orders placed during the hospital encounter of 12/07/22    Adult Transthoracic Echo Complete  W/ Cont if Necessary Per Protocol    Interpretation Summary  •  Left ventricular ejection fraction appears to be 51 - 55%.  •  Left ventricular diastolic function is consistent with (grade I) impaired relaxation.  •  Mild dilation of the aortic root is present.  4.3 cm  •  No significant valvular abnormalities      Labs Pending at Discharge:  Pending Labs     Order Current Status    Blood Culture - Blood, Arm, Left Preliminary result    Blood Culture - Blood, Hand, Right Preliminary result    Wound Culture - Wound, Foot, Left Preliminary result          Procedures Performed           Consults:   Consults     No orders found for last 30 day(s).            Discharge Details        Discharge Medications      New Medications      Instructions Start Date   doxycycline 100 MG capsule  Commonly known as: VIBRAMYCIN   100 mg, Oral, 2 Times Daily         Continue These Medications      Instructions Start Date   ascorbic acid 500 MG tablet  Commonly known as: VITAMIN C   500 mg, Oral, Daily      aspirin 81 MG EC tablet   81 mg, Oral, Daily      atorvastatin 40 MG tablet  Commonly known as: LIPITOR   40 mg, Oral, Nightly      CHOLECALCIFEROL PO   2,000 Units, Oral, Daily      clopidogrel 75 MG tablet  Commonly known as: PLAVIX   75 mg, Oral, Daily      clotrimazole-betamethasone 1-0.05 % cream  Commonly known as: LOTRISONE   Apply to affected areas on legs BID x 4 weeks      FREESTYLE LITE test strip  Generic drug: glucose blood   1 each, Other, Daily, use to test blood sugar once daily      gabapentin 300 MG capsule  Commonly known as: NEURONTIN   600 mg, Oral, 2 Times Daily      glimepiride 2 MG tablet  Commonly known as: AMARYL   2 mg, Oral, Daily      losartan 25 MG tablet  Commonly known as: COZAAR   1 tablet, Oral, Daily      metFORMIN 500 MG tablet  Commonly known as: GLUCOPHAGE   1,000 mg, Oral, 2 Times Daily With Meals      multivitamin with minerals tablet tablet   1 tablet, Oral, Daily      mupirocin 2 %  ointment  Commonly known as: BACTROBAN   APPLY TOPICALLY TO WOUNDS TWICE DAILY      nitroglycerin 0.4 MG SL tablet  Commonly known as: NITROSTAT   0.4 mg, Sublingual, Every 5 Minutes PRN, Take no more than 3 doses in 15 minutes.      vitamin B-12 1000 MCG tablet  Commonly known as: CYANOCOBALAMIN   2,500 mcg, Oral, Daily         Stop These Medications    cephalexin 500 MG capsule  Commonly known as: KEFLEX            No Known Allergies      Discharge Disposition:  Home or Self Care    Diet:  Hospital:  Diet Order   Procedures   • Diet: Cardiac Diets, Diabetic Diets; Healthy Heart (2-3 Na+); Consistent Carbohydrate; Texture: Regular Texture (IDDSI 7); Fluid Consistency: Thin (IDDSI 0)         Discharge Activity:         CODE STATUS:  Code Status and Medical Interventions:   Ordered at: 05/14/23 1905     Code Status (Patient has no pulse and is not breathing):    CPR (Attempt to Resuscitate)     Medical Interventions (Patient has pulse or is breathing):    Full Support         Future Appointments   Date Time Provider Department Center   6/7/2023  2:00 PM Haris Estrada MD [facility]           Time spent on Discharge including face to face service:  25 minutes    Signature:    Electronically signed by Juan Mckeon DO, 05/15/23, 9:31 AM EDT.      Part of this note may be an electronic transcription/translation of spoken language to printed text using the Dragon Dictation System.

## 2023-05-15 NOTE — PLAN OF CARE
Goal Outcome Evaluation:      Patient doing well, will discharge home. Patient will discharge home on PO antibiotics and will follow up with PCP. Patient has call light within reach and is able to make needs known.

## 2023-05-15 NOTE — CASE MANAGEMENT/SOCIAL WORK
Case Management Discharge Note      Final Note: home         Selected Continued Care - Discharged on 5/15/2023 Admission date: 5/14/2023 - Discharge disposition: Home or Self Care         Transportation Services  Private: Car    Final Discharge Disposition Code: 01 - home or self-care

## 2023-05-15 NOTE — DISCHARGE INSTR - ACTIVITY
Activity as tolerated    Notify MD if redness increases or have increased temps    May take over the counter colace or miralax or other stool softener

## 2023-05-15 NOTE — PLAN OF CARE
Goal Outcome Evaluation:  Plan of Care Reviewed With: patient        Progress: no change  Outcome Evaluation: Pt is here with Left foot cellulitis, VSS, IV antibiotics. Pt has been resting with call light in reach. Will continue to monitor.

## 2023-05-15 NOTE — PROGRESS NOTES
"Pharmacy Antimicrobial Dosing Service    Subjective:  Wilfredo Mcnamara is a 82 y.o.male with pharmacy consulted to dose Vancomycin and Cefepime for cellulitis.     PMH: Recent fall and started on antibiotics outpatient for cellulitis       Assessment/Plan    1. Day #2 Vancomycin: Pulse dosing d/t renal dysfxn.   Pt last received dose of vancomycin 2000 mg (~19 mg/kg ABW) 5/14 at ~0815.  Random level today, ~16H after dose, =  7.6 mcg/mL. Will give 1500 mg today d/t level < 20 mcg/mL. Obtain random level 5/16 w/am labs and redose when level expected to be < 20 mcg/mL.    2. Day #2 Cefepime: 2000 mg IV q 12h for estCrCl 30-59 mL/min.    Will continue to monitor drug levels, renal function, culture and sensitivities, and patient clinical status.       Objective:  Relevant clinical data and objective history reviewed:  193 cm (76\")   104 kg (230 lb)   Ideal body weight: 86.8 kg (191 lb 5.7 oz)  Adjusted ideal body weight: 93.8 kg (206 lb 13 oz)  Body mass index is 28 kg/m².    Results from last 7 days   Lab Units 05/14/23  2359   VANCOMYCIN RM mcg/mL 7.60     Results from last 7 days   Lab Units 05/14/23  2359 05/14/23  0733   CREATININE mg/dL 1.51* 1.54*     Estimated Creatinine Clearance: 57.4 mL/min (A) (by C-G formula based on SCr of 1.51 mg/dL (H)).  I/O last 3 completed shifts:  In: 1340 [P.O.:240; I.V.:1000; IV Piggyback:100]  Out: -     Results from last 7 days   Lab Units 05/14/23 2359 05/14/23  0733   WBC 10*3/mm3 8.90 10.04     Temperature    05/14/23 2251 05/15/23 0500 05/15/23 0845   Temp: 98 °F (36.7 °C) 97.7 °F (36.5 °C) 97.9 °F (36.6 °C)     Baseline culture/source/susceptibility:  Microbiology Results (last 10 days)       Procedure Component Value - Date/Time    Wound Culture - Wound, Foot, Left [[id number]] Collected: 05/14/23 2342    Lab Status: Preliminary result Specimen: Wound from Foot, Left Updated: 05/15/23 0639     Gram Stain No WBCs or organisms seen    MRSA Screen, PCR (Inpatient) - Swab, Nares " [[id number]]  (Abnormal) Collected: 05/14/23 1743    Lab Status: Final result Specimen: Swab from Nares Updated: 05/14/23 2259     MRSA PCR MRSA Detected    Narrative:      The negative predictive value of this diagnostic test is high and should only be used to consider de-escalating anti-MRSA therapy. A positive result may indicate colonization with MRSA and must be correlated clinically.    COVID-19 and FLU A/B PCR - Swab, Nasopharynx [[id number]]  (Normal) Collected: 05/14/23 1743    Lab Status: Final result Specimen: Swab from Nasopharynx Updated: 05/14/23 1810     COVID19 Not Detected     Influenza A PCR Not Detected     Influenza B PCR Not Detected    Narrative:      Fact sheet for providers: https://www.fda.gov/media/708232/download    Fact sheet for patients: https://www.fda.gov/media/144117/download    Test performed by PCR.    Blood Culture - Blood, Arm, Left [[id number]]  (Normal) Collected: 05/14/23 0733    Lab Status: Preliminary result Specimen: Blood from Arm, Left Updated: 05/15/23 0745     Blood Culture No growth at 24 hours    Blood Culture - Blood, Hand, Right [[id number]]  (Normal) Collected: 05/14/23 0733    Lab Status: Preliminary result Specimen: Blood from Hand, Right Updated: 05/15/23 0745     Blood Culture No growth at 24 hours            Anti-Infectives (From admission, onward)      Ordered     Dose/Rate Route Frequency Start Stop    05/15/23 0847  vancomycin 1500 mg/500 mL 0.9% NS IVPB ([facility])        Ordering Provider: Juan Mckeon DO    1,500 mg Intravenous Once 05/15/23 0846      05/14/23 2256  Vancomycin Pharmacy Intermittent/Pulse Dosing        Ordering Provider: Selvin Buckner DO     Does not apply As Needed 05/14/23 2256 05/19/23 2255 05/14/23 2244  Pharmacy to dose vancomycin        Ordering Provider: Selvin Buckner DO     Does not apply Continuous PRN 05/14/23 2244 05/19/23 2243 05/14/23 2244  Pharmacy to Dose Cefepime        Ordering Provider: Selvin Buckner DO     Does  not apply Continuous PRN 05/14/23 2244 05/19/23 2243    05/14/23 1705  cefepime 2 gm IVPB in 100 ml NS ([facility])        Ordering Provider: Pako Mckeon MD    2 g  over 4 Hours Intravenous Every 12 Hours 05/14/23 1800 05/19/23 1759    05/14/23 0719  vancomycin IVPB 2000 mg in 0.9% Sodium Chloride (premix) 500 mL        Ordering Provider: Pako Mckeon MD    20 mg/kg × 104 kg Intravenous Once 05/14/23 0815 05/14/23 1026    05/14/23 0719  piperacillin-tazobactam (ZOSYN) IVPB 4.5 g in 100 mL NS (CD)        Ordering Provider: Pako Mckeon MD    4.5 g Intravenous Once 05/14/23 0815 05/14/23 0825            Diana Clark, PharmD  05/15/23 08:47 EDT

## 2023-05-16 NOTE — OUTREACH NOTE
Prep Survey    Flowsheet Row Responses   [facility] facility patient discharged from? [facility]   Is LACE score < 7 ? No   Eligibility Readm Mgmt   Discharge diagnosis Cellulitis   Does the patient have one of the following disease processes/diagnoses(primary or secondary)? Other   Does the patient have Home health ordered? No   Is there a DME ordered? No   Prep survey completed? Yes          Cora RIVERA - Registered Nurse

## 2023-05-18 ENCOUNTER — READMISSION MANAGEMENT (OUTPATIENT)
Dept: CALL CENTER | Facility: HOSPITAL | Age: 83
End: 2023-05-18
Payer: MEDICARE

## 2023-05-18 LAB
BACTERIA SPEC AEROBE CULT: NORMAL
GRAM STN SPEC: NORMAL

## 2023-05-18 NOTE — OUTREACH NOTE
Medical Week 1 Survey    Flowsheet Row Responses   [facility] patient discharged from? [facility]   Does the patient have one of the following disease processes/diagnoses(primary or secondary)? Other   Week 1 attempt successful? Yes   Call start time 1444   Call end time 1447   Discharge diagnosis Cellulitis, diabetic foot   Person spoke with today (if not patient) and relationship Patient   Meds reviewed with patient/caregiver? Yes   Does the patient have all medications ordered at discharge? Yes   Is the patient taking all medications as directed (includes completed medication regime)? Yes   Does the patient have a primary care provider?  Yes   Does the patient have an appointment with their PCP within 7 days of discharge? Yes   Comments regarding PCP Nigel Tinoco MD PCP   Has the patient kept scheduled appointments due by today? Yes   Has home health visited the patient within 72 hours of discharge? N/A   Psychosocial issues? No   Did the patient receive a copy of their discharge instructions? Yes   Nursing interventions Reviewed instructions with patient   What is the patient's perception of their health status since discharge? Improving   Is the patient/caregiver able to teach back signs and symptoms related to disease process for when to call PCP? Yes   Is the patient/caregiver able to teach back signs and symptoms related to disease process for when to call 911? Yes   Is the patient/caregiver able to teach back the hierarchy of who to call/visit for symptoms/problems? PCP, Specialist, Home health nurse, Urgent Care, ED, 911 Yes   If the patient is a current smoker, are they able to teach back resources for cessation? Not a smoker   Week 1 call completed? Yes   Graduated Yes   Is the patient interested in additional calls from an ambulatory ?  NOTE:  applies to high risk patients requiring additional follow-up. No   Graduated/Revoked comments Patient reports that he is showing good  improvement. Has had f/u with PCP. Denies any questions or needs.          SATNAM CORRALES - Registered Nurse

## 2023-05-19 LAB
BACTERIA SPEC AEROBE CULT: NORMAL
BACTERIA SPEC AEROBE CULT: NORMAL

## 2023-05-22 NOTE — PROGRESS NOTES
"Enter Query Response Below      Query Response: cellulitis due to diabetes             If applicable, please update the problem list.       Patient: Wilfredo Mcnamara        : 1940  Account: [id number]           Admit Date: 2023        How to Respond to this query:       a. Click New Note     b. Answer query within the yellow box.                c. Update the Problem List, if applicable.      If you have any questions about this query contact me at: [email or web address]     Dr. Mckeon,    H&P dated  includes, \"Patient states he stepped on a plastic toy with left foot stepping out of hot tub two days ago and had small puncture wound and erythema of left lateral foot.   Failed otpt abx from [facility].  Due to worsening swelling and erythema of left foot he presented back to .\"  Patient has history of diabetes with home medications including glimepiride and metformin.  Patient was treated with:  IV fluids  IV Zosyn   IV vancomycin   IV cefepime  - 5/15  and discharged with doxycycline.  No HgbA1C was done during this encounter.    Can this be further specified as:    - cellulitis due to diabetes  - cellulitis not due to diabetes  - other _______________________  - clinically indeterminable    By submitting this query, we are merely seeking further clarification of documentation to accurately reflect all conditions that you are monitoring, evaluating, treating or that extend the hospitalization or utilize additional resources of care. Please utilize your independent clinical judgment when addressing the question(s) above.     This query and your response, once completed, will be entered into the legal medical record.    Sincerely,  Deidra Whitfield RN CCDS [facility]  Clinical Documentation Integrity Program     "

## 2023-06-07 ENCOUNTER — OFFICE VISIT (OUTPATIENT)
Dept: CARDIOLOGY | Facility: CLINIC | Age: 83
End: 2023-06-07
Payer: MEDICARE

## 2023-06-07 VITALS
OXYGEN SATURATION: 95 % | HEART RATE: 70 BPM | BODY MASS INDEX: 28.13 KG/M2 | DIASTOLIC BLOOD PRESSURE: 66 MMHG | SYSTOLIC BLOOD PRESSURE: 139 MMHG | WEIGHT: 231 LBS | HEIGHT: 76 IN

## 2023-06-07 DIAGNOSIS — E78.2 MIXED HYPERLIPIDEMIA: ICD-10-CM

## 2023-06-07 DIAGNOSIS — G47.33 OBSTRUCTIVE SLEEP APNEA SYNDROME: ICD-10-CM

## 2023-06-07 DIAGNOSIS — E11.9 TYPE 2 DIABETES MELLITUS WITHOUT COMPLICATION, WITHOUT LONG-TERM CURRENT USE OF INSULIN: ICD-10-CM

## 2023-06-07 DIAGNOSIS — I25.110 CORONARY ARTERY DISEASE INVOLVING NATIVE CORONARY ARTERY OF NATIVE HEART WITH UNSTABLE ANGINA PECTORIS: ICD-10-CM

## 2023-06-07 DIAGNOSIS — I21.4 NSTEMI (NON-ST ELEVATED MYOCARDIAL INFARCTION): Primary | ICD-10-CM

## 2023-06-07 DIAGNOSIS — I71.21 AORTIC ROOT ANEURYSM: ICD-10-CM

## 2023-06-07 DIAGNOSIS — I10 ESSENTIAL HYPERTENSION: ICD-10-CM

## 2023-06-07 DIAGNOSIS — M16.12 PRIMARY OSTEOARTHRITIS OF LEFT HIP: ICD-10-CM

## 2023-06-07 DIAGNOSIS — E66.3 OVERWEIGHT (BMI 25.0-29.9): ICD-10-CM

## 2023-06-07 RX ORDER — CARVEDILOL 6.25 MG/1
6.25 TABLET ORAL 2 TIMES DAILY
Qty: 180 TABLET | Refills: 3 | Status: SHIPPED | OUTPATIENT
Start: 2023-06-07

## 2023-06-07 NOTE — PROGRESS NOTES
Encounter Date:06/07/2023        Patient ID: Wilfredo Mcnamara is a 82 y.o. male.      Chief Complaint:      History of Present Illness  Wilfredo Mcnamara is a 82 y.o. male with hypertension, hyperlipidemia, diabetes with peripheral neuropathy who presented to the hospital with complaints of chest pain.  He was diagnosed with non-ST elevation MI with changes in his ECG and rising troponins..  He was taken to the Cath Lab urgently and was noted to have 99% thrombotic lesion in his mid left circumflex artery which was treated with placement of a drug-eluting stent on 12/8/2022.   3.5 x 23 mm Xience ruperto point stent was placed and was postdilated with 4.5 mm balloon.  He was started on dual antiplatelet therapy.  Of note there was 40 to 50% diffuse disease in the LAD and 70% lesion in the small PLV.  An echocardiogram showed preserved LV function, grade 1 diastolic dysfunction with mild dilatation of aortic root 4.3 cm.     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  He denies any chest pain or shortness of breath.    The following portions of the patient's history were reviewed and updated as appropriate: allergies, current medications, past family history, past medical history, past social history, past surgical history, and problem list.    Review of Systems   Constitutional: Negative for malaise/fatigue.   Cardiovascular:  Negative for chest pain, dyspnea on exertion, leg swelling and palpitations.   Respiratory:  Negative for cough and shortness of breath.    Gastrointestinal:  Negative for abdominal pain, nausea and vomiting.   Neurological:  Positive for numbness. Negative for dizziness, focal weakness, headaches and light-headedness.   All other systems reviewed and are negative.      Current Outpatient Medications:     ascorbic acid (VITAMIN C) 500 MG tablet, Take 1 tablet by mouth Daily., Disp: , Rfl:     aspirin 81 MG EC tablet, Take 1 tablet by mouth Daily., Disp: 30 tablet,  Rfl: 0    atorvastatin (LIPITOR) 40 MG tablet, Take 1 tablet by mouth Every Night., Disp: 90 tablet, Rfl: 3    CHOLECALCIFEROL PO, Take 2,000 Units by mouth Daily., Disp: , Rfl:     clopidogrel (PLAVIX) 75 MG tablet, Take 1 tablet by mouth Daily., Disp: 30 tablet, Rfl: 3    FREESTYLE LITE test strip, 1 each by Other route Daily. use to test blood sugar once daily, Disp: , Rfl:     gabapentin (NEURONTIN) 300 MG capsule, Take 2 capsules by mouth 2 (Two) Times a Day., Disp: , Rfl:     glimepiride (AMARYL) 2 MG tablet, Take 1 tablet by mouth Daily., Disp: , Rfl:     losartan (COZAAR) 25 MG tablet, Take 1 tablet by mouth Daily., Disp: , Rfl:     metFORMIN (GLUCOPHAGE) 500 MG tablet, Take 2 tablets by mouth 2 (Two) Times a Day With Meals., Disp: , Rfl:     multivitamin with minerals tablet tablet, Take 1 tablet by mouth Daily., Disp: , Rfl:     nitroglycerin (NITROSTAT) 0.4 MG SL tablet, Place 1 tablet under the tongue Every 5 (Five) Minutes As Needed for Chest Pain (Only if SBP Greater Than 100). Take no more than 3 doses in 15 minutes., Disp: 30 tablet, Rfl: 12    vitamin B-12 (CYANOCOBALAMIN) 1000 MCG tablet, Take 2.5 tablets by mouth Daily., Disp: , Rfl:     carvedilol (COREG) 6.25 MG tablet, Take 1 tablet by mouth 2 (Two) Times a Day., Disp: 180 tablet, Rfl: 3    Current outpatient and discharge medications have been reconciled for the patient.  Reviewed by: Haris Estrada MD       No Known Allergies    Family History   Family history unknown: Yes       Past Surgical History:   Procedure Laterality Date    CARDIAC CATHETERIZATION  12/08/2022    CARDIAC CATHETERIZATION Left 12/8/2022    Procedure: Left Heart Cath and coronary angiogram;  Surgeon: Haris Estrada MD;  Location: [facility] CATH INVASIVE LOCATION;  Service: Cardiovascular;  Laterality: Left;    CARDIAC CATHETERIZATION N/A 12/8/2022    Procedure: Percutaneous Coronary Intervention;  Surgeon: Haris Estrada MD;  Location: [facility] INVASIVE LOCATION;  Service:  "Cardiovascular;  Laterality: N/A;    CORONARY STENT PLACEMENT  12/07/2022    Status post PCI of left circumflex, the culprit vessel with IVUS guidance    INTERVENTIONAL RADIOLOGY PROCEDURE N/A 12/8/2022    Procedure: Intravascular Ultrasound;  Surgeon: Haris Estrada MD;  Location: [facility] INVASIVE LOCATION;  Service: Cardiovascular;  Laterality: N/A;       Past Medical History:   Diagnosis Date    Diabetes     Essential hypertension     Hyperlipidemia 03/05/2013    Obstructive sleep apnea syndrome 05/02/2022       Family History   Family history unknown: Yes       Social History     Socioeconomic History    Marital status:    Tobacco Use    Smoking status: Never     Passive exposure: Never    Smokeless tobacco: Never   Vaping Use    Vaping Use: Never used   Substance and Sexual Activity    Alcohol use: Not Currently    Drug use: Never    Sexual activity: Defer               Objective:       Physical Exam    /66 (BP Location: Right arm, Patient Position: Sitting, Cuff Size: Large Adult)   Pulse 70   Ht 193 cm (76\")   Wt 105 kg (231 lb)   SpO2 95%   BMI 28.12 kg/m²   The patient is alert, oriented and in no distress.    Vital signs as noted above.    Head and neck revealed no carotid bruits or jugular venous distension.  No thyromegaly or lymphadenopathy is present.    Lungs clear.  No wheezing.  Breath sounds are normal bilaterally.    Heart normal first and second heart sounds.  No murmur..  No pericardial rub is present.  No gallop is present.    Abdomen soft and nontender.  No organomegaly is present.    Extremities revealed good peripheral pulses without any pedal edema.    Skin warm and dry.    Musculoskeletal system is grossly normal.    CNS grossly normal.           Diagnosis Plan   1. NSTEMI (non-ST elevated myocardial infarction)  carvedilol (COREG) 6.25 MG tablet      2. Mixed hyperlipidemia        3. Essential hypertension  carvedilol (COREG) 6.25 MG tablet      4. Coronary artery " disease involving native coronary artery of native heart with unstable angina pectoris        5. Type 2 diabetes mellitus without complication, without long-term current use of insulin        6. Obstructive sleep apnea syndrome        7. Overweight (BMI 25.0-29.9)        8. Aortic root aneurysm        9. Primary osteoarthritis of left hip        LAB RESULTS (LAST 7 DAYS)    CBC        BMP        CMP         BNP        TROPONIN        CoAg        Creatinine Clearance  Estimated Creatinine Clearance: 51.9 mL/min (A) (by C-G formula based on SCr of 1.51 mg/dL (H)).    ABG        Radiology  No radiology results for the last day    EKG  Procedures    Stress test      Echocardiogram  Results for orders placed during the hospital encounter of 12/07/22    Adult Transthoracic Echo Complete W/ Cont if Necessary Per Protocol    Interpretation Summary    Left ventricular ejection fraction appears to be 51 - 55%.    Left ventricular diastolic function is consistent with (grade I) impaired relaxation.    Mild dilation of the aortic root is present.  4.3 cm    No significant valvular abnormalities      Cardiac catheterization  Results for orders placed during the hospital encounter of 12/07/22    Cardiac Catheterization/Vascular Study    Narrative  OPERATORS:  1. Haris Estrada M.D., Attending Cardiologist      PROCEDURE PERFORMED.  Ultrasound guided vascular access  Left heart catheterization  Coronary Angiogram 44058  Percutaneous coronary intervention with drug-eluting stent placement to LAD 97848  Intravascular ultrasound LAD 07638  Moderate Sedation 50 minutes    INDICATIONS FOR PROCEDURE.  82-year-old man presented with acute non-ST elevation myocardial infarction.  After discussing the risk and benefit of cardiac catheterization he was brought into the Cath Lab for urgent procedure    PROCEDURE IN DETAIL.  Informed consent was obtained from the patient after explaining the risks, benefits, and alternative options of the  procedure. After obtaining informed consent, the patient was brought to the cath lab and was prepped in a sterile fashion. Lidocaine 1% was used for local anesthesia into the right radial access site. The right radial artery was accessed under direct ultrasound visualization  with an angiocath needle via modified Seldinger technique. A 6F slender sheath was inserted successfully. Afterwards, 5F JR4  was advanced over a wire into the ascending aorta and used to cross the AV and obtain LV pressures.  AV gradient obtained via pullback technique. JR4 and JL3.5 diagnostic catheters were used to engage the ostia of the RCA and LM respectively. Images of the right and left coronary systems were obtained.    HEMODYNAMICS.  LV: 132/10, 17 mmHg  AO: 133/74, 100 mmHg    No significant gradient across aortic valve during pullback of JR4 catheter.    MEDICATIONS.  1. Versed  2. Fentanyl  3. Verapamil  4. Nitro  5. Heparin    FINDINGS.    Coronary Angiogram.    1. Left main. Left main is a large-caliber vessel which gives rise to the Left Anterior Descending and the Left circumflex.  Left main has no significant disease    2. Left Anterior Descending Artery. LAD is a large vessel which gives rise to several septal perforators and several diagonal branches.  In the midsegment it bifurcates into a diagonal and dual LAD system.  This has diffuse disease 50% throughout    3. Left Circumflex. The LCx is a meidum caliber which gives rise to marginals.  There is a 99% hazy looking lesion in the mid left circumflex with MARK II flow.    4. Right Coronary Artery. The RCA is a dominant vessel which gives rise to several small caliber branches.  PLV has 70% lesion in a small vessel 2 mm.  Overall RCA has luminal irregularities    Percutaneous coronary intervention  10,000 units of heparin were administered and ACT of more than 250 was documented.  Left main ostium was engaged with 6 Italian XB 3.5 guide catheter.  0.014 run-through wire was  then advanced into the distal left circumflex artery.  Lesion in the mid circumflex was then predilated with 2.5 x 12 noncompliant balloon.  Over the run-through wire I then advanced intravascular ultrasound catheter and obtained measurements.  Distal left circumflex measured 4 mm in dimension, proximal left circumflex measured 4.5 mm in dimension.  IVUS also confirmed 80% thrombotic lesion in the mid left circumflex.  I then placed 3.5 x 23 mm Xience ruperto point stent at 14 ashley.  This was postdilated with 4.5 x 15 mm noncompliant balloon at 16 ashley.  Final angiography showed 0% stenosis and MARK-3 flow.  Final IVUS showed fully expanded stents with excellent apposition.    All the catheters were exchanged over a wire and subsequently removed. The patient tolerated the procedure well without any complications. The pictures were reviewed at the end of the procedure. TR band applied to right wrist for hemostasis and inflated with 15 cc of air. No complications were encountered.    IMPRESSIONS.  1.  One-vessel obstructive coronary artery disease  2.  Status post PCI of left circumflex, the culprit vessel with IVUS guidance    RECOMMENDATIONS.  1.  Start dual antiplatelet therapy  2. Exercise and lifestyle modifications recommended.  3.  Risk factors modification  4.  Cardiac rehab referral          Assessment and Plan       Diagnoses and all orders for this visit:    1. NSTEMI (non-ST elevated myocardial infarction) (Primary)  -     carvedilol (COREG) 6.25 MG tablet; Take 1 tablet by mouth 2 (Two) Times a Day.  Dispense: 180 tablet; Refill: 3    2. Mixed hyperlipidemia    3. Essential hypertension  -     carvedilol (COREG) 6.25 MG tablet; Take 1 tablet by mouth 2 (Two) Times a Day.  Dispense: 180 tablet; Refill: 3    4. Coronary artery disease involving native coronary artery of native heart with unstable angina pectoris    5. Type 2 diabetes mellitus without complication, without long-term current use of insulin    6.  Obstructive sleep apnea syndrome    7. Overweight (BMI 25.0-29.9)    8. Aortic root aneurysm    9. Primary osteoarthritis of left hip         1. NSTEMI (non-ST elevated myocardial infarction) ([facility]) (Primary)  Status post PCI to left circumflex artery.  3.5 x 23 mm Xience ruperto point stent at 14 ashley.  This was postdilated with 4.5mm balloon.  Continue dual antiplatelet therapy, high intensity statin and beta-blocker  He completed cardiac rehab and has now resumed his workouts at [facility]  Echocardiogram shows EF of 51 to 55%, grade 1 diastolic dysfunction  Encouraged him to continue to stay active.  2. Mixed hyperlipidemia  Continue high intensity statin.  His LDL is 132, total cholesterol 201, HDL 36, triglyceride 183..   3. Essential hypertension  His blood pressures were better earlier on ACE inhibitor and beta-blocker.  Blood pressure is elevated today.  I will start him on Coreg 6.25 mg p.o. twice daily and continue losartan.  4. Coronary artery disease involving native coronary artery of native heart with unstable angina pectoris ([facility])  Continue risk factors modification  5. Type 2 diabetes mellitus without complication, without long-term current use of insulin ([facility])  Currently on glimepiride.   His HbA1c 6.9  Continue losartan for hypertension  6. Obstructive sleep apnea syndrome  Using CPAP  7. Overweight (BMI 25.0-29.9)  Discussed continuing exercise, diet and weight loss  8. Aortic root aneurysm  Aortic root is 4.3 cm  I have started him on Coreg for blood pressure and heart rate control.

## 2023-06-13 ENCOUNTER — TELEPHONE (OUTPATIENT)
Dept: CARDIOLOGY | Facility: CLINIC | Age: 83
End: 2023-06-13

## 2023-06-13 NOTE — TELEPHONE ENCOUNTER
Placed a call to patient-spoke to patient' wife-Carley-and she said that patient vomited/had the shakes earlier today and is laying in bed now.  Patient was started on Coreg 6.25mg BID by Dr. Estrada at [facility] on 6/7/23, and Carley said that patient's symptoms began since he started taking the med. Carley also said patient has been foggy/confused since starting Coreg, along with other symptoms listed below.  I told Carley that I would inform Dr. Estrada/[facility] Guan, but for patient not to take any Coreg until I have heard back from Dr. Estrada/ [facility] Guan.  Carley verbalizes understanding.    Please advise,    Thanks Frida

## 2023-06-13 NOTE — TELEPHONE ENCOUNTER
Caller: Wilfredo Mcnamara     Best call back number: [phone]    What is your medical concern? PT STATES THAT HE WAS STARTED ON A NEW MEDICATION AT LAST VISIT AND SINCE STARTING HE HAS BEEN HAVING WEIRD DREAMS, SLEEPING MORE, AND HE HAS FALLEN OUT OF HIS CHAIR WHILE SLEEPING - WHEN HE READ UP ON SIDE EFFECTS, HE STATES THAT THEY CORRELATED WITH HIM STARTING THE MEDICATION

## 2023-06-14 DIAGNOSIS — I10 ESSENTIAL HYPERTENSION: ICD-10-CM

## 2023-06-14 DIAGNOSIS — I21.4 NSTEMI (NON-ST ELEVATED MYOCARDIAL INFARCTION): ICD-10-CM

## 2023-06-14 RX ORDER — CARVEDILOL 6.25 MG/1
3.12 TABLET ORAL 2 TIMES DAILY
Start: 2023-06-14

## 2023-06-14 NOTE — TELEPHONE ENCOUNTER
I will decrease his Coreg to 3.125 mg twice daily. He can cut the 6.25 mg tablets in half. He should keep a daily log of blood pressure AND heart rate/pulse for 1 week and report back to us.

## 2023-06-14 NOTE — TELEPHONE ENCOUNTER
I placed a call to patient's wife-Carley-and informed her of orders from MARKUS Guan for patient to decrease his Coreg to 3.125 mg twice daily (may cut his 6.25mg tab in half) and to keep a daily log of his B/P and HR/PULSE for 1 week and report back to us.   Carley verbalizes understanding and said patient has no symptoms since he stopped taking the Coreg 6.25mg yesterday and will call back if patient's symptoms re-occur after restarting Coreg at a reduced dose.

## 2023-10-08 ENCOUNTER — HOSPITAL ENCOUNTER (EMERGENCY)
Facility: HOSPITAL | Age: 83
Discharge: HOME | End: 2023-10-08
Admitting: PEDIATRICS
Payer: MEDICARE

## 2023-10-08 ENCOUNTER — APPOINTMENT (OUTPATIENT)
Dept: CT IMAGING | Facility: HOSPITAL | Age: 83
End: 2023-10-08
Payer: MEDICARE

## 2023-10-08 VITALS
BODY MASS INDEX: 27.76 KG/M2 | HEART RATE: 63 BPM | TEMPERATURE: 98.2 F | RESPIRATION RATE: 20 BRPM | DIASTOLIC BLOOD PRESSURE: 69 MMHG | WEIGHT: 228 LBS | OXYGEN SATURATION: 98 % | HEIGHT: 76 IN | SYSTOLIC BLOOD PRESSURE: 125 MMHG

## 2023-10-08 DIAGNOSIS — N28.9 RENAL INSUFFICIENCY: ICD-10-CM

## 2023-10-08 DIAGNOSIS — I71.21 ANEURYSM OF ASCENDING AORTA WITHOUT RUPTURE: Primary | ICD-10-CM

## 2023-10-08 DIAGNOSIS — M54.50 ACUTE RIGHT-SIDED LOW BACK PAIN WITHOUT SCIATICA: ICD-10-CM

## 2023-10-08 LAB
ALBUMIN SERPL-MCNC: 4.3 G/DL (ref 3.5–5.2)
ALBUMIN/GLOB SERPL: 1.5 G/DL
ALP SERPL-CCNC: 71 U/L (ref 39–117)
ALT SERPL W P-5'-P-CCNC: 17 U/L (ref 1–41)
ANION GAP SERPL CALCULATED.3IONS-SCNC: 7.2 MMOL/L (ref 5–15)
AST SERPL-CCNC: 17 U/L (ref 1–40)
BASOPHILS # BLD AUTO: 0.02 10*3/MM3 (ref 0–0.2)
BASOPHILS NFR BLD AUTO: 0.3 % (ref 0–1.5)
BILIRUB SERPL-MCNC: 0.8 MG/DL (ref 0–1.2)
BILIRUB UR QL STRIP: NEGATIVE
BUN SERPL-MCNC: 28 MG/DL (ref 8–23)
BUN/CREAT SERPL: 19.2 (ref 7–25)
CALCIUM SPEC-SCNC: 9.6 MG/DL (ref 8.6–10.5)
CHLORIDE SERPL-SCNC: 102 MMOL/L (ref 98–107)
CLARITY UR: CLEAR
CO2 SERPL-SCNC: 27.8 MMOL/L (ref 22–29)
COLOR UR: YELLOW
CREAT SERPL-MCNC: 1.46 MG/DL (ref 0.76–1.27)
DEPRECATED RDW RBC AUTO: 46.8 FL (ref 37–54)
EGFRCR SERPLBLD CKD-EPI 2021: 48.3 ML/MIN/1.73
EOSINOPHIL # BLD AUTO: 0.08 10*3/MM3 (ref 0–0.4)
EOSINOPHIL NFR BLD AUTO: 1.2 % (ref 0.3–6.2)
ERYTHROCYTE [DISTWIDTH] IN BLOOD BY AUTOMATED COUNT: 13.5 % (ref 12.3–15.4)
GEN 5 2HR TROPONIN T REFLEX: 30 NG/L
GLOBULIN UR ELPH-MCNC: 2.8 GM/DL
GLUCOSE SERPL-MCNC: 196 MG/DL (ref 65–99)
GLUCOSE UR STRIP-MCNC: NEGATIVE MG/DL
HCT VFR BLD AUTO: 40.5 % (ref 37.5–51)
HGB BLD-MCNC: 13 G/DL (ref 13–17.7)
HGB UR QL STRIP.AUTO: NEGATIVE
IMM GRANULOCYTES # BLD AUTO: 0 10*3/MM3 (ref 0–0.05)
IMM GRANULOCYTES NFR BLD AUTO: 0 % (ref 0–0.5)
KETONES UR QL STRIP: NEGATIVE
LEUKOCYTE ESTERASE UR QL STRIP.AUTO: NEGATIVE
LYMPHOCYTES # BLD AUTO: 0.95 10*3/MM3 (ref 0.7–3.1)
LYMPHOCYTES NFR BLD AUTO: 13.7 % (ref 19.6–45.3)
MCH RBC QN AUTO: 30.2 PG (ref 26.6–33)
MCHC RBC AUTO-ENTMCNC: 32.1 G/DL (ref 31.5–35.7)
MCV RBC AUTO: 94 FL (ref 79–97)
MONOCYTES # BLD AUTO: 0.7 10*3/MM3 (ref 0.1–0.9)
MONOCYTES NFR BLD AUTO: 10.1 % (ref 5–12)
NEUTROPHILS NFR BLD AUTO: 5.2 10*3/MM3 (ref 1.7–7)
NEUTROPHILS NFR BLD AUTO: 74.7 % (ref 42.7–76)
NITRITE UR QL STRIP: NEGATIVE
NT-PROBNP SERPL-MCNC: 635.6 PG/ML (ref 0–1800)
PH UR STRIP.AUTO: 6.5 [PH] (ref 5–8)
PLATELET # BLD AUTO: 214 10*3/MM3 (ref 140–450)
PMV BLD AUTO: 10.3 FL (ref 6–12)
POTASSIUM SERPL-SCNC: 4.3 MMOL/L (ref 3.5–5.2)
PROT SERPL-MCNC: 7.1 G/DL (ref 6–8.5)
PROT UR QL STRIP: ABNORMAL
RBC # BLD AUTO: 4.31 10*6/MM3 (ref 4.14–5.8)
SODIUM SERPL-SCNC: 137 MMOL/L (ref 136–145)
SP GR UR STRIP: 1.01 (ref 1–1.03)
TROPONIN T DELTA: -5 NG/L
TROPONIN T SERPL HS-MCNC: 35 NG/L
UROBILINOGEN UR QL STRIP: ABNORMAL
WBC NRBC COR # BLD: 6.95 10*3/MM3 (ref 3.4–10.8)

## 2023-10-08 PROCEDURE — 36415 COLL VENOUS BLD VENIPUNCTURE: CPT

## 2023-10-08 PROCEDURE — 96360 HYDRATION IV INFUSION INIT: CPT

## 2023-10-08 PROCEDURE — 84484 ASSAY OF TROPONIN QUANT: CPT | Performed by: NURSE PRACTITIONER

## 2023-10-08 PROCEDURE — 25510000001 IOPAMIDOL PER 1 ML: Performed by: PEDIATRICS

## 2023-10-08 PROCEDURE — 80053 COMPREHEN METABOLIC PANEL: CPT | Performed by: NURSE PRACTITIONER

## 2023-10-08 PROCEDURE — 81003 URINALYSIS AUTO W/O SCOPE: CPT

## 2023-10-08 PROCEDURE — 83880 ASSAY OF NATRIURETIC PEPTIDE: CPT | Performed by: NURSE PRACTITIONER

## 2023-10-08 PROCEDURE — 25810000003 SODIUM CHLORIDE 0.9 % SOLUTION: Performed by: NURSE PRACTITIONER

## 2023-10-08 PROCEDURE — 74174 CTA ABD&PLVS W/CONTRAST: CPT

## 2023-10-08 PROCEDURE — 71275 CT ANGIOGRAPHY CHEST: CPT

## 2023-10-08 PROCEDURE — 85025 COMPLETE CBC W/AUTO DIFF WBC: CPT | Performed by: NURSE PRACTITIONER

## 2023-10-08 PROCEDURE — 99285 EMERGENCY DEPT VISIT HI MDM: CPT

## 2023-10-08 PROCEDURE — 93005 ELECTROCARDIOGRAM TRACING: CPT | Performed by: NURSE PRACTITIONER

## 2023-10-08 RX ORDER — SODIUM CHLORIDE 0.9 % (FLUSH) 0.9 %
10 SYRINGE (ML) INJECTION AS NEEDED
Status: DISCONTINUED | OUTPATIENT
Start: 2023-10-08 | End: 2023-10-08 | Stop reason: HOSPADM

## 2023-10-08 RX ADMIN — SODIUM CHLORIDE 1000 ML: 9 INJECTION, SOLUTION INTRAVENOUS at 11:51

## 2023-10-08 RX ADMIN — IOPAMIDOL 100 ML: 755 INJECTION, SOLUTION INTRAVENOUS at 10:59

## 2023-10-08 NOTE — ED NOTES
"Pt presents to this facility with c/o R sided flank pain. Pt reports woke up this morning to with a throbbing pain in his side which pt describes as \"felt like something blew up\". Pt reports a sharp, stabbing pain upon ambulation. Pt denies pain while in a sitting position. Pt reports recent diarrhea. Pt denies CP, SOB, HA, dizziness, changes in urination. Pt reports had no symptoms when went to bed last night.   "

## 2023-10-08 NOTE — DISCHARGE INSTRUCTIONS
Call tomorrow to schedule appointment to see Dr. Sharp.  When calling state that the Nurse Practitioner Ann Hassan spoke with Dr. Sharp on phone yesterday.    Tylenol for pain    Although you are being discharged from the ED today, I encourage you to return for worsening symptoms. Things can, and do, change such that treatment at home with medication may not be adequate. Specifically I recommend returning for chest pain or discomfort, difficulty breathing, persistent vomiting or difficulty holding down liquids or medications, fever > 102.0 F,  or any other worsening or alarming symptoms.     Rest. Drink plenty of fluids.  Follow up with PCP or provider listed for further evaluation and management.  Follow up with primary care provider for further management and to have blood pressure rechecked.  Take all medications as prescribed.

## 2023-10-08 NOTE — [facility] PROVIDER NOTE
"        EMERGENCY DEPARTMENT ENCOUNTER    Room Number:  02/02  Date seen:  10/8/2023  Time seen: 10:19 EDT  PCP: Nigel Tinoco MD  Historian: Patient    Discussed/obtained information from independent historians: n/a    HPI:  Chief complaint:severe right sided back pain  A complete HPI/ROS/PMH/PSH/SH/FH are unobtainable due to: n/a  Context:Wilfredo Mcnamara is a 82 y.o. male with history of obstructive sleep apnea, PVCs, cardiac stents, non-STEMI, coronary artery disease and aortic root aneurysm, type 2 diabetes who presents to the ED with c/o waking up this am with severe right sided back pain described as pulsating and \"something exploding inside\".  He has not had this problem before.  Denies chest discomfort or shortness of breath.  He has felt a bit lightheaded.  Pt states that when seated he has no pain it is there with any movement.     External (non-ED) record review: Patient's most recent cardiac cath was December 2022.  One-vessel obstructive coronary artery disease and had a successful PCI of left circumflex.  It was recommended he start dual antiplatelet therapy.    Chronic or social conditions impacting care: n/a    ALLERGIES  Patient has no known allergies.    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    Hyperlipidemia 03/05/2013    Type 2 diabetes mellitus 02/14/2013    NSTEMI (non-ST elevated myocardial infarction) 12/07/2022    Obstructive sleep apnea syndrome 05/02/2022    Essential hypertension     Overweight (BMI 25.0-29.9) 12/09/2022    Coronary artery disease involving native coronary artery of native heart with unstable angina pectoris 12/07/2022    Cellulitis, unspecified cellulitis site 05/14/2023     Resolved Ambulatory Problems     Diagnosis Date Noted    No Resolved Ambulatory Problems     Past Medical History:   Diagnosis Date    Diabetes        PAST SURGICAL HISTORY  Past Surgical History:   Procedure Laterality Date    " CARDIAC CATHETERIZATION  12/08/2022    CARDIAC CATHETERIZATION Left 12/8/2022    Procedure: Left Heart Cath and coronary angiogram;  Surgeon: Haris Estrada MD;  Location: [facility] CATH INVASIVE LOCATION;  Service: Cardiovascular;  Laterality: Left;    CARDIAC CATHETERIZATION N/A 12/8/2022    Procedure: Percutaneous Coronary Intervention;  Surgeon: Haris Estrada MD;  Location: [facility] CATH INVASIVE LOCATION;  Service: Cardiovascular;  Laterality: N/A;    CORONARY STENT PLACEMENT  12/07/2022    Status post PCI of left circumflex, the culprit vessel with IVUS guidance    INTERVENTIONAL RADIOLOGY PROCEDURE N/A 12/8/2022    Procedure: Intravascular Ultrasound;  Surgeon: Haris Estrada MD;  Location: [facility] CATH INVASIVE LOCATION;  Service: Cardiovascular;  Laterality: N/A;       FAMILY HISTORY  Family History   Family history unknown: Yes       SOCIAL HISTORY  Social History     Socioeconomic History    Marital status:    Tobacco Use    Smoking status: Never     Passive exposure: Never    Smokeless tobacco: Never   Vaping Use    Vaping Use: Never used   Substance and Sexual Activity    Alcohol use: Not Currently    Drug use: Never    Sexual activity: Defer       REVIEW OF SYSTEMS  Review of Systems    All systems reviewed and negative except for those discussed in HPI.     PHYSICAL EXAM    I have reviewed the triage vital signs and nursing notes.  Vitals:    10/08/23 1202   BP: 125/69   Pulse: 63   Resp:    Temp:    SpO2: 98%     Physical Exam    GENERAL: not distressed, elderly  HENT: nares patent  EYES: no scleral icterus  NECK: no ROM limitations  CV: regular rhythm, regular rate, no murmur  RESPIRATORY: normal effort, CTAB  ABDOMEN: soft  : deferred  MUSCULOSKELETAL: no deformity to thoracic or lumbar spine.  Well healed, old back surgery incision.    NEURO: alert, moves all extremities, follows commands  SKIN: warm, dry    LAB RESULTS  Recent Results (from the past 24 hour(s))   Urinalysis without microscopic  (no culture) - Urine, Clean Catch    Collection Time: 10/08/23  9:46 AM    Specimen: Urine, Clean Catch   Result Value Ref Range    Color, UA Yellow Yellow, Straw    Appearance, UA Clear Clear    pH, UA 6.5 5.0 - 8.0    Specific Gravity, UA 1.015 1.005 - 1.030    Glucose, UA Negative Negative    Ketones, UA Negative Negative    Bilirubin, UA Negative Negative    Blood, UA Negative Negative    Protein, UA 30 mg/dL (1+) (A) Negative    Leuk Esterase, UA Negative Negative    Nitrite, UA Negative Negative    Urobilinogen, UA 0.2 E.U./dL 0.2 - 1.0 E.U./dL   Comprehensive Metabolic Panel    Collection Time: 10/08/23 10:32 AM    Specimen: Blood   Result Value Ref Range    Glucose 196 (H) 65 - 99 mg/dL    BUN 28 (H) 8 - 23 mg/dL    Creatinine 1.46 (H) 0.76 - 1.27 mg/dL    Sodium 137 136 - 145 mmol/L    Potassium 4.3 3.5 - 5.2 mmol/L    Chloride 102 98 - 107 mmol/L    CO2 27.8 22.0 - 29.0 mmol/L    Calcium 9.6 8.6 - 10.5 mg/dL    Total Protein 7.1 6.0 - 8.5 g/dL    Albumin 4.3 3.5 - 5.2 g/dL    ALT (SGPT) 17 1 - 41 U/L    AST (SGOT) 17 1 - 40 U/L    Alkaline Phosphatase 71 39 - 117 U/L    Total Bilirubin 0.8 0.0 - 1.2 mg/dL    Globulin 2.8 gm/dL    A/G Ratio 1.5 g/dL    BUN/Creatinine Ratio 19.2 7.0 - 25.0    Anion Gap 7.2 5.0 - 15.0 mmol/L    eGFR 48.3 (L) >60.0 mL/min/1.73   High Sensitivity Troponin T    Collection Time: 10/08/23 10:32 AM    Specimen: Blood   Result Value Ref Range    HS Troponin T 35 (H) <15 ng/L   BNP    Collection Time: 10/08/23 10:32 AM    Specimen: Blood   Result Value Ref Range    proBNP 635.6 0.0 - 1,800.0 pg/mL   CBC Auto Differential    Collection Time: 10/08/23 10:32 AM    Specimen: Blood   Result Value Ref Range    WBC 6.95 3.40 - 10.80 10*3/mm3    RBC 4.31 4.14 - 5.80 10*6/mm3    Hemoglobin 13.0 13.0 - 17.7 g/dL    Hematocrit 40.5 37.5 - 51.0 %    MCV 94.0 79.0 - 97.0 fL    MCH 30.2 26.6 - 33.0 pg    MCHC 32.1 31.5 - 35.7 g/dL    RDW 13.5 12.3 - 15.4 %    RDW-SD 46.8 37.0 - 54.0 fl    MPV 10.3  6.0 - 12.0 fL    Platelets 214 140 - 450 10*3/mm3    Neutrophil % 74.7 42.7 - 76.0 %    Lymphocyte % 13.7 (L) 19.6 - 45.3 %    Monocyte % 10.1 5.0 - 12.0 %    Eosinophil % 1.2 0.3 - 6.2 %    Basophil % 0.3 0.0 - 1.5 %    Immature Grans % 0.0 0.0 - 0.5 %    Neutrophils, Absolute 5.20 1.70 - 7.00 10*3/mm3    Lymphocytes, Absolute 0.95 0.70 - 3.10 10*3/mm3    Monocytes, Absolute 0.70 0.10 - 0.90 10*3/mm3    Eosinophils, Absolute 0.08 0.00 - 0.40 10*3/mm3    Basophils, Absolute 0.02 0.00 - 0.20 10*3/mm3    Immature Grans, Absolute 0.00 0.00 - 0.05 10*3/mm3   High Sensitivity Troponin T 2Hr    Collection Time: 10/08/23 12:36 PM    Specimen: Blood   Result Value Ref Range    HS Troponin T 30 (H) <15 ng/L    Troponin T Delta -5 (L) >=-4 - <+4 ng/L       Ordered the above labs and independently interpreted results.  My findings will be discussed in the ED course or medical decision making section below    RADIOLOGY RESULTS  CT Angiogram Chest    Result Date: 10/8/2023  CT ANGIOGRAM CHEST, CT ANGIOGRAM ABDOMEN PELVIS Date of Exam: 10/8/2023 10:57 AM EDT Indication: aortic aneurysm. , Chest pain, back pain Comparison: None available. Technique: CTA of the chest, abdomen and pelvis was performed after the uneventful intravenous administration of iodinated contrast. Reconstructed coronal and sagittal images were also obtained. In addition, a 3-D volume rendered image was created for interpretation. Automated exposure control and iterative reconstruction methods were used. Findings: Chest: Soft tissues of the lower neck are without acute or mallee. The aortic arch branch vasculature is patent. The heart is enlarged. Coronary artery calcifications noted with coronary stents. Negative for pulmonary embolus. Atherosclerotic plaque involving the thoracic aorta. Negative for aortic dissection. Aorta at the sinus of Valsalva measures 4.6 x 4.1 cm. Aorta at the sinotubular junction measures 3.5 x 3.4 cm. Aorta at the level of the main  pulmonary measures 4.2 x 4 cm. Aorta at the mid aortic arch measures 3.2 x 3.3 cm. Proximal descending thoracic aorta measures 3.6 x 3.5 cm. Mid descending thoracic aorta measures 3 x 3 cm. The distal thoracic aorta at the diaphragmatic zuri measures 2.8 x 2.9 cm. Scattered mediastinal lymph nodes are below size criteria. Tiny hiatal hernia. Trachea and mainstem bronchi are patent. Negative for pneumothorax. No pleural effusion. No consolidation or findings of pneumonia. Small 4 mm nodule in the right middle lobe abutting the pulmonary fissure likely intrapulmonary lymph node (3/56). Similar 5 mm nodule noted in the right middle lobe adjacent to the minor fissure (3/67). No aggressive osseous lesion or acute fracture. Abdomen and pelvis: The liver and spleen are normal in size and contour. Normal adrenal glands. Pancreas without findings of pancreatitis. Gallbladder present. No pericholecystic inflammation. No biliary dilatation. Normal renal enhancement. No hydronephrosis or hydroureter. Urinary bladder is without acute abnormality. Streak artifact from left hip prosthesis partially limits bladder and pelvic assessment. Prostate mildly enlarged. Negative for pneumoperitoneum. Colonic diverticulosis without diverticulitis. No bowel obstruction. Normal appendix. The celiac axis and superior mesenteric artery are patent bilateral renal arteries are patent. Duplicated left renal artery noted. Atherosclerotic plaque involving the infrarenal aorta without abdominal aortic aneurysm. Max abdominal aortic diameter measures 2.5 cm. Mild aneurysmal dilatation of the right common iliac artery measuring 1.8 cm. The bilateral common, internal, and external iliac arteries are patent. Visualized portions of the common femoral, superficial femoral and deep profunda femoral arteries are patent. Postoperative changes in the lumbar spine related to posterior fixation at L2-S1. Left hip prosthesis. Moderate right hip osteoarthritis. No  aggressive osseous lesion or fracture. Laminectomy defects noted in the lumbar spine at L4 and L5.      Impression: 1. No acute abnormality in the chest, abdomen, or pelvis, specifically negative for aortic dissection. 2. Aneurysmal dilatation of ascending aorta measuring 4.6 cm at the sinus of Valsalva and 4.2 cm at the level of the main pulmonary artery. 3. Coronary artery disease, prostatomegaly, colonic diverticulosis and additional incidental findings above. Electronically Signed: Bubba Still MD  10/8/2023 12:20 PM EDT  Workstation ID: [id number]    CT Angiogram Abdomen Pelvis    Result Date: 10/8/2023  CT ANGIOGRAM CHEST, CT ANGIOGRAM ABDOMEN PELVIS Date of Exam: 10/8/2023 10:57 AM EDT Indication: aortic aneurysm. , Chest pain, back pain Comparison: None available. Technique: CTA of the chest, abdomen and pelvis was performed after the uneventful intravenous administration of iodinated contrast. Reconstructed coronal and sagittal images were also obtained. In addition, a 3-D volume rendered image was created for interpretation. Automated exposure control and iterative reconstruction methods were used. Findings: Chest: Soft tissues of the lower neck are without acute or mallee. The aortic arch branch vasculature is patent. The heart is enlarged. Coronary artery calcifications noted with coronary stents. Negative for pulmonary embolus. Atherosclerotic plaque involving the thoracic aorta. Negative for aortic dissection. Aorta at the sinus of Valsalva measures 4.6 x 4.1 cm. Aorta at the sinotubular junction measures 3.5 x 3.4 cm. Aorta at the level of the main pulmonary measures 4.2 x 4 cm. Aorta at the mid aortic arch measures 3.2 x 3.3 cm. Proximal descending thoracic aorta measures 3.6 x 3.5 cm. Mid descending thoracic aorta measures 3 x 3 cm. The distal thoracic aorta at the diaphragmatic zuri measures 2.8 x 2.9 cm. Scattered mediastinal lymph nodes are below size criteria. Tiny hiatal hernia. Trachea and  mainstem bronchi are patent. Negative for pneumothorax. No pleural effusion. No consolidation or findings of pneumonia. Small 4 mm nodule in the right middle lobe abutting the pulmonary fissure likely intrapulmonary lymph node (3/56). Similar 5 mm nodule noted in the right middle lobe adjacent to the minor fissure (3/67). No aggressive osseous lesion or acute fracture. Abdomen and pelvis: The liver and spleen are normal in size and contour. Normal adrenal glands. Pancreas without findings of pancreatitis. Gallbladder present. No pericholecystic inflammation. No biliary dilatation. Normal renal enhancement. No hydronephrosis or hydroureter. Urinary bladder is without acute abnormality. Streak artifact from left hip prosthesis partially limits bladder and pelvic assessment. Prostate mildly enlarged. Negative for pneumoperitoneum. Colonic diverticulosis without diverticulitis. No bowel obstruction. Normal appendix. The celiac axis and superior mesenteric artery are patent bilateral renal arteries are patent. Duplicated left renal artery noted. Atherosclerotic plaque involving the infrarenal aorta without abdominal aortic aneurysm. Max abdominal aortic diameter measures 2.5 cm. Mild aneurysmal dilatation of the right common iliac artery measuring 1.8 cm. The bilateral common, internal, and external iliac arteries are patent. Visualized portions of the common femoral, superficial femoral and deep profunda femoral arteries are patent. Postoperative changes in the lumbar spine related to posterior fixation at L2-S1. Left hip prosthesis. Moderate right hip osteoarthritis. No aggressive osseous lesion or fracture. Laminectomy defects noted in the lumbar spine at L4 and L5.      Impression: 1. No acute abnormality in the chest, abdomen, or pelvis, specifically negative for aortic dissection. 2. Aneurysmal dilatation of ascending aorta measuring 4.6 cm at the sinus of Valsalva and 4.2 cm at the level of the main pulmonary  artery. 3. Coronary artery disease, prostatomegaly, colonic diverticulosis and additional incidental findings above. Electronically Signed: Bubba Still MD  10/8/2023 12:20 PM EDT  Workstation ID: [id number]      Ordered the above noted radiological studies.  Independently interpreted by me.  My findings will be discussed in the medical decision section below.     PROGRESS, DATA ANALYSIS, CONSULTS AND MEDICAL DECISION MAKING    Please note that this section constitutes my independent interpretation of clinical data including lab results, radiology, EKG's.  This constitutes my independent professional opinion regarding differential diagnosis and management of this patient.  It may include any factors such as history from outside sources, review of external records, social determinants of health, management of medications, response to those treatments, and discussions with other providers.    ED Course as of 10/08/23 1355   Sun Oct 08, 2023   1103 Creatinine(!): 1.46  Pt given 1 liters of NS.   [EW]   1104 HS Troponin T(!): 35  Pt consistently has elevated troponin.  Today is lower than usual.  [EW]   1104 DDX: STEMI, aortic dissection, aortic aneurysm.  [EW]   1303 I discussed the patient's imaging with Dr. Sharp on call for Cardiothoracic surgery.  No acute findings but he would like to see patient in his office.  [EW]      ED Course User Index  [EW] Ann Hassan APRN     Orders placed during this visit:  Orders Placed This Encounter   Procedures    CT Angiogram Chest    CT Angiogram Abdomen Pelvis    Urinalysis without microscopic (no culture) - Urine, Clean Catch    Comprehensive Metabolic Panel    High Sensitivity Troponin T    BNP    CBC Auto Differential    High Sensitivity Troponin T 2Hr    Pulse Oximetry Continuous    ECG 12 Lead Chest Pain    Blood Draw With IV Start    Insert peripheral IV    CBC & Differential    ED Acknowledgement Form Needed;            Medical Decision Making  Problems  Addressed:  Acute right-sided low back pain without sciatica: complicated acute illness or injury  Aneurysm of ascending aorta without rupture: complicated acute illness or injury  Renal insufficiency: complicated acute illness or injury    Amount and/or Complexity of Data Reviewed  Labs: ordered. Decision-making details documented in ED Course.  Radiology: ordered.  ECG/medicine tests: ordered.    Risk  Prescription drug management.    With patient's description of pain I was immediately concerned for aorta problem.  CTA completed chest/abdomen/pelvis which did not demonstrate acute abnormality but the ascending aorta is enlarged.  I did discuss first with Dr. Steve, Vascular Surgery and then with [facility], Dr. Sharp.  The aorta is not acutely causing a problem but is enlarged and he does need outpatient follow up.  The patient did have elevated troponin, lower than usual and the repeat was improved.  He had no complaints of chest pain, shortness of breath.  His pulsing back pain improved without  me treating for pain.  I did discuss to watch the area should he develop a rash of Zoster.  When I was updated the patient and his wife on imaging he did mention walking a lot yesterday in his yard.  This could be contributing to the pain.  His EKG was not acute.  He was discharged home with follow up to see Dr. Sharp.    EKG          EKG time: 1030  Rhythm/Rate: 69, sinus rhythm  P waves and MT: prolonged MICHELLE  QRS, axis: normal QRS and axis, LVH  ST and T waves: no acute ST/T wave abnormalities    Interpreted Contemporaneously by me, independently viewed  Changed from prior dated 2/11/23.  PAC was noted.  Rate similar        DIAGNOSIS  Final diagnoses:   Aneurysm of ascending aorta without rupture   Acute right-sided low back pain without sciatica   Renal insufficiency          Medication List      No changes were made to your prescriptions during this visit.         FOLLOW-UP  Hugh Sharp MD  [address] [facility]  [address] [place] [id number]  [phone]    Schedule an appointment as soon as possible for a visit           Latest Documented Vital Signs:  As of 13:55 EDT  BP- 125/69 HR- 63 Temp- 98.2 øF (36.8 øC) O2 sat- 98%    Appropriate PPE utilized throughout this patient encounter to include mask, if indicated, per current protocol. Hand hygiene was performed before donning PPE and after removal when leaving the room.    Please note that portions of this were completed with a voice recognition program.     Note Disclaimer: At [facility], we believe that sharing information builds trust and better relationships. You are receiving this note because you are receiving care at [facility] or recently visited. It is possible you will see health information before a provider has talked with you about it. This kind of information can be easy to misunderstand. To help you fully understand what it means for your health, we urge you to discuss this note with your provider.

## 2023-10-14 LAB
QT INTERVAL: 405 MS
QTC INTERVAL: 435 MS

## 2023-10-26 ENCOUNTER — OFFICE VISIT (OUTPATIENT)
Dept: CARDIAC SURGERY | Facility: CLINIC | Age: 83
End: 2023-10-26
Payer: MEDICARE

## 2023-10-26 VITALS
SYSTOLIC BLOOD PRESSURE: 140 MMHG | HEART RATE: 70 BPM | DIASTOLIC BLOOD PRESSURE: 84 MMHG | RESPIRATION RATE: 20 BRPM | OXYGEN SATURATION: 95 % | BODY MASS INDEX: 28.6 KG/M2 | WEIGHT: 230 LBS | HEIGHT: 75 IN | TEMPERATURE: 97.3 F

## 2023-10-26 DIAGNOSIS — I71.21 ANEURYSM OF ASCENDING AORTA WITHOUT RUPTURE: ICD-10-CM

## 2023-10-26 DIAGNOSIS — I10 ESSENTIAL HYPERTENSION: Primary | Chronic | ICD-10-CM

## 2023-10-26 DIAGNOSIS — I21.4 NSTEMI (NON-ST ELEVATED MYOCARDIAL INFARCTION): ICD-10-CM

## 2023-10-26 DIAGNOSIS — G47.33 OBSTRUCTIVE SLEEP APNEA SYNDROME: Chronic | ICD-10-CM

## 2023-10-26 DIAGNOSIS — E11.9 TYPE 2 DIABETES MELLITUS WITHOUT COMPLICATION, WITHOUT LONG-TERM CURRENT USE OF INSULIN: Chronic | ICD-10-CM

## 2023-10-26 DIAGNOSIS — I25.110 CORONARY ARTERY DISEASE INVOLVING NATIVE CORONARY ARTERY OF NATIVE HEART WITH UNSTABLE ANGINA PECTORIS: ICD-10-CM

## 2023-10-26 NOTE — LETTER
October 26, 2023     Nigel Tinoco MD  [address] [facility]  [address]  [place] [address]    Patient: Wilfredo Mcnamara   YOB: 1940   Date of Visit: 10/26/2023     Dear Nigel Tinoco MD:       Thank you for referring Wilfredo Mcnamara to me for evaluation. Below are the relevant portions of my assessment and plan of care.    If you have questions, please do not hesitate to call me. I look forward to following Wilfredo along with you.         Sincerely,        Hugh Sharp MD        CC: MD Aron Key Sebastian, MD  10/26/23 1613  Sign when Signing Visit  10/26/2023    Reason for consultation:   Evaluation of ascending aortic aneurysm    Chief Complaint     History of Present Illness:       Dear Colleagues,  It was nice to see Wilfredo Mcnamara in consultation at your request. He is a 82 y.o. male with hyperlipidemia, diabetes, coronary artery disease status post recent non-ST elevation MI and stenting of the circumflex, hypertension, sleep apnea and overweight who was recently evaluated with an echocardiogram during admission and was found to have mild dilatation of the ascending aorta and root. He denies any aneurysm related signs or symptoms. His chest CT showed the ascending aorta 4.6 cm at the root, sinotubular junction at 3.4 cm, 4.2 cm at the level of the main pulmonary artery and 3.2 cm in the mid aortic arch to a 2.8 cm in the diaphragmatic zuri.  He has no family history of aneurysms, dissections or connective tissue disorders.  He had a 2D echo cardiogram that showed an ejection fraction of 55% and no significant aortic or mitral valve disease and mild dilatation of the aortic root.     Patient Active Problem List   Diagnosis   • Polyosteoarthritis   • Hyperlipidemia   • Type 2 diabetes mellitus   • NSTEMI (non-ST elevated myocardial infarction)   • Obstructive sleep apnea syndrome   • Essential hypertension   • Overweight (BMI 25.0-29.9)   • Coronary artery disease involving  native coronary artery of native heart with unstable angina pectoris   • Cellulitis, unspecified cellulitis site   • Aneurysm of ascending aorta without rupture       Past Medical History:   Diagnosis Date   • Diabetes    • Essential hypertension    • Hyperlipidemia 03/05/2013   • Obstructive sleep apnea syndrome 05/02/2022       Past Surgical History:   Procedure Laterality Date   • CARDIAC CATHETERIZATION  12/08/2022   • CARDIAC CATHETERIZATION Left 12/8/2022    Procedure: Left Heart Cath and coronary angiogram;  Surgeon: Haris Estrada MD;  Location: [facility] CATH INVASIVE LOCATION;  Service: Cardiovascular;  Laterality: Left;   • CARDIAC CATHETERIZATION N/A 12/8/2022    Procedure: Percutaneous Coronary Intervention;  Surgeon: Haris Estrada MD;  Location: [facility] CATH INVASIVE LOCATION;  Service: Cardiovascular;  Laterality: N/A;   • CORONARY STENT PLACEMENT  12/07/2022    Status post PCI of left circumflex, the culprit vessel with IVUS guidance   • INTERVENTIONAL RADIOLOGY PROCEDURE N/A 12/8/2022    Procedure: Intravascular Ultrasound;  Surgeon: Haris Estrada MD;  Location: [facility] CATH INVASIVE LOCATION;  Service: Cardiovascular;  Laterality: N/A;       No Known Allergies      Current Outpatient Medications:   •  ascorbic acid (VITAMIN C) 500 MG tablet, Take 1 tablet by mouth Daily., Disp: , Rfl:   •  aspirin 81 MG EC tablet, Take 1 tablet by mouth Daily., Disp: 30 tablet, Rfl: 0  •  atorvastatin (LIPITOR) 40 MG tablet, Take 1 tablet by mouth Every Night., Disp: 90 tablet, Rfl: 3  •  carvedilol (COREG) 6.25 MG tablet, Take 0.5 tablets by mouth 2 (Two) Times a Day., Disp: , Rfl:   •  CHOLECALCIFEROL PO, Take 2,000 Units by mouth Daily., Disp: , Rfl:   •  clopidogrel (PLAVIX) 75 MG tablet, Take 1 tablet by mouth Daily., Disp: 30 tablet, Rfl: 3  •  FREESTYLE LITE test strip, 1 each by Other route Daily. use to test blood sugar once daily, Disp: , Rfl:   •  gabapentin (NEURONTIN) 300 MG capsule, Take 2 capsules by mouth  2 (Two) Times a Day., Disp: , Rfl:   •  glimepiride (AMARYL) 2 MG tablet, Take 1 tablet by mouth Daily., Disp: , Rfl:   •  losartan (COZAAR) 25 MG tablet, Take 1 tablet by mouth Daily., Disp: , Rfl:   •  metFORMIN (GLUCOPHAGE) 500 MG tablet, Take 2 tablets by mouth 2 (Two) Times a Day With Meals., Disp: , Rfl:   •  multivitamin with minerals tablet tablet, Take 1 tablet by mouth Daily., Disp: , Rfl:   •  nitroglycerin (NITROSTAT) 0.4 MG SL tablet, Place 1 tablet under the tongue Every 5 (Five) Minutes As Needed for Chest Pain (Only if SBP Greater Than 100). Take no more than 3 doses in 15 minutes., Disp: 30 tablet, Rfl: 12  •  vitamin B-12 (CYANOCOBALAMIN) 1000 MCG tablet, Take 2.5 tablets by mouth Daily., Disp: , Rfl:     Social History     Socioeconomic History   • Marital status:    Tobacco Use   • Smoking status: Never     Passive exposure: Never   • Smokeless tobacco: Never   Vaping Use   • Vaping Use: Never used   Substance and Sexual Activity   • Alcohol use: Not Currently   • Drug use: Never   • Sexual activity: Defer       Family History   Family history unknown: Yes     Review of Systems  As HPI, otherwise noncontributory    Physical Exam:    Vital Signs:  Weight: 104 kg (230 lb)   Body mass index is 28.75 kg/m².  Temp: 97.3 °F (36.3 °C)   Heart Rate: 70   BP: 140/84     Constitutional:       Appearance: Well-developed.   Eyes:      Conjunctiva/sclera: Conjunctivae normal.      Pupils: Pupils are equal, round, and reactive to light.   HENT:      Head: Normocephalic and atraumatic.      Nose: Nose normal.   Neck:      Thyroid: No thyromegaly.      Vascular: No JVD.      Lymphadenopathy: No cervical adenopathy.   Pulmonary:      Effort: Pulmonary effort is normal.      Breath sounds: Normal breath sounds. No rales.   Cardiovascular:      Normal rate. Regular rhythm.      Murmurs: There is no murmur.      No gallop.    Pulses:     Intact distal pulses. No decreased pulses.   Edema:     Peripheral edema  absent.   Abdominal:      General: Bowel sounds are normal. There is no distension.      Palpations: Abdomen is soft. There is no abdominal mass.      Tenderness: There is no abdominal tenderness.   Musculoskeletal: Normal range of motion.         General: No tenderness or deformity.      Cervical back: Normal range of motion and neck supple. Skin:     General: Skin is warm and dry.      Findings: No erythema or rash.   Neurological:      Mental Status: Alert and oriented to person, place, and time.      Deep Tendon Reflexes: Reflexes are normal and symmetric.   Psychiatric:         Behavior: Behavior normal.         Relevant studies (other than HPI)        Assessment:     Problems Addressed this Visit          Cardiac and Vasculature    Essential hypertension - Primary (Chronic)    NSTEMI (non-ST elevated myocardial infarction)    Coronary artery disease involving native coronary artery of native heart with unstable angina pectoris       Endocrine and Metabolic    Type 2 diabetes mellitus (Chronic)       Sleep    Obstructive sleep apnea syndrome (Chronic)       Other    Aneurysm of ascending aorta without rupture     Diagnoses         Codes Comments    Essential hypertension    -  Primary ICD-10-CM: I10  ICD-9-CM: 401.9     NSTEMI (non-ST elevated myocardial infarction)     ICD-10-CM: I21.4  ICD-9-CM: 410.70     Coronary artery disease involving native coronary artery of native heart with unstable angina pectoris     ICD-10-CM: I25.110  ICD-9-CM: 414.01, 411.1     Type 2 diabetes mellitus without complication, without long-term current use of insulin     ICD-10-CM: E11.9  ICD-9-CM: 250.00     Obstructive sleep apnea syndrome     ICD-10-CM: G47.33  ICD-9-CM: 327.23     Aneurysm of ascending aorta without rupture     ICD-10-CM: I71.21  ICD-9-CM: 441.2                 Assessment/recommendation/Plan:     Incidental finding of 4.6 cm aortic root dilatation without dissection or ulceration.  He had recent treatment of  coronary artery disease with a stent.  He is clinically stable.  Discussed with the patient the natural history of aneurysm disease and the guidelines for intervention.  I discussed with him the importance of longitudinal follow-up for surveillance with a chest CT in 1 year and follow-up in our thoracic aortic surgical clinic.  He verbalized understanding and treatment  2.   Hypertension borderline control.  His blood pressure is 150 mmHg systolic.  Discussed with him the goal of a systolic pressure less than 135 mmHg.  I instructed the patient to follow a low-salt diet  3.  Coronary artery disease status post recent stent.  Chest pain-free.  Followed by the cardiology team    Thank you for allowing me to participate in his care.    Regards,    Hugh Sharp M.D.    Been over 45 minutes before, during and after the office visit in reviewing the records, examining the patient, reviewing interpreting myself the echo Carton, the chest CTA, the cardiac cath, discussing the findings and options with the patient, discussing the natural history of aneurysm disease and the guidelines for intervention follow-up, discussing the importance of low blood pressure and spent time in documenting in the electronic record

## 2023-10-26 NOTE — PROGRESS NOTES
10/26/2023    Reason for consultation:   Evaluation of ascending aortic aneurysm    Chief Complaint     History of Present Illness:       Dear Colleagues,  It was nice to see Wilfredo Mcnamara in consultation at your request. He is a 82 y.o. male with hyperlipidemia, diabetes, coronary artery disease status post recent non-ST elevation MI and stenting of the circumflex, hypertension, sleep apnea and overweight who was recently evaluated with an echocardiogram during admission and was found to have mild dilatation of the ascending aorta and root. He denies any aneurysm related signs or symptoms. His chest CT showed the ascending aorta 4.6 cm at the root, sinotubular junction at 3.4 cm, 4.2 cm at the level of the main pulmonary artery and 3.2 cm in the mid aortic arch to a 2.8 cm in the diaphragmatic zuri.  He has no family history of aneurysms, dissections or connective tissue disorders.  He had a 2D echo cardiogram that showed an ejection fraction of 55% and no significant aortic or mitral valve disease and mild dilatation of the aortic root.     Patient Active Problem List   Diagnosis    Polyosteoarthritis    Hyperlipidemia    Type 2 diabetes mellitus    NSTEMI (non-ST elevated myocardial infarction)    Obstructive sleep apnea syndrome    Essential hypertension    Overweight (BMI 25.0-29.9)    Coronary artery disease involving native coronary artery of native heart with unstable angina pectoris    Cellulitis, unspecified cellulitis site    Aneurysm of ascending aorta without rupture       Past Medical History:   Diagnosis Date    Diabetes     Essential hypertension     Hyperlipidemia 03/05/2013    Obstructive sleep apnea syndrome 05/02/2022       Past Surgical History:   Procedure Laterality Date    CARDIAC CATHETERIZATION  12/08/2022    CARDIAC CATHETERIZATION Left 12/8/2022    Procedure: Left Heart Cath and coronary angiogram;  Surgeon: Haris Estrada MD;  Location: [facility] CATH INVASIVE LOCATION;  Service:  Cardiovascular;  Laterality: Left;    CARDIAC CATHETERIZATION N/A 12/8/2022    Procedure: Percutaneous Coronary Intervention;  Surgeon: Haris Estrada MD;  Location: [facility] CATH INVASIVE LOCATION;  Service: Cardiovascular;  Laterality: N/A;    CORONARY STENT PLACEMENT  12/07/2022    Status post PCI of left circumflex, the culprit vessel with IVUS guidance    INTERVENTIONAL RADIOLOGY PROCEDURE N/A 12/8/2022    Procedure: Intravascular Ultrasound;  Surgeon: Haris Estrada MD;  Location: [facility] CATH INVASIVE LOCATION;  Service: Cardiovascular;  Laterality: N/A;       No Known Allergies      Current Outpatient Medications:     ascorbic acid (VITAMIN C) 500 MG tablet, Take 1 tablet by mouth Daily., Disp: , Rfl:     aspirin 81 MG EC tablet, Take 1 tablet by mouth Daily., Disp: 30 tablet, Rfl: 0    atorvastatin (LIPITOR) 40 MG tablet, Take 1 tablet by mouth Every Night., Disp: 90 tablet, Rfl: 3    carvedilol (COREG) 6.25 MG tablet, Take 0.5 tablets by mouth 2 (Two) Times a Day., Disp: , Rfl:     CHOLECALCIFEROL PO, Take 2,000 Units by mouth Daily., Disp: , Rfl:     clopidogrel (PLAVIX) 75 MG tablet, Take 1 tablet by mouth Daily., Disp: 30 tablet, Rfl: 3    FREESTYLE LITE test strip, 1 each by Other route Daily. use to test blood sugar once daily, Disp: , Rfl:     gabapentin (NEURONTIN) 300 MG capsule, Take 2 capsules by mouth 2 (Two) Times a Day., Disp: , Rfl:     glimepiride (AMARYL) 2 MG tablet, Take 1 tablet by mouth Daily., Disp: , Rfl:     losartan (COZAAR) 25 MG tablet, Take 1 tablet by mouth Daily., Disp: , Rfl:     metFORMIN (GLUCOPHAGE) 500 MG tablet, Take 2 tablets by mouth 2 (Two) Times a Day With Meals., Disp: , Rfl:     multivitamin with minerals tablet tablet, Take 1 tablet by mouth Daily., Disp: , Rfl:     nitroglycerin (NITROSTAT) 0.4 MG SL tablet, Place 1 tablet under the tongue Every 5 (Five) Minutes As Needed for Chest Pain (Only if SBP Greater Than 100). Take no more than 3 doses in 15 minutes., Disp: 30  tablet, Rfl: 12    vitamin B-12 (CYANOCOBALAMIN) 1000 MCG tablet, Take 2.5 tablets by mouth Daily., Disp: , Rfl:     Social History     Socioeconomic History    Marital status:    Tobacco Use    Smoking status: Never     Passive exposure: Never    Smokeless tobacco: Never   Vaping Use    Vaping Use: Never used   Substance and Sexual Activity    Alcohol use: Not Currently    Drug use: Never    Sexual activity: Defer       Family History   Family history unknown: Yes     Review of Systems  As HPI, otherwise noncontributory    Physical Exam:    Vital Signs:  Weight: 104 kg (230 lb)   Body mass index is 28.75 kg/m².  Temp: 97.3 °F (36.3 °C)   Heart Rate: 70   BP: 140/84     Constitutional:       Appearance: Well-developed.   Eyes:      Conjunctiva/sclera: Conjunctivae normal.      Pupils: Pupils are equal, round, and reactive to light.   HENT:      Head: Normocephalic and atraumatic.      Nose: Nose normal.   Neck:      Thyroid: No thyromegaly.      Vascular: No JVD.      Lymphadenopathy: No cervical adenopathy.   Pulmonary:      Effort: Pulmonary effort is normal.      Breath sounds: Normal breath sounds. No rales.   Cardiovascular:      Normal rate. Regular rhythm.      Murmurs: There is no murmur.      No gallop.    Pulses:     Intact distal pulses. No decreased pulses.   Edema:     Peripheral edema absent.   Abdominal:      General: Bowel sounds are normal. There is no distension.      Palpations: Abdomen is soft. There is no abdominal mass.      Tenderness: There is no abdominal tenderness.   Musculoskeletal: Normal range of motion.         General: No tenderness or deformity.      Cervical back: Normal range of motion and neck supple. Skin:     General: Skin is warm and dry.      Findings: No erythema or rash.   Neurological:      Mental Status: Alert and oriented to person, place, and time.      Deep Tendon Reflexes: Reflexes are normal and symmetric.   Psychiatric:         Behavior: Behavior normal.          Relevant studies (other than HPI)        Assessment:     Problems Addressed this Visit          Cardiac and Vasculature    Essential hypertension - Primary (Chronic)    NSTEMI (non-ST elevated myocardial infarction)    Coronary artery disease involving native coronary artery of native heart with unstable angina pectoris       Endocrine and Metabolic    Type 2 diabetes mellitus (Chronic)       Sleep    Obstructive sleep apnea syndrome (Chronic)       Other    Aneurysm of ascending aorta without rupture     Diagnoses         Codes Comments    Essential hypertension    -  Primary ICD-10-CM: I10  ICD-9-CM: 401.9     NSTEMI (non-ST elevated myocardial infarction)     ICD-10-CM: I21.4  ICD-9-CM: 410.70     Coronary artery disease involving native coronary artery of native heart with unstable angina pectoris     ICD-10-CM: I25.110  ICD-9-CM: 414.01, 411.1     Type 2 diabetes mellitus without complication, without long-term current use of insulin     ICD-10-CM: E11.9  ICD-9-CM: 250.00     Obstructive sleep apnea syndrome     ICD-10-CM: G47.33  ICD-9-CM: 327.23     Aneurysm of ascending aorta without rupture     ICD-10-CM: I71.21  ICD-9-CM: 441.2                 Assessment/recommendation/Plan:     Incidental finding of 4.6 cm aortic root dilatation without dissection or ulceration.  He had recent treatment of coronary artery disease with a stent.  He is clinically stable.  Discussed with the patient the natural history of aneurysm disease and the guidelines for intervention.  I discussed with him the importance of longitudinal follow-up for surveillance with a chest CT in 1 year and follow-up in our thoracic aortic surgical clinic.  He verbalized understanding and treatment  2.   Hypertension borderline control.  His blood pressure is 150 mmHg systolic.  Discussed with him the goal of a systolic pressure less than 135 mmHg.  I instructed the patient to follow a low-salt diet  3.  Coronary artery disease status post  recent stent.  Chest pain-free.  Followed by the cardiology team    Thank you for allowing me to participate in his care.    Regards,    Hugh Sharp M.D.    Been over 45 minutes before, during and after the office visit in reviewing the records, examining the patient, reviewing interpreting myself the echo Carton, the chest CTA, the cardiac cath, discussing the findings and options with the patient, discussing the natural history of aneurysm disease and the guidelines for intervention follow-up, discussing the importance of low blood pressure and spent time in documenting in the electronic record

## 2023-12-03 NOTE — PROGRESS NOTES
Encounter Date:12/08/2023        Patient ID: Wilfredo Mcnamara is a 83 y.o. male.      Chief Complaint:      History of Present Illness  Wilfredo Mcnamara is a 83 y.o. male with hypertension, hyperlipidemia, diabetes with peripheral neuropathy who presented to the hospital with complaints of chest pain.  He was diagnosed with non-ST elevation MI with changes in his ECG and rising troponins..  He was taken to the Cath Lab urgently and was noted to have 99% thrombotic lesion in his mid left circumflex artery which was treated with placement of a drug-eluting stent on 12/8/2022.   3.5 x 23 mm Xience ruperto point stent was placed and was postdilated with 4.5 mm balloon.  He was started on dual antiplatelet therapy.  Of note there was 40 to 50% diffuse disease in the LAD and 70% lesion in the small PLV.  An echocardiogram showed preserved LV function, grade 1 diastolic dysfunction with mild dilatation of aortic root 4.3 cm.     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  He denies any chest pain or shortness of breath.  He stopped taking Coreg due to side effects.  He is unsure of what the side effects were.    The following portions of the patient's history were reviewed and updated as appropriate: allergies, current medications, past family history, past medical history, past social history, past surgical history, and problem list.    Review of Systems   Constitutional: Negative for malaise/fatigue.   Cardiovascular:  Negative for chest pain, dyspnea on exertion, leg swelling and palpitations.   Respiratory:  Negative for cough and shortness of breath.    Gastrointestinal:  Negative for abdominal pain, nausea and vomiting.   Neurological:  Positive for dizziness and light-headedness. Negative for focal weakness, headaches and numbness.   All other systems reviewed and are negative.        Current Outpatient Medications:     ascorbic acid (VITAMIN C) 500 MG tablet, Take 1 tablet by mouth  Daily., Disp: , Rfl:     aspirin 81 MG EC tablet, Take 1 tablet by mouth Daily., Disp: 30 tablet, Rfl: 0    atorvastatin (LIPITOR) 40 MG tablet, Take 1 tablet by mouth Every Night., Disp: 90 tablet, Rfl: 3    CHOLECALCIFEROL PO, Take 2,000 Units by mouth Daily., Disp: , Rfl:     clopidogrel (PLAVIX) 75 MG tablet, Take 1 tablet by mouth Daily., Disp: 30 tablet, Rfl: 3    FREESTYLE LITE test strip, 1 each by Other route Daily. use to test blood sugar once daily, Disp: , Rfl:     gabapentin (NEURONTIN) 300 MG capsule, Take 2 capsules by mouth 2 (Two) Times a Day., Disp: , Rfl:     glimepiride (AMARYL) 2 MG tablet, Take 1 tablet by mouth Daily., Disp: , Rfl:     losartan (COZAAR) 25 MG tablet, Take 1 tablet by mouth Daily., Disp: , Rfl:     metFORMIN (GLUCOPHAGE) 500 MG tablet, Take 2 tablets by mouth 2 (Two) Times a Day With Meals., Disp: , Rfl:     multivitamin with minerals tablet tablet, Take 1 tablet by mouth Daily., Disp: , Rfl:     nitroglycerin (NITROSTAT) 0.4 MG SL tablet, Place 1 tablet under the tongue Every 5 (Five) Minutes As Needed for Chest Pain (Only if SBP Greater Than 100). Take no more than 3 doses in 15 minutes., Disp: 30 tablet, Rfl: 12    vitamin B-12 (CYANOCOBALAMIN) 1000 MCG tablet, Take 2.5 tablets by mouth Daily., Disp: , Rfl:     carvedilol (COREG) 6.25 MG tablet, Take 0.5 tablets by mouth 2 (Two) Times a Day. (Patient not taking: Reported on 12/8/2023), Disp: , Rfl:     Current outpatient and discharge medications have been reconciled for the patient.  Reviewed by: Haris Estrada MD       No Known Allergies    Family History   Family history unknown: Yes       Past Surgical History:   Procedure Laterality Date    CARDIAC CATHETERIZATION  12/08/2022    CARDIAC CATHETERIZATION Left 12/8/2022    Procedure: Left Heart Cath and coronary angiogram;  Surgeon: Haris Estrada MD;  Location: [facility] CATH INVASIVE LOCATION;  Service: Cardiovascular;  Laterality: Left;    CARDIAC CATHETERIZATION N/A  "12/8/2022    Procedure: Percutaneous Coronary Intervention;  Surgeon: Haris Estrada MD;  Location: [facility] CATH INVASIVE LOCATION;  Service: Cardiovascular;  Laterality: N/A;    CORONARY STENT PLACEMENT  12/07/2022    Status post PCI of left circumflex, the culprit vessel with IVUS guidance    INTERVENTIONAL RADIOLOGY PROCEDURE N/A 12/8/2022    Procedure: Intravascular Ultrasound;  Surgeon: Haris Estrada MD;  Location: [facility] CATH INVASIVE LOCATION;  Service: Cardiovascular;  Laterality: N/A;       Past Medical History:   Diagnosis Date    Diabetes     Essential hypertension     Hyperlipidemia 03/05/2013    Obstructive sleep apnea syndrome 05/02/2022       Family History   Family history unknown: Yes       Social History     Socioeconomic History    Marital status:    Tobacco Use    Smoking status: Never     Passive exposure: Never    Smokeless tobacco: Never   Vaping Use    Vaping Use: Never used   Substance and Sexual Activity    Alcohol use: Not Currently    Drug use: Never    Sexual activity: Defer               Objective:       Physical Exam    /72 (BP Location: Right arm, Patient Position: Sitting, Cuff Size: Adult)   Pulse 62   Ht 167.6 cm (66\")   Wt 109 kg (239 lb 3.2 oz)   SpO2 97%   BMI 38.61 kg/m²   The patient is alert, oriented and in no distress.    Vital signs as noted above.    Head and neck revealed no carotid bruits or jugular venous distension.  No thyromegaly or lymphadenopathy is present.    Lungs clear.  No wheezing.  Breath sounds are normal bilaterally.    Heart normal first and second heart sounds.  No murmur..  No pericardial rub is present.  No gallop is present.    Abdomen soft and nontender.  No organomegaly is present.    Extremities revealed good peripheral pulses without any pedal edema.    Skin warm and dry.    Musculoskeletal system is grossly normal.    CNS grossly normal.           Diagnosis Plan   1. Essential hypertension        2. Mixed hyperlipidemia        3. " NSTEMI (non-ST elevated myocardial infarction)        4. Coronary artery disease involving native coronary artery of native heart with unstable angina pectoris        5. Type 2 diabetes mellitus without complication, without long-term current use of insulin        6. Obstructive sleep apnea syndrome        7. Overweight (BMI 25.0-29.9)        8. Aortic root aneurysm        9. Primary osteoarthritis of left hip        10. Pre-operative laboratory examination        LAB RESULTS (LAST 7 DAYS)    CBC        BMP        CMP         BNP        TROPONIN        CoAg        Creatinine Clearance  CrCl cannot be calculated (Patient's most recent lab result is older than the maximum 30 days allowed.).    ABG        Radiology  No radiology results for the last day    EKG  Procedures    Stress test      Echocardiogram  Results for orders placed during the hospital encounter of 12/07/22    Adult Transthoracic Echo Complete W/ Cont if Necessary Per Protocol    Interpretation Summary    Left ventricular ejection fraction appears to be 51 - 55%.    Left ventricular diastolic function is consistent with (grade I) impaired relaxation.    Mild dilation of the aortic root is present.  4.3 cm    No significant valvular abnormalities      Cardiac catheterization  Results for orders placed during the hospital encounter of 12/07/22    Cardiac Catheterization/Vascular Study    Narrative  OPERATORS:  1. Haris Estrada M.D., Attending Cardiologist      PROCEDURE PERFORMED.  Ultrasound guided vascular access  Left heart catheterization  Coronary Angiogram 32126  Percutaneous coronary intervention with drug-eluting stent placement to LAD 65079  Intravascular ultrasound LAD 26880  Moderate Sedation 50 minutes    INDICATIONS FOR PROCEDURE.  82-year-old man presented with acute non-ST elevation myocardial infarction.  After discussing the risk and benefit of cardiac catheterization he was brought into the Cath Lab for urgent procedure    PROCEDURE IN  DETAIL.  Informed consent was obtained from the patient after explaining the risks, benefits, and alternative options of the procedure. After obtaining informed consent, the patient was brought to the cath lab and was prepped in a sterile fashion. Lidocaine 1% was used for local anesthesia into the right radial access site. The right radial artery was accessed under direct ultrasound visualization  with an angiocath needle via modified Seldinger technique. A 6F slender sheath was inserted successfully. Afterwards, 5F JR4  was advanced over a wire into the ascending aorta and used to cross the AV and obtain LV pressures.  AV gradient obtained via pullback technique. JR4 and JL3.5 diagnostic catheters were used to engage the ostia of the RCA and LM respectively. Images of the right and left coronary systems were obtained.    HEMODYNAMICS.  LV: 132/10, 17 mmHg  AO: 133/74, 100 mmHg    No significant gradient across aortic valve during pullback of JR4 catheter.    MEDICATIONS.  1. Versed  2. Fentanyl  3. Verapamil  4. Nitro  5. Heparin    FINDINGS.    Coronary Angiogram.    1. Left main. Left main is a large-caliber vessel which gives rise to the Left Anterior Descending and the Left circumflex.  Left main has no significant disease    2. Left Anterior Descending Artery. LAD is a large vessel which gives rise to several septal perforators and several diagonal branches.  In the midsegment it bifurcates into a diagonal and dual LAD system.  This has diffuse disease 50% throughout    3. Left Circumflex. The LCx is a meidum caliber which gives rise to marginals.  There is a 99% hazy looking lesion in the mid left circumflex with MARK II flow.    4. Right Coronary Artery. The RCA is a dominant vessel which gives rise to several small caliber branches.  PLV has 70% lesion in a small vessel 2 mm.  Overall RCA has luminal irregularities    Percutaneous coronary intervention  10,000 units of heparin were administered and ACT of  more than 250 was documented.  Left main ostium was engaged with 6 [place] XB 3.5 guide catheter.  0.014 run-through wire was then advanced into the distal left circumflex artery.  Lesion in the mid circumflex was then predilated with 2.5 x 12 noncompliant balloon.  Over the run-through wire I then advanced intravascular ultrasound catheter and obtained measurements.  Distal left circumflex measured 4 mm in dimension, proximal left circumflex measured 4.5 mm in dimension.  IVUS also confirmed 80% thrombotic lesion in the mid left circumflex.  I then placed 3.5 x 23 mm Xience ruperto point stent at 14 [place].  This was postdilated with 4.5 x 15 mm noncompliant balloon at 16 ash[place].  Final angiography showed 0% stenosis and MARK-3 flow.  Final IVUS showed fully expanded stents with excellent apposition.    All the catheters were exchanged over a wire and subsequently removed. The patient tolerated the procedure well without any complications. The pictures were reviewed at the end of the procedure. TR band applied to right wrist for hemostasis and inflated with 15 cc of air. No complications were encountered.    IMPRESSIONS.  1.  One-vessel obstructive coronary artery disease  2.  Status post PCI of left circumflex, the culprit vessel with IVUS guidance    RECOMMENDATIONS.  1.  Start dual antiplatelet therapy  2. Exercise and lifestyle modifications recommended.  3.  Risk factors modification  4.  Cardiac rehab referral          Assessment and Plan       Diagnoses and all orders for this visit:    1. Essential hypertension (Primary)    2. Mixed hyperlipidemia    3. NSTEMI (non-ST elevated myocardial infarction)    4. Coronary artery disease involving native coronary artery of native heart with unstable angina pectoris    5. Type 2 diabetes mellitus without complication, without long-term current use of insulin    6. Obstructive sleep apnea syndrome    7. Overweight (BMI 25.0-29.9)    8. Aortic root aneurysm    9. Primary  osteoarthritis of left hip    10. Pre-operative laboratory examination         1. NSTEMI (non-ST elevated myocardial infarction) ([facility]) (Primary)  Status post PCI to left circumflex artery.  3.5 x 23 mm Xience ruperto point stent at 14 ash[place].  This was postdilated with 4.5mm balloon.  Continue dual antiplatelet therapy, high intensity statin and beta-blocker  He completed cardiac rehab and has now resumed his workouts at [facility]  Echocardiogram shows EF of 51 to 55%, grade 1 diastolic dysfunction  Encouraged him to continue to stay active.  2. Mixed hyperlipidemia  Continue high intensity statin.  His LDL is 132, total cholesterol 201, HDL 36, triglyceride 183.  Repeat lipid panel.   3. Essential hypertension  Continue losartan  Blood pressure and heart rate are well-controlled.  Heart rate is in the 60s.  He is no longer taking Coreg due to side effects.  Unclear what the side effects were. ?  Bradycardia  4. Coronary artery disease involving native coronary artery of native heart with unstable angina pectoris ([facility])  Continue risk factors modification  5. Type 2 diabetes mellitus without complication, without long-term current use of insulin ([facility])  Currently on glimepiride.   His HbA1c 6.9  Continue losartan for hypertension  6. Obstructive sleep apnea syndrome  Using CPAP  7. Overweight (BMI 25.0-29.9)  Discussed continuing exercise, diet and weight loss  8. Aortic root aneurysm  Aortic root is 4.3 cm  No longer taking Coreg due to side effects.  Unsure what the side effects were  Blood pressure and heart rate are well-controlled  October 2023 CT showed Aneurysmal dilatation of ascending aorta measuring 4.6 cm at the sinus of Valsalva and 4.2 cm at the level of the main pulmonary artery.   Continue to focus on blood pressure and heart rate control.

## 2023-12-08 ENCOUNTER — OFFICE VISIT (OUTPATIENT)
Dept: CARDIOLOGY | Facility: CLINIC | Age: 83
End: 2023-12-08
Payer: MEDICARE

## 2023-12-08 VITALS
OXYGEN SATURATION: 97 % | SYSTOLIC BLOOD PRESSURE: 125 MMHG | HEIGHT: 66 IN | WEIGHT: 239.2 LBS | BODY MASS INDEX: 38.44 KG/M2 | HEART RATE: 62 BPM | DIASTOLIC BLOOD PRESSURE: 72 MMHG

## 2023-12-08 DIAGNOSIS — E66.3 OVERWEIGHT (BMI 25.0-29.9): ICD-10-CM

## 2023-12-08 DIAGNOSIS — M16.12 PRIMARY OSTEOARTHRITIS OF LEFT HIP: ICD-10-CM

## 2023-12-08 DIAGNOSIS — E78.2 MIXED HYPERLIPIDEMIA: ICD-10-CM

## 2023-12-08 DIAGNOSIS — E11.9 TYPE 2 DIABETES MELLITUS WITHOUT COMPLICATION, WITHOUT LONG-TERM CURRENT USE OF INSULIN: ICD-10-CM

## 2023-12-08 DIAGNOSIS — I10 ESSENTIAL HYPERTENSION: Primary | ICD-10-CM

## 2023-12-08 DIAGNOSIS — I71.21 AORTIC ROOT ANEURYSM: ICD-10-CM

## 2023-12-08 DIAGNOSIS — Z01.812 PRE-OPERATIVE LABORATORY EXAMINATION: ICD-10-CM

## 2023-12-08 DIAGNOSIS — I25.110 CORONARY ARTERY DISEASE INVOLVING NATIVE CORONARY ARTERY OF NATIVE HEART WITH UNSTABLE ANGINA PECTORIS: ICD-10-CM

## 2023-12-08 DIAGNOSIS — G47.33 OBSTRUCTIVE SLEEP APNEA SYNDROME: ICD-10-CM

## 2023-12-08 DIAGNOSIS — I21.4 NSTEMI (NON-ST ELEVATED MYOCARDIAL INFARCTION): ICD-10-CM

## 2024-04-28 ENCOUNTER — HOSPITAL ENCOUNTER (EMERGENCY)
Facility: HOSPITAL | Age: 84
Discharge: HOME | End: 2024-04-28
Attending: EMERGENCY MEDICINE | Admitting: EMERGENCY MEDICINE
Payer: MEDICARE

## 2024-04-28 ENCOUNTER — APPOINTMENT (OUTPATIENT)
Dept: GENERAL RADIOLOGY | Facility: HOSPITAL | Age: 84
End: 2024-04-28
Payer: MEDICARE

## 2024-04-28 VITALS
TEMPERATURE: 98.6 F | WEIGHT: 242.2 LBS | HEART RATE: 78 BPM | DIASTOLIC BLOOD PRESSURE: 65 MMHG | BODY MASS INDEX: 30.11 KG/M2 | SYSTOLIC BLOOD PRESSURE: 147 MMHG | HEIGHT: 75 IN | RESPIRATION RATE: 20 BRPM | OXYGEN SATURATION: 98 %

## 2024-04-28 DIAGNOSIS — L03.116 CELLULITIS OF LEFT LOWER EXTREMITY: ICD-10-CM

## 2024-04-28 DIAGNOSIS — M70.42 PREPATELLAR BURSITIS OF LEFT KNEE: Primary | ICD-10-CM

## 2024-04-28 LAB
ALBUMIN SERPL-MCNC: 4.1 G/DL (ref 3.5–5.2)
ALBUMIN/GLOB SERPL: 1.5 G/DL
ALP SERPL-CCNC: 78 U/L (ref 39–117)
ALT SERPL W P-5'-P-CCNC: 18 U/L (ref 1–41)
ANION GAP SERPL CALCULATED.3IONS-SCNC: 8.4 MMOL/L (ref 5–15)
AST SERPL-CCNC: 17 U/L (ref 1–40)
BASOPHILS # BLD AUTO: 0.03 10*3/MM3 (ref 0–0.2)
BASOPHILS NFR BLD AUTO: 0.4 % (ref 0–1.5)
BILIRUB SERPL-MCNC: 0.7 MG/DL (ref 0–1.2)
BUN SERPL-MCNC: 30 MG/DL (ref 8–23)
BUN/CREAT SERPL: 20.3 (ref 7–25)
CALCIUM SPEC-SCNC: 9.2 MG/DL (ref 8.6–10.5)
CHLORIDE SERPL-SCNC: 101 MMOL/L (ref 98–107)
CO2 SERPL-SCNC: 25.6 MMOL/L (ref 22–29)
CREAT SERPL-MCNC: 1.48 MG/DL (ref 0.76–1.27)
DEPRECATED RDW RBC AUTO: 48.4 FL (ref 37–54)
EGFRCR SERPLBLD CKD-EPI 2021: 47.2 ML/MIN/1.73
EOSINOPHIL # BLD AUTO: 0.08 10*3/MM3 (ref 0–0.4)
EOSINOPHIL NFR BLD AUTO: 1 % (ref 0.3–6.2)
ERYTHROCYTE [DISTWIDTH] IN BLOOD BY AUTOMATED COUNT: 13.8 % (ref 12.3–15.4)
GLOBULIN UR ELPH-MCNC: 2.7 GM/DL
GLUCOSE SERPL-MCNC: 75 MG/DL (ref 65–99)
HCT VFR BLD AUTO: 40.1 % (ref 37.5–51)
HGB BLD-MCNC: 12.9 G/DL (ref 13–17.7)
IMM GRANULOCYTES # BLD AUTO: 0.02 10*3/MM3 (ref 0–0.05)
IMM GRANULOCYTES NFR BLD AUTO: 0.3 % (ref 0–0.5)
LYMPHOCYTES # BLD AUTO: 1.26 10*3/MM3 (ref 0.7–3.1)
LYMPHOCYTES NFR BLD AUTO: 16.5 % (ref 19.6–45.3)
MCH RBC QN AUTO: 30.6 PG (ref 26.6–33)
MCHC RBC AUTO-ENTMCNC: 32.2 G/DL (ref 31.5–35.7)
MCV RBC AUTO: 95.2 FL (ref 79–97)
MONOCYTES # BLD AUTO: 1.3 10*3/MM3 (ref 0.1–0.9)
MONOCYTES NFR BLD AUTO: 17 % (ref 5–12)
NEUTROPHILS NFR BLD AUTO: 4.94 10*3/MM3 (ref 1.7–7)
NEUTROPHILS NFR BLD AUTO: 64.8 % (ref 42.7–76)
PLATELET # BLD AUTO: 189 10*3/MM3 (ref 140–450)
PMV BLD AUTO: 10 FL (ref 6–12)
POTASSIUM SERPL-SCNC: 4.3 MMOL/L (ref 3.5–5.2)
PROT SERPL-MCNC: 6.8 G/DL (ref 6–8.5)
RBC # BLD AUTO: 4.21 10*6/MM3 (ref 4.14–5.8)
SODIUM SERPL-SCNC: 135 MMOL/L (ref 136–145)
WBC NRBC COR # BLD AUTO: 7.63 10*3/MM3 (ref 3.4–10.8)

## 2024-04-28 PROCEDURE — 25010000002 CEFAZOLIN PER 500 MG: Performed by: PHYSICIAN ASSISTANT

## 2024-04-28 PROCEDURE — 73562 X-RAY EXAM OF KNEE 3: CPT

## 2024-04-28 PROCEDURE — 99283 EMERGENCY DEPT VISIT LOW MDM: CPT

## 2024-04-28 PROCEDURE — 96365 THER/PROPH/DIAG IV INF INIT: CPT

## 2024-04-28 PROCEDURE — 85025 COMPLETE CBC W/AUTO DIFF WBC: CPT | Performed by: PHYSICIAN ASSISTANT

## 2024-04-28 PROCEDURE — 80053 COMPREHEN METABOLIC PANEL: CPT | Performed by: PHYSICIAN ASSISTANT

## 2024-04-28 RX ORDER — CEPHALEXIN 500 MG/1
500 CAPSULE ORAL 3 TIMES DAILY
Qty: 30 CAPSULE | Refills: 0 | Status: SHIPPED | OUTPATIENT
Start: 2024-04-28

## 2024-04-28 RX ORDER — CEPHALEXIN 500 MG/1
500 CAPSULE ORAL ONCE
Status: DISCONTINUED | OUTPATIENT
Start: 2024-04-28 | End: 2024-04-28

## 2024-04-28 RX ORDER — DOXYCYCLINE 100 MG/1
100 CAPSULE ORAL 2 TIMES DAILY
Qty: 20 CAPSULE | Refills: 0 | Status: SHIPPED | OUTPATIENT
Start: 2024-04-28

## 2024-04-28 RX ADMIN — CEFAZOLIN 1000 MG: 1 INJECTION, POWDER, FOR SOLUTION INTRAMUSCULAR; INTRAVENOUS at 17:04

## 2024-04-28 NOTE — FSED PROVIDER NOTE
EMERGENCY DEPARTMENT ENCOUNTER    Room Number:  12/12  Date seen:  4/28/2024  Time seen: 16:14 EDT  PCP: Nigel Tinoco MD  Historian: Patient    Discussed/obtained information from independent historians: N/A    HPI:  Chief complaint: Left knee pain     A complete HPI/ROS/PMH/PSH/SH/FH are unobtainable due to: N/A  Context:Wilfredo Mcnamara is a 83 y.o. male with significant past medical history of type 2 diabetes, hyperlipidemia, non-STEMI, hypertension, aneurysm of the ascending aorta who presents to the ED with c/o left knee pain SP gardening and working with gravel 2 days ago.  Patient states he went to bed the evening after working and had minimal pain.  When he woke up the next day the pain was a little more intense.  Over the course of the past 24 hours the pain is continued to worsen.  Pain is said to be just superior to the patella and lateral.  Pain is exacerbated by extension and flexion of the left knee, particularly with flexion.  Patient denies swelling, associated redness or skin rash.  He denies history of gout.  He denies left lower extremity swelling.  He denies fever or chills.  There was no definite trauma.  He is here for further evaluation.  External (non-ED) record review: Patient followed by Dr. Sharp/cardiothoracic surgery.  Patient last seen 10/26/2023.  He is being followed for his ascending aortic aneurysm.  This appeared to have been an incidental finding on CT scan.  At the time of this visit the ascending aneurysm was 4.6 cm of aortic root dilation without dissection or ulceration.  He was determined to be clinically stable.  Patient was asymptomatic.  He was to follow-up in 1 years time for further surveillance.  Blood pressure goal was 135 mmHg systolically.  At the time of the visit his blood pressure was 150 mmHg systolic.  A lower salt diet was discussed.    Chronic or social conditions impacting care:    ALLERGIES  Patient has no known allergies.    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    Hyperlipidemia 03/05/2013    Type 2 diabetes mellitus 02/14/2013    NSTEMI (non-ST elevated myocardial infarction) 12/07/2022    Obstructive sleep apnea syndrome 05/02/2022    Essential hypertension     Overweight (BMI 25.0-29.9) 12/09/2022    Coronary artery disease involving native coronary artery of native heart with unstable angina pectoris 12/07/2022    Cellulitis, unspecified cellulitis site 05/14/2023    Aneurysm of ascending aorta without rupture 10/26/2023     Resolved Ambulatory Problems     Diagnosis Date Noted    No Resolved Ambulatory Problems     Past Medical History:   Diagnosis Date    Diabetes        PAST SURGICAL HISTORY  Past Surgical History:   Procedure Laterality Date    CARDIAC CATHETERIZATION  12/08/2022    CARDIAC CATHETERIZATION Left 12/8/2022    Procedure: Left Heart Cath and coronary angiogram;  Surgeon: Haris Estrada MD;  Location: [facility] CATH INVASIVE LOCATION;  Service: Cardiovascular;  Laterality: Left;    CARDIAC CATHETERIZATION N/A 12/8/2022    Procedure: Percutaneous Coronary Intervention;  Surgeon: Haris Estrada MD;  Location: [facility] CATH INVASIVE LOCATION;  Service: Cardiovascular;  Laterality: N/A;    CORONARY STENT PLACEMENT  12/07/2022    Status post PCI of left circumflex, the culprit vessel with IVUS guidance    INTERVENTIONAL RADIOLOGY PROCEDURE N/A 12/8/2022    Procedure: Intravascular Ultrasound;  Surgeon: Haris Estrada MD;  Location: [facility] CATH INVASIVE LOCATION;  Service: Cardiovascular;  Laterality: N/A;       FAMILY HISTORY  Family History   Family history unknown: Yes       SOCIAL HISTORY  Social History     Socioeconomic History    Marital status:    Tobacco Use    Smoking status: Never     Passive exposure: Never    Smokeless tobacco: Never   Vaping Use    Vaping status: Never Used   Substance and Sexual Activity    Alcohol use: Not Currently    Drug use: Never     Sexual activity: Defer       REVIEW OF SYSTEMS  Review of Systems    All systems reviewed and negative except for those discussed in HPI.     PHYSICAL EXAM    I have reviewed the triage vital signs and nursing notes.  Vitals:    04/28/24 1541   BP: 147/65   Pulse: 78   Resp: 20   Temp: 98.6 °F (37 °C)   SpO2: 98%     Physical Exam    GENERAL: WDWN male, not distressed  HENT: nares patent  EYES: no scleral icterus  NECK: no ROM limitations  CV: regular rhythm, regular rate DP and PT pulse left lower extremity +1  RESPIRATORY: normal effort  ABDOMEN: soft  : deferred  MUSCULOSKELETAL: Left knee: TTP just superior and lateral to the patella pole in the vicinity of the lateral quadriceps insertion point.  No obvious divot/deficit, ecchymosis.  No joint effusion.  Pain worsened with knee flexion.  No left lower extremity swelling.  Compartments of the left lower extremity soft.  The prepatellar bursa does appear to be Erithmatic and warm to the touch.  There does appear to be very minimal swelling.  The joint itself is not painful to move.  NEURO: alert, moves all extremities, follows commands  SKIN: warm, dry, no skin rash    LAB RESULTS  Recent Results (from the past 24 hour(s))   Comprehensive Metabolic Panel    Collection Time: 04/28/24  4:42 PM    Specimen: Blood   Result Value Ref Range    Glucose 75 65 - 99 mg/dL    BUN 30 (H) 8 - 23 mg/dL    Creatinine 1.48 (H) 0.76 - 1.27 mg/dL    Sodium 135 (L) 136 - 145 mmol/L    Potassium 4.3 3.5 - 5.2 mmol/L    Chloride 101 98 - 107 mmol/L    CO2 25.6 22.0 - 29.0 mmol/L    Calcium 9.2 8.6 - 10.5 mg/dL    Total Protein 6.8 6.0 - 8.5 g/dL    Albumin 4.1 3.5 - 5.2 g/dL    ALT (SGPT) 18 1 - 41 U/L    AST (SGOT) 17 1 - 40 U/L    Alkaline Phosphatase 78 39 - 117 U/L    Total Bilirubin 0.7 0.0 - 1.2 mg/dL    Globulin 2.7 gm/dL    A/G Ratio 1.5 g/dL    BUN/Creatinine Ratio 20.3 7.0 - 25.0    Anion Gap 8.4 5.0 - 15.0 mmol/L    eGFR 47.2 (L) >60.0 mL/min/1.73   CBC Auto  Differential    Collection Time: 04/28/24  4:42 PM    Specimen: Blood   Result Value Ref Range    WBC 7.63 3.40 - 10.80 10*3/mm3    RBC 4.21 4.14 - 5.80 10*6/mm3    Hemoglobin 12.9 (L) 13.0 - 17.7 g/dL    Hematocrit 40.1 37.5 - 51.0 %    MCV 95.2 79.0 - 97.0 fL    MCH 30.6 26.6 - 33.0 pg    MCHC 32.2 31.5 - 35.7 g/dL    RDW 13.8 12.3 - 15.4 %    RDW-SD 48.4 37.0 - 54.0 fl    MPV 10.0 6.0 - 12.0 fL    Platelets 189 140 - 450 10*3/mm3    Neutrophil % 64.8 42.7 - 76.0 %    Lymphocyte % 16.5 (L) 19.6 - 45.3 %    Monocyte % 17.0 (H) 5.0 - 12.0 %    Eosinophil % 1.0 0.3 - 6.2 %    Basophil % 0.4 0.0 - 1.5 %    Immature Grans % 0.3 0.0 - 0.5 %    Neutrophils, Absolute 4.94 1.70 - 7.00 10*3/mm3    Lymphocytes, Absolute 1.26 0.70 - 3.10 10*3/mm3    Monocytes, Absolute 1.30 (H) 0.10 - 0.90 10*3/mm3    Eosinophils, Absolute 0.08 0.00 - 0.40 10*3/mm3    Basophils, Absolute 0.03 0.00 - 0.20 10*3/mm3    Immature Grans, Absolute 0.02 0.00 - 0.05 10*3/mm3       Ordered the above labs and independently interpreted results.  My findings will be discussed in the ED course or medical decision making section below    RADIOLOGY RESULTS  XR Knee 3 View Left    Result Date: 4/28/2024  XR KNEE 3 VW LEFT Date of Exam: 4/28/2024 4:05 PM EDT Indication: Knee pain after gardening Comparison: None available. Findings: There is no evidence of fracture. Mild medial and patellofemoral compartment degenerative change. Patellar and quadriceps tendon enthesopathy. No significant joint effusion. Prepatellar soft tissue edema.     Impression: Prepatellar soft tissue edema. No evidence of fracture or significant joint effusion. Electronically Signed: Dustin Salazar MD  4/28/2024 4:15 PM EDT  Workstation ID: [id number]      Ordered the above noted radiological studies.  Independently interpreted by me.  My findings will be discussed in the medical decision section below.     PROGRESS, DATA ANALYSIS, CONSULTS AND MEDICAL DECISION MAKING    Please note that  this section constitutes my independent interpretation of clinical data including lab results, radiology, EKG's.  This constitutes my independent professional opinion regarding differential diagnosis and management of this patient.  It may include any factors such as history from outside sources, review of external records, social determinants of health, management of medications, response to those treatments, and discussions with other providers.    ED Course as of 04/28/24 1743   Sun Apr 28, 2024   1559 BP: 147/65 [RC]   1559 Temp: 98.6 °F (37 °C) [RC]   1559 Heart Rate: 78 [RC]   1559 Resp: 20 [RC]   1559 SpO2: 98 %  RA [RC]   1615 I viewed the patient's left knee x-ray and see no acute fracture or bony process. [RC]   1622 IMPRESSION:  Impression:  Prepatellar soft tissue edema. No evidence of fracture or significant joint effusion.        Electronically Signed: Dustin Salazar MD    4/28/2024 4:15 PM EDT    Workstation ID: [id number]   [RC]   1631 Clinical picture most consistent with a early infectious bursitis and the patient was working outdoors prior to the onset of symptoms and the symptoms gradually developed roughly 24 hours later.  Patient is a type II diabetic not on insulin.  Will go ahead and order a CBC and CMP, p.o. Keflex, and place a call to the orthopedist. [RC]   1658 WBC: 7.63 [RC]   1658 RBC: 4.21 [RC]   1658 Hemoglobin(!): 12.9 [RC]   1658 Hematocrit: 40.1 [RC]   1658 Platelets: 189 [RC]   1708 Glucose: 75 [RC]   1708 BUN(!): 30 [RC]   1708 Creatinine(!): 1.48  Baseline for patient [RC]   1708 Sodium(!): 135 [RC]   1708 WBC: 7.63 [RC]   1708 RBC: 4.21 [RC]   1708 Hemoglobin(!): 12.9 [RC]   1708 Hematocrit: 40.1 [RC]   1708 Platelets: 189 [RC]   1737 Discussed patient's case with Dr. Bryant Wilhelm.  He recommends IV Kefzol here in the ED.  Home on Keflex.  He will see the patient in office in the next 48 hours.  Patient knows to return with worsening symptoms or should he have any further concerns.  [RC]      ED Course User Index  [RC] Wallace Fitzgerald III, PA     Orders placed during this visit:  Orders Placed This Encounter   Procedures    XR Knee 3 View Left    Comprehensive Metabolic Panel    CBC Auto Differential    Ortho (on-call MD unless specified)    CBC & Differential    ED Acknowledgement Form Needed;            Medical Decision Making  Amount and/or Complexity of Data Reviewed  Radiology: ordered.      Quadricep strain, partial quadriceps tear, quadriceps tendinitis, DJD, bursitis, avulsion injury.  Will obtain plain films of the left knee to further evaluate.      DIAGNOSIS  Final diagnoses:   Prepatellar bursitis of left knee   Cellulitis of left lower extremity          Medication List        New Prescriptions      cephalexin 500 MG capsule  Commonly known as: KEFLEX  Take 1 capsule by mouth 3 (Three) Times a Day.     doxycycline 100 MG capsule  Commonly known as: MONODOX  Take 1 capsule by mouth 2 (Two) Times a Day.               Where to Get Your Medications        These medications were sent to [id number] - [place], [place] - [address] - [phone]  - [phone] FX  [address] [place] [address]      Phone: [phone]   cephalexin 500 MG capsule  doxycycline 100 MG capsule         FOLLOW-UP  Kenny Blue MD  [address] [address]  [phone]      For wound re-check call this number.  Dr. Wilhelm will actually be at his [place] burn office and they can give you instructions in the morning.        Latest Documented Vital Signs:  As of 17:43 EDT  BP- 147/65 HR- 78 Temp- 98.6 °F (37 °C) (Oral) O2 sat- 98%    Appropriate PPE utilized throughout this patient encounter to include mask, if indicated, per current protocol. Hand hygiene was performed before donning PPE and after removal when leaving the room.    Please note that portions of this were completed with a voice recognition program.     Note Disclaimer: At [facility],  we believe that sharing information builds trust and better relationships. You are receiving this note because you are receiving care at [facility] or recently visited. It is possible you will see health information before a provider has talked with you about it. This kind of information can be easy to misunderstand. To help you fully understand what it means for your health, we urge you to discuss this note with your provider.

## 2024-04-28 NOTE — DISCHARGE INSTRUCTIONS
Take the prescribed Keflex and doxycycline.  Tylenol as needed for pain.  Warm compresses 3 times a day.  Wear the Ace wrap as needed for compression and discomfort.  Call number above to follow-up with Dr. Wilhelm/orthopedist in the next few days.  Return to the ER with worsening symptoms, fever, spreading of the redness, or should there be any further concerns.

## 2024-07-13 ENCOUNTER — APPOINTMENT (OUTPATIENT)
Dept: GENERAL RADIOLOGY | Facility: HOSPITAL | Age: 84
End: 2024-07-13
Payer: MEDICARE

## 2024-07-13 ENCOUNTER — HOSPITAL ENCOUNTER (OUTPATIENT)
Facility: HOSPITAL | Age: 84
Discharge: HOME | End: 2024-07-13
Attending: EMERGENCY MEDICINE | Admitting: EMERGENCY MEDICINE
Payer: MEDICARE

## 2024-07-13 VITALS
BODY MASS INDEX: 30.15 KG/M2 | SYSTOLIC BLOOD PRESSURE: 154 MMHG | RESPIRATION RATE: 18 BRPM | HEART RATE: 63 BPM | OXYGEN SATURATION: 95 % | TEMPERATURE: 98 F | WEIGHT: 242.51 LBS | DIASTOLIC BLOOD PRESSURE: 74 MMHG | HEIGHT: 75 IN

## 2024-07-13 DIAGNOSIS — M25.561 MEDIAL KNEE PAIN, RIGHT: Primary | ICD-10-CM

## 2024-07-13 PROCEDURE — G0463 HOSPITAL OUTPT CLINIC VISIT: HCPCS | Performed by: EMERGENCY MEDICINE

## 2024-07-13 PROCEDURE — 99213 OFFICE O/P EST LOW 20 MIN: CPT | Performed by: EMERGENCY MEDICINE

## 2024-07-13 PROCEDURE — 73562 X-RAY EXAM OF KNEE 3: CPT

## 2024-07-13 NOTE — DISCHARGE INSTRUCTIONS
Ice the knee 4-5 times a day for 10 to 50 minutes at a time.  Follow-up with your surgeon as discussed.

## 2024-07-13 NOTE — FSED PROVIDER NOTE
Subjective   History of Present Illness  Pleasant 83-year-old male complaining of right knee pain.  He says that he does a lot of gardening and his 8 acre garden.  Also has had some right hip pain over the last month thinks he might be walking a little bit different.  The pain is located in the anteromedial portion of the knee.  It is not significantly painful but it is hurting more and more.  No swelling no dysfunction  Review of Systems   Musculoskeletal:  Positive for arthralgias.       Past Medical History:   Diagnosis Date    Diabetes     Essential hypertension     Hyperlipidemia 03/05/2013    Obstructive sleep apnea syndrome 05/02/2022       No Known Allergies    Past Surgical History:   Procedure Laterality Date    CARDIAC CATHETERIZATION  12/08/2022    CARDIAC CATHETERIZATION Left 12/8/2022    Procedure: Left Heart Cath and coronary angiogram;  Surgeon: Haris Estrada MD;  Location: [facility] CATH INVASIVE LOCATION;  Service: Cardiovascular;  Laterality: Left;    CARDIAC CATHETERIZATION N/A 12/8/2022    Procedure: Percutaneous Coronary Intervention;  Surgeon: Haris Estrada MD;  Location: [facility] CATH INVASIVE LOCATION;  Service: Cardiovascular;  Laterality: N/A;    CORONARY STENT PLACEMENT  12/07/2022    Status post PCI of left circumflex, the culprit vessel with IVUS guidance    INTERVENTIONAL RADIOLOGY PROCEDURE N/A 12/8/2022    Procedure: Intravascular Ultrasound;  Surgeon: Haris Estrada MD;  Location: [facility] CATH INVASIVE LOCATION;  Service: Cardiovascular;  Laterality: N/A;       Family History   Family history unknown: Yes       Social History     Socioeconomic History    Marital status:    Tobacco Use    Smoking status: Never     Passive exposure: Never    Smokeless tobacco: Never   Vaping Use    Vaping status: Never Used   Substance and Sexual Activity    Alcohol use: Not Currently    Drug use: Never    Sexual activity: Defer           Objective   Physical Exam  EXTREMITIES: Mildly tender to  palpation anteromedial knee, no range of motion restriction, no ligamentous laxity on testing but does have some mild discomfort with valgus stress.  Procedures           ED Course  ED Course as of 07/13/24 1018   Sat Jul 13, 2024   1017 History and physical as above, I am independently interpreted the x-ray do not appreciate any acute fractures, he does have some age-appropriate degeneration.  Encouraged him to ice rest and he already has an appointment with his orthopedic surgeon regarding his hip. [RO]      ED Course User Index  [RO] Wilfredo John MD                                           Medical Decision Making  Problems Addressed:  Medial knee pain, right: self-limited or minor problem    Amount and/or Complexity of Data Reviewed  Radiology: ordered and independent interpretation performed.        Final diagnoses:   Medial knee pain, right       ED Disposition  ED Disposition       ED Disposition   Discharge    Condition   Good    Comment   --               Your orthopedic surgeon      As scheduled         Medication List      No changes were made to your prescriptions during this visit.

## 2024-08-04 NOTE — PROGRESS NOTES
Encounter Date:08/14/2024        Patient ID: Wilfredo Mcnamara is a 83 y.o. male.      Chief Complaint:      History of Present Illness  Wilfredo Mcnamara is a 83 y.o. male with hypertension, hyperlipidemia, diabetes with peripheral neuropathy who presented to the hospital with complaints of chest pain.  He was diagnosed with non-ST elevation MI with changes in his ECG and rising troponins..  He was taken to the Cath Lab urgently and was noted to have 99% thrombotic lesion in his mid left circumflex artery which was treated with placement of a drug-eluting stent on 12/8/2022.   3.5 x 23 mm Xience ruperto point stent was placed and was postdilated with 4.5 mm balloon.  He was started on dual antiplatelet therapy.  Of note there was 40 to 50% diffuse disease in the LAD and 70% lesion in the small PLV.  An echocardiogram showed preserved LV function, grade 1 diastolic dysfunction with mild dilatation of aortic root 4.3 cm.     He continues to work out at the [facility] at least once a week.  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  He is physically active denies any symptoms today.    Current cardiac medications include dual antiplatelet therapy, high intensity statin, Coreg, losartan    The following portions of the patient's history were reviewed and updated as appropriate: allergies, current medications, past family history, past medical history, past social history, past surgical history, and problem list.    Review of Systems   Constitutional: Negative for malaise/fatigue.   Cardiovascular:  Negative for chest pain, dyspnea on exertion, leg swelling and palpitations.   Respiratory:  Negative for cough and shortness of breath.    Gastrointestinal:  Negative for abdominal pain, nausea and vomiting.   Neurological:  Negative for dizziness, focal weakness, headaches, light-headedness and numbness.   All other systems reviewed and are negative.      Current Outpatient Medications:   •  ascorbic acid (VITAMIN C) 500 MG tablet, Take 1  tablet by mouth Daily., Disp: , Rfl:   •  aspirin 81 MG EC tablet, Take 1 tablet by mouth Daily., Disp: 30 tablet, Rfl: 0  •  atorvastatin (LIPITOR) 40 MG tablet, Take 1 tablet by mouth Every Night., Disp: 90 tablet, Rfl: 3  •  carvedilol (COREG) 6.25 MG tablet, Take 0.5 tablets by mouth 2 (Two) Times a Day. (Patient not taking: Reported on 12/8/2023), Disp: , Rfl:   •  cephalexin (KEFLEX) 500 MG capsule, Take 1 capsule by mouth 3 (Three) Times a Day., Disp: 30 capsule, Rfl: 0  •  CHOLECALCIFEROL PO, Take 2,000 Units by mouth Daily., Disp: , Rfl:   •  clopidogrel (PLAVIX) 75 MG tablet, Take 1 tablet by mouth Daily., Disp: 30 tablet, Rfl: 3  •  doxycycline (MONODOX) 100 MG capsule, Take 1 capsule by mouth 2 (Two) Times a Day., Disp: 20 capsule, Rfl: 0  •  FREESTYLE LITE test strip, 1 each by Other route Daily. use to test blood sugar once daily, Disp: , Rfl:   •  gabapentin (NEURONTIN) 300 MG capsule, Take 2 capsules by mouth 2 (Two) Times a Day., Disp: , Rfl:   •  glimepiride (AMARYL) 2 MG tablet, Take 1 tablet by mouth Daily., Disp: , Rfl:   •  losartan (COZAAR) 25 MG tablet, Take 1 tablet by mouth Daily., Disp: , Rfl:   •  metFORMIN (GLUCOPHAGE) 500 MG tablet, Take 2 tablets by mouth 2 (Two) Times a Day With Meals., Disp: , Rfl:   •  multivitamin with minerals tablet tablet, Take 1 tablet by mouth Daily., Disp: , Rfl:   •  nitroglycerin (NITROSTAT) 0.4 MG SL tablet, Place 1 tablet under the tongue Every 5 (Five) Minutes As Needed for Chest Pain (Only if SBP Greater Than 100). Take no more than 3 doses in 15 minutes., Disp: 30 tablet, Rfl: 12  •  vitamin B-12 (CYANOCOBALAMIN) 1000 MCG tablet, Take 2.5 tablets by mouth Daily., Disp: , Rfl:     Current outpatient and discharge medications have been reconciled for the patient.  Reviewed by: Haris Estrada MD       No Known Allergies    Family History   Family history unknown: Yes       Past Surgical History:   Procedure Laterality Date   • CARDIAC CATHETERIZATION   12/08/2022   • CARDIAC CATHETERIZATION Left 12/8/2022    Procedure: Left Heart Cath and coronary angiogram;  Surgeon: Haris Estrada MD;  Location: [facility] CATH INVASIVE LOCATION;  Service: Cardiovascular;  Laterality: Left;   • CARDIAC CATHETERIZATION N/A 12/8/2022    Procedure: Percutaneous Coronary Intervention;  Surgeon: Haris Estrada MD;  Location: [facility] CATH INVASIVE LOCATION;  Service: Cardiovascular;  Laterality: N/A;   • CORONARY STENT PLACEMENT  12/07/2022    Status post PCI of left circumflex, the culprit vessel with IVUS guidance   • INTERVENTIONAL RADIOLOGY PROCEDURE N/A 12/8/2022    Procedure: Intravascular Ultrasound;  Surgeon: Haris Estrada MD;  Location: [facility] CATH INVASIVE LOCATION;  Service: Cardiovascular;  Laterality: N/A;       Past Medical History:   Diagnosis Date   • Diabetes    • Essential hypertension    • Hyperlipidemia 03/05/2013   • Obstructive sleep apnea syndrome 05/02/2022       Family History   Family history unknown: Yes       Social History     Socioeconomic History   • Marital status:    Tobacco Use   • Smoking status: Never     Passive exposure: Never   • Smokeless tobacco: Never   Vaping Use   • Vaping status: Never Used   Substance and Sexual Activity   • Alcohol use: Not Currently   • Drug use: Never   • Sexual activity: Defer               Objective:       Physical Exam    There were no vitals taken for this visit.  The patient is alert, oriented and in no distress.    Vital signs as noted above.    Head and neck revealed no carotid bruits or jugular venous distension.  No thyromegaly or lymphadenopathy is present.    Lungs clear.  No wheezing.  Breath sounds are normal bilaterally.    Heart normal first and second heart sounds.  No murmur..  No pericardial rub is present.  No gallop is present.    Abdomen soft and nontender.  No organomegaly is present.    Extremities revealed good peripheral pulses without any pedal edema.    Skin warm and dry.    Musculoskeletal  system is grossly normal.    CNS grossly normal.          No diagnosis found.LAB RESULTS (LAST 7 DAYS)    CBC        BMP        CMP         BNP        TROPONIN        CoAg        Creatinine Clearance  CrCl cannot be calculated (Patient's most recent lab result is older than the maximum 30 days allowed.).    ABG        Radiology  No radiology results for the last day    EKG    ECG 12 Lead    Date/Time: 8/14/2024 10:08 AM  Performed by: Haris Estrada MD    Authorized by: Haris Estrada MD  Comparison: compared with previous ECG   Similar to previous ECG  Comments: ECG shows normal sinus rhythm.  Heart rate 76, WA interval 248,  and QTc 445 ms.      Stress test      Echocardiogram  Results for orders placed during the hospital encounter of 12/07/22    Adult Transthoracic Echo Complete W/ Cont if Necessary Per Protocol    Interpretation Summary  •  Left ventricular ejection fraction appears to be 51 - 55%.  •  Left ventricular diastolic function is consistent with (grade I) impaired relaxation.  •  Mild dilation of the aortic root is present.  4.3 cm  •  No significant valvular abnormalities      Cardiac catheterization  Results for orders placed during the hospital encounter of 12/07/22    Cardiac Catheterization/Vascular Study    Conclusion  OPERATORS:  1. Haris Estrada M.D., Attending Cardiologist      PROCEDURE PERFORMED.  Ultrasound guided vascular access  Left heart catheterization  Coronary Angiogram 25609  Percutaneous coronary intervention with drug-eluting stent placement to LAD 40347  Intravascular ultrasound LAD 66343  Moderate Sedation 50 minutes    INDICATIONS FOR PROCEDURE.  82-year-old man presented with acute non-ST elevation myocardial infarction.  After discussing the risk and benefit of cardiac catheterization he was brought into the Cath Lab for urgent procedure    PROCEDURE IN DETAIL.  Informed consent was obtained from the patient after explaining the risks, benefits, and alternative options  of the procedure. After obtaining informed consent, the patient was brought to the cath lab and was prepped in a sterile fashion. Lidocaine 1% was used for local anesthesia into the right radial access site. The right radial artery was accessed under direct ultrasound visualization  with an angiocath needle via modified Seldinger technique. A 6F slender sheath was inserted successfully. Afterwards, 5F JR4  was advanced over a wire into the ascending aorta and used to cross the AV and obtain LV pressures.  AV gradient obtained via pullback technique. JR4 and JL3.5 diagnostic catheters were used to engage the ostia of the RCA and LM respectively. Images of the right and left coronary systems were obtained.    HEMODYNAMICS.  LV: 132/10, 17 mmHg  AO: 133/74, 100 mmHg    No significant gradient across aortic valve during pullback of JR4 catheter.    MEDICATIONS.  1. Versed  2. Fentanyl  3. Verapamil  4. Nitro  5. Heparin    FINDINGS.    Coronary Angiogram.    1. Left main. Left main is a large-caliber vessel which gives rise to the Left Anterior Descending and the Left circumflex.  Left main has no significant disease    2. Left Anterior Descending Artery. LAD is a large vessel which gives rise to several septal perforators and several diagonal branches.  In the midsegment it bifurcates into a diagonal and dual LAD system.  This has diffuse disease 50% throughout    3. Left Circumflex. The LCx is a meidum caliber which gives rise to marginals.  There is a 99% hazy looking lesion in the mid left circumflex with MARK II flow.    4. Right Coronary Artery. The RCA is a dominant vessel which gives rise to several small caliber branches.  PLV has 70% lesion in a small vessel 2 mm.  Overall RCA has luminal irregularities    Percutaneous coronary intervention  10,000 units of heparin were administered and ACT of more than 250 was documented.  Left main ostium was engaged with 6 Icelandic XB 3.5 guide catheter.  0.014 run-through  wire was then advanced into the distal left circumflex artery.  Lesion in the mid circumflex was then predilated with 2.5 x 12 noncompliant balloon.  Over the run-through wire I then advanced intravascular ultrasound catheter and obtained measurements.  Distal left circumflex measured 4 mm in dimension, proximal left circumflex measured 4.5 mm in dimension.  IVUS also confirmed 80% thrombotic lesion in the mid left circumflex.  I then placed 3.5 x 23 mm Xience ruperto point stent at 14 ashley.  This was postdilated with 4.5 x 15 mm noncompliant balloon at 16 ashley.  Final angiography showed 0% stenosis and MARK-3 flow.  Final IVUS showed fully expanded stents with excellent apposition.    All the catheters were exchanged over a wire and subsequently removed. The patient tolerated the procedure well without any complications. The pictures were reviewed at the end of the procedure. TR band applied to right wrist for hemostasis and inflated with 15 cc of air. No complications were encountered.    IMPRESSIONS.  1.  One-vessel obstructive coronary artery disease  2.  Status post PCI of left circumflex, the culprit vessel with IVUS guidance    RECOMMENDATIONS.  1.  Start dual antiplatelet therapy  2. Exercise and lifestyle modifications recommended.  3.  Risk factors modification  4.  Cardiac rehab referral          Assessment and Plan       There are no diagnoses linked to this encounter.     1. NSTEMI (non-ST elevated myocardial infarction) (HCC) (Primary)  Status post PCI to left circumflex artery.  3.5 x 23 mm Xience ruperto point stent at 14 ashley.  This was postdilated with 4.5mm balloon.  Continue dual antiplatelet therapy, high intensity statin and beta-blocker  He completed cardiac rehab and has now resumed his workouts at [facility]  Echocardiogram shows EF of 51 to 55%, grade 1 diastolic dysfunction  Encouraged him to continue to stay active.  2. Mixed hyperlipidemia  Continue high intensity statin.  His LDL is 132, total  cholesterol 201, HDL 36, triglyceride 183.  Repeat lipid panel.   3. Essential hypertension  Continue losartan and Coreg  Blood pressure and heart rate are well-controlled.  4. Coronary artery disease involving native coronary artery of native heart with unstable angina pectoris (HCC)  Continue risk factors modification  5. Type 2 diabetes mellitus without complication, without long-term current use of insulin ([facility])  Currently on glimepiride.   His HbA1c 6.9  Continue losartan for hypertension  6. Obstructive sleep apnea syndrome  Using CPAP  7. Overweight (BMI 25.0-29.9)  Discussed continuing exercise, diet and weight loss  8. Aortic root aneurysm  Aortic root is 4.3 cm  No longer taking Coreg due to side effects.  Unsure what the side effects were  Blood pressure and heart rate are well-controlled  October 2023 CT showed Aneurysmal dilatation of ascending aorta measuring 4.6 cm at the sinus of Valsalva and 4.2 cm at the level of the main pulmonary artery.   Continue to focus on blood pressure and heart rate control.

## 2024-08-14 ENCOUNTER — OFFICE VISIT (OUTPATIENT)
Dept: CARDIOLOGY | Facility: CLINIC | Age: 84
End: 2024-08-14
Payer: MEDICARE

## 2024-08-14 VITALS
HEART RATE: 74 BPM | HEIGHT: 75 IN | SYSTOLIC BLOOD PRESSURE: 138 MMHG | OXYGEN SATURATION: 95 % | BODY MASS INDEX: 29.09 KG/M2 | DIASTOLIC BLOOD PRESSURE: 74 MMHG | WEIGHT: 234 LBS

## 2024-08-14 DIAGNOSIS — M16.12 PRIMARY OSTEOARTHRITIS OF LEFT HIP: ICD-10-CM

## 2024-08-14 DIAGNOSIS — Z01.812 PRE-OPERATIVE LABORATORY EXAMINATION: ICD-10-CM

## 2024-08-14 DIAGNOSIS — I21.4 NSTEMI (NON-ST ELEVATED MYOCARDIAL INFARCTION): Primary | ICD-10-CM

## 2024-08-14 DIAGNOSIS — I71.21 AORTIC ROOT ANEURYSM: ICD-10-CM

## 2024-08-14 DIAGNOSIS — I10 ESSENTIAL HYPERTENSION: ICD-10-CM

## 2024-08-14 DIAGNOSIS — E11.9 TYPE 2 DIABETES MELLITUS WITHOUT COMPLICATION, WITHOUT LONG-TERM CURRENT USE OF INSULIN: ICD-10-CM

## 2024-08-14 DIAGNOSIS — E78.2 MIXED HYPERLIPIDEMIA: ICD-10-CM

## 2024-08-14 DIAGNOSIS — G47.33 OBSTRUCTIVE SLEEP APNEA SYNDROME: ICD-10-CM

## 2024-08-14 DIAGNOSIS — I25.110 CORONARY ARTERY DISEASE INVOLVING NATIVE CORONARY ARTERY OF NATIVE HEART WITH UNSTABLE ANGINA PECTORIS: ICD-10-CM

## 2024-08-14 DIAGNOSIS — E66.3 OVERWEIGHT (BMI 25.0-29.9): ICD-10-CM

## 2024-09-09 DIAGNOSIS — I71.21 ANEURYSM OF ASCENDING AORTA WITHOUT RUPTURE: Primary | ICD-10-CM

## 2024-10-15 ENCOUNTER — HOSPITAL ENCOUNTER (OUTPATIENT)
Dept: CT IMAGING | Facility: HOSPITAL | Age: 84
Discharge: HOME | End: 2024-10-15
Payer: MEDICARE

## 2024-10-15 DIAGNOSIS — I71.21 ANEURYSM OF ASCENDING AORTA WITHOUT RUPTURE: ICD-10-CM

## 2024-10-15 PROCEDURE — 71250 CT THORAX DX C-: CPT

## 2024-10-21 ENCOUNTER — OFFICE VISIT (OUTPATIENT)
Dept: CARDIAC SURGERY | Facility: CLINIC | Age: 84
End: 2024-10-21
Payer: MEDICARE

## 2024-10-21 VITALS
BODY MASS INDEX: 29.47 KG/M2 | SYSTOLIC BLOOD PRESSURE: 145 MMHG | OXYGEN SATURATION: 96 % | RESPIRATION RATE: 20 BRPM | HEART RATE: 59 BPM | DIASTOLIC BLOOD PRESSURE: 77 MMHG | WEIGHT: 237 LBS | TEMPERATURE: 97.3 F | HEIGHT: 75 IN

## 2024-10-21 DIAGNOSIS — I71.21 ANEURYSM OF ASCENDING AORTA WITHOUT RUPTURE: ICD-10-CM

## 2024-10-21 DIAGNOSIS — I10 ESSENTIAL HYPERTENSION: Primary | ICD-10-CM

## 2024-10-21 PROCEDURE — 3077F SYST BP >= 140 MM HG: CPT

## 2024-10-21 PROCEDURE — 3078F DIAST BP <80 MM HG: CPT

## 2024-10-21 PROCEDURE — 99214 OFFICE O/P EST MOD 30 MIN: CPT

## 2024-10-21 NOTE — PROGRESS NOTES
10/21/2024      Subjective:      Nigel Tinoco MD    Chief Complaint: Follow-up ascending aortic aneurysm    History of Present Illness:       Dear Nigel Vega MD and Colleagues,    It was nice to see Wilfredo Mcnamara in consultation at your request. He is a 83 y.o. male with hyperlipidemia, diabetes, coronary disease status post PCI, hypertension, sleep apnea, who had a CT of the chest for follow-up. The CT of chest on 10/15/2024 was reviewed and the aortic root measures maximally at 4.6 cm.  He denies shortness of breath, chest/back pain, numbness/tingling/pain of extremities.       Social history:      Patient Active Problem List   Diagnosis    Polyosteoarthritis    Hyperlipidemia    Type 2 diabetes mellitus    NSTEMI (non-ST elevated myocardial infarction)    Obstructive sleep apnea syndrome    Essential hypertension    Overweight (BMI 25.0-29.9)    Coronary artery disease involving native coronary artery of native heart with unstable angina pectoris    Cellulitis, unspecified cellulitis site    Aneurysm of ascending aorta without rupture       Past Medical History:   Diagnosis Date    Diabetes     Essential hypertension     Hyperlipidemia 03/05/2013    Obstructive sleep apnea syndrome 05/02/2022       Past Surgical History:   Procedure Laterality Date    CARDIAC CATHETERIZATION  12/08/2022    CARDIAC CATHETERIZATION Left 12/8/2022    Procedure: Left Heart Cath and coronary angiogram;  Surgeon: Haris Estrada MD;  Location: [facility] CATH INVASIVE LOCATION;  Service: Cardiovascular;  Laterality: Left;    CARDIAC CATHETERIZATION N/A 12/8/2022    Procedure: Percutaneous Coronary Intervention;  Surgeon: Haris Estrada MD;  Location: [facility] CATH INVASIVE LOCATION;  Service: Cardiovascular;  Laterality: N/A;    CORONARY STENT PLACEMENT  12/07/2022    Status post PCI of left circumflex, the culprit vessel with IVUS guidance    INTERVENTIONAL RADIOLOGY PROCEDURE N/A 12/8/2022    Procedure: Intravascular  Ultrasound;  Surgeon: Haris Estrada MD;  Location: [facility] INVASIVE LOCATION;  Service: Cardiovascular;  Laterality: N/A;       No Known Allergies      Current Outpatient Medications:     ascorbic acid (VITAMIN C) 500 MG tablet, Take 1 tablet by mouth Daily., Disp: , Rfl:     aspirin 81 MG EC tablet, Take 1 tablet by mouth Daily., Disp: 30 tablet, Rfl: 0    atorvastatin (LIPITOR) 40 MG tablet, Take 1 tablet by mouth Every Night., Disp: 90 tablet, Rfl: 3    carvedilol (COREG) 6.25 MG tablet, Take 0.5 tablets by mouth 2 (Two) Times a Day., Disp: , Rfl:     CHOLECALCIFEROL PO, Take 2,000 Units by mouth Daily., Disp: , Rfl:     clopidogrel (PLAVIX) 75 MG tablet, Take 1 tablet by mouth Daily., Disp: 30 tablet, Rfl: 3    FREESTYLE LITE test strip, 1 each by Other route Daily. use to test blood sugar once daily, Disp: , Rfl:     gabapentin (NEURONTIN) 300 MG capsule, Take 2 capsules by mouth 2 (Two) Times a Day., Disp: , Rfl:     glimepiride (AMARYL) 2 MG tablet, Take 1 tablet by mouth Daily., Disp: , Rfl:     losartan (COZAAR) 25 MG tablet, Take 1 tablet by mouth Daily., Disp: , Rfl:     metFORMIN (GLUCOPHAGE) 500 MG tablet, Take 2 tablets by mouth 2 (Two) Times a Day With Meals., Disp: , Rfl:     multivitamin with minerals tablet tablet, Take 1 tablet by mouth Daily., Disp: , Rfl:     nitroglycerin (NITROSTAT) 0.4 MG SL tablet, Place 1 tablet under the tongue Every 5 (Five) Minutes As Needed for Chest Pain (Only if SBP Greater Than 100). Take no more than 3 doses in 15 minutes., Disp: 30 tablet, Rfl: 12    vitamin B-12 (CYANOCOBALAMIN) 1000 MCG tablet, Take 2.5 tablets by mouth Daily., Disp: , Rfl:     Social History     Socioeconomic History    Marital status:    Tobacco Use    Smoking status: Never     Passive exposure: Never    Smokeless tobacco: Never   Vaping Use    Vaping status: Never Used   Substance and Sexual Activity    Alcohol use: Not Currently    Drug use: Never    Sexual activity: Defer        Family History   Family history unknown: Yes       The following portions of the patient's history were reviewed and updated as appropriate: He  has a past medical history of Diabetes, Essential hypertension, Hyperlipidemia (03/05/2013), and Obstructive sleep apnea syndrome (05/02/2022).  He does not have any pertinent problems on file.  He  has a past surgical history that includes Cardiac catheterization (12/08/2022); Coronary stent placement (12/07/2022); Cardiac catheterization (Left, 12/8/2022); Cardiac catheterization (N/A, 12/8/2022); and Interventional radiology procedure (N/A, 12/8/2022).  His Family history is unknown by patient.  He  reports that he has never smoked. He has never been exposed to tobacco smoke. He has never used smokeless tobacco. He reports that he does not currently use alcohol. He reports that he does not use drugs.  Current Outpatient Medications   Medication Sig Dispense Refill    ascorbic acid (VITAMIN C) 500 MG tablet Take 1 tablet by mouth Daily.      aspirin 81 MG EC tablet Take 1 tablet by mouth Daily. 30 tablet 0    atorvastatin (LIPITOR) 40 MG tablet Take 1 tablet by mouth Every Night. 90 tablet 3    carvedilol (COREG) 6.25 MG tablet Take 0.5 tablets by mouth 2 (Two) Times a Day.      CHOLECALCIFEROL PO Take 2,000 Units by mouth Daily.      clopidogrel (PLAVIX) 75 MG tablet Take 1 tablet by mouth Daily. 30 tablet 3    FREESTYLE LITE test strip 1 each by Other route Daily. use to test blood sugar once daily      gabapentin (NEURONTIN) 300 MG capsule Take 2 capsules by mouth 2 (Two) Times a Day.      glimepiride (AMARYL) 2 MG tablet Take 1 tablet by mouth Daily.      losartan (COZAAR) 25 MG tablet Take 1 tablet by mouth Daily.      metFORMIN (GLUCOPHAGE) 500 MG tablet Take 2 tablets by mouth 2 (Two) Times a Day With Meals.      multivitamin with minerals tablet tablet Take 1 tablet by mouth Daily.      nitroglycerin (NITROSTAT) 0.4 MG SL tablet Place 1 tablet under the  tongue Every 5 (Five) Minutes As Needed for Chest Pain (Only if SBP Greater Than 100). Take no more than 3 doses in 15 minutes. 30 tablet 12    vitamin B-12 (CYANOCOBALAMIN) 1000 MCG tablet Take 2.5 tablets by mouth Daily.       No current facility-administered medications for this visit.     Current Outpatient Medications on File Prior to Visit   Medication Sig    ascorbic acid (VITAMIN C) 500 MG tablet Take 1 tablet by mouth Daily.    aspirin 81 MG EC tablet Take 1 tablet by mouth Daily.    atorvastatin (LIPITOR) 40 MG tablet Take 1 tablet by mouth Every Night.    carvedilol (COREG) 6.25 MG tablet Take 0.5 tablets by mouth 2 (Two) Times a Day.    CHOLECALCIFEROL PO Take 2,000 Units by mouth Daily.    clopidogrel (PLAVIX) 75 MG tablet Take 1 tablet by mouth Daily.    FREESTYLE LITE test strip 1 each by Other route Daily. use to test blood sugar once daily    gabapentin (NEURONTIN) 300 MG capsule Take 2 capsules by mouth 2 (Two) Times a Day.    glimepiride (AMARYL) 2 MG tablet Take 1 tablet by mouth Daily.    losartan (COZAAR) 25 MG tablet Take 1 tablet by mouth Daily.    metFORMIN (GLUCOPHAGE) 500 MG tablet Take 2 tablets by mouth 2 (Two) Times a Day With Meals.    multivitamin with minerals tablet tablet Take 1 tablet by mouth Daily.    nitroglycerin (NITROSTAT) 0.4 MG SL tablet Place 1 tablet under the tongue Every 5 (Five) Minutes As Needed for Chest Pain (Only if SBP Greater Than 100). Take no more than 3 doses in 15 minutes.    vitamin B-12 (CYANOCOBALAMIN) 1000 MCG tablet Take 2.5 tablets by mouth Daily.     No current facility-administered medications on file prior to visit.     He has No Known Allergies..    Review of Systems:  Review of Systems    Physical Exam:    Vital Signs:  Weight: 108 kg (237 lb)   Body mass index is 29.62 kg/m².  Temp: 97.3 °F (36.3 °C)   Heart Rate: 59   BP: 145/77     Physical Exam     Assessment:     -Ascending aortic aneurysm.  4.6 cm.  Stable since last year.  Follow-up in 1  year  -Hypertension.  On the upper side of normality.  Follow PCP recommendations      Recommendation/Plan:     We discussed the importance of avoidance of over the counter decongestants and stimulants, specifically pseudoephedrine, for hypertension and aneurysm management    Risk factor modification of hypertension with a goal blood pressure less than 130/80, hyperlipidemia optimization, and continued avoidance of tobacco/nicotine products.    Initiation of low aerobic exercise is indicated to help reduce body habitus, assist with blood pressure and cholesterol control.       Although risk of rupture is low, emergency department presentation is warranted for acute chest, back, or abdominal pain, syncope, or stroke like symptoms    Thank you for allowing us to participate in his care.    Regards,    Sidney Kelly MD    ** all problems new to this practitioner, extensive review of records prior and during patient interview, >30 minutes in face to face conversation regarding treatment plan, education, and answering any questions the patient may have had

## 2024-10-22 ENCOUNTER — TELEPHONE (OUTPATIENT)
Dept: CARDIAC SURGERY | Facility: CLINIC | Age: 84
End: 2024-10-22
Payer: MEDICARE

## 2024-10-22 DIAGNOSIS — I71.21 ANEURYSM OF ASCENDING AORTA WITHOUT RUPTURE: Primary | ICD-10-CM

## 2025-01-13 ENCOUNTER — HOSPITAL ENCOUNTER (OUTPATIENT)
Facility: HOSPITAL | Age: 85
Discharge: HOME | End: 2025-01-13
Attending: EMERGENCY MEDICINE | Admitting: EMERGENCY MEDICINE
Payer: MEDICARE

## 2025-01-13 ENCOUNTER — APPOINTMENT (OUTPATIENT)
Dept: GENERAL RADIOLOGY | Facility: HOSPITAL | Age: 85
End: 2025-01-13
Payer: MEDICARE

## 2025-01-13 VITALS
OXYGEN SATURATION: 94 % | RESPIRATION RATE: 18 BRPM | DIASTOLIC BLOOD PRESSURE: 65 MMHG | HEIGHT: 76 IN | WEIGHT: 237.8 LBS | HEART RATE: 91 BPM | SYSTOLIC BLOOD PRESSURE: 126 MMHG | BODY MASS INDEX: 28.96 KG/M2 | TEMPERATURE: 98.5 F

## 2025-01-13 DIAGNOSIS — B33.8 RSV INFECTION: ICD-10-CM

## 2025-01-13 DIAGNOSIS — J06.9 VIRAL URI WITH COUGH: Primary | ICD-10-CM

## 2025-01-13 LAB
FLUAV SUBTYP SPEC NAA+PROBE: NOT DETECTED
FLUBV RNA ISLT QL NAA+PROBE: NOT DETECTED
RSV RNA NPH QL NAA+NON-PROBE: DETECTED
SARS-COV-2 RNA RESP QL NAA+PROBE: NOT DETECTED

## 2025-01-13 PROCEDURE — 71046 X-RAY EXAM CHEST 2 VIEWS: CPT

## 2025-01-13 PROCEDURE — G0463 HOSPITAL OUTPT CLINIC VISIT: HCPCS | Performed by: EMERGENCY MEDICINE

## 2025-01-13 PROCEDURE — 87637 SARSCOV2&INF A&B&RSV AMP PRB: CPT | Performed by: EMERGENCY MEDICINE

## 2025-01-13 RX ORDER — BENZONATATE 200 MG/1
200 CAPSULE ORAL 3 TIMES DAILY PRN
Qty: 20 CAPSULE | Refills: 0 | Status: SHIPPED | OUTPATIENT
Start: 2025-01-13

## 2025-01-13 RX ORDER — DEXTROMETHORPHAN HYDROBROMIDE AND PROMETHAZINE HYDROCHLORIDE 15; 6.25 MG/5ML; MG/5ML
5 SYRUP ORAL NIGHTLY PRN
Qty: 118 ML | Refills: 0 | Status: SHIPPED | OUTPATIENT
Start: 2025-01-13

## 2025-01-13 NOTE — DISCHARGE INSTRUCTIONS
You have been diagnosed with RSV infection, a non-COVID-19 viral illness, supportive care recommended.  Over-the-counter medications for symptomatic relief may include: Mucinex, Sudafed, DayQuil/NyQuil, various nasal sprays.  Over-the-counter supplements including vitamin C, zinc and magnesium may also be helpful.  Rotate Tylenol and/or ibuprofen every 3-4 hours as needed for fever/pain.  Return to ER for any concerns.  Please follow-up with primary care physician in 3 to 4 days, if your symptoms continue, you may require additional treatment at that time.

## 2025-01-13 NOTE — FSED PROVIDER NOTE
Subjective   History of Present Illness    Review of Systems    Past Medical History:   Diagnosis Date   • Diabetes    • Essential hypertension    • Hyperlipidemia 03/05/2013   • Obstructive sleep apnea syndrome 05/02/2022       No Known Allergies    Past Surgical History:   Procedure Laterality Date   • CARDIAC CATHETERIZATION  12/08/2022   • CARDIAC CATHETERIZATION Left 12/8/2022    Procedure: Left Heart Cath and coronary angiogram;  Surgeon: Haris Estrada MD;  Location: [facility] CATH INVASIVE LOCATION;  Service: Cardiovascular;  Laterality: Left;   • CARDIAC CATHETERIZATION N/A 12/8/2022    Procedure: Percutaneous Coronary Intervention;  Surgeon: Haris Estrada MD;  Location: [facility] CATH INVASIVE LOCATION;  Service: Cardiovascular;  Laterality: N/A;   • CORONARY STENT PLACEMENT  12/07/2022    Status post PCI of left circumflex, the culprit vessel with IVUS guidance   • INTERVENTIONAL RADIOLOGY PROCEDURE N/A 12/8/2022    Procedure: Intravascular Ultrasound;  Surgeon: Haris Estrada MD;  Location: [facility] CATH INVASIVE LOCATION;  Service: Cardiovascular;  Laterality: N/A;       Family History   Family history unknown: Yes       Social History     Socioeconomic History   • Marital status:    Tobacco Use   • Smoking status: Never     Passive exposure: Never   • Smokeless tobacco: Never   Vaping Use   • Vaping status: Never Used   Substance and Sexual Activity   • Alcohol use: Not Currently   • Drug use: Never   • Sexual activity: Defer           Objective   Physical Exam    Procedures           ED Course                                           Medical Decision Making      Final diagnoses:   None       ED Disposition  ED Disposition       None            No follow-up provider specified.       Medication List      No changes were made to your prescriptions during this visit.

## 2025-01-13 NOTE — FSED PROVIDER NOTE
Subjective   History of Present Illness  Wilfredo Mcnamara is a 84 yr old male here today c/o cough, weakness, headache and body aches x 2 days. His wife had the same sx 1 week ago. Patient has tried otc decongestant with no improvement. Denies fever, body chills, nausea, vomiting, sob or chest pain.          Review of Systems   Constitutional:  Negative for chills and fever.   HENT:  Negative for congestion.    Respiratory:  Positive for cough. Negative for shortness of breath and wheezing.    Cardiovascular:  Negative for chest pain.       Past Medical History:   Diagnosis Date    Diabetes     Essential hypertension     Hyperlipidemia 03/05/2013    Obstructive sleep apnea syndrome 05/02/2022       No Known Allergies    Past Surgical History:   Procedure Laterality Date    CARDIAC CATHETERIZATION  12/08/2022    CARDIAC CATHETERIZATION Left 12/8/2022    Procedure: Left Heart Cath and coronary angiogram;  Surgeon: Haris Estrada MD;  Location: [facility] CATH INVASIVE LOCATION;  Service: Cardiovascular;  Laterality: Left;    CARDIAC CATHETERIZATION N/A 12/8/2022    Procedure: Percutaneous Coronary Intervention;  Surgeon: Haris Estrada MD;  Location: [facility] INVASIVE LOCATION;  Service: Cardiovascular;  Laterality: N/A;    CORONARY STENT PLACEMENT  12/07/2022    Status post PCI of left circumflex, the culprit vessel with IVUS guidance    INTERVENTIONAL RADIOLOGY PROCEDURE N/A 12/8/2022    Procedure: Intravascular Ultrasound;  Surgeon: Haris Estrada MD;  Location: [facility] CATH INVASIVE LOCATION;  Service: Cardiovascular;  Laterality: N/A;       Family History   Family history unknown: Yes       Social History     Socioeconomic History    Marital status:    Tobacco Use    Smoking status: Never     Passive exposure: Never    Smokeless tobacco: Never   Vaping Use    Vaping status: Never Used   Substance and Sexual Activity    Alcohol use: Not Currently    Drug use: Never    Sexual activity: Defer           Objective    Physical Exam  Vitals and nursing note reviewed.   Constitutional:       General: He is not in acute distress.     Appearance: He is normal weight. He is not ill-appearing.      Comments: The following exam was performed from a distance of 6 feet.  The patient had presumed or suspected upper respiratory infection that may be COVID-19.  The patient was in a negative pressure room if possible.  The provider as well as the patient and/or family members were asked to wear a mask when possible.   HENT:      Head: Normocephalic and atraumatic.      Right Ear: External ear normal.      Left Ear: External ear normal.      Nose: Nose normal.      Mouth/Throat:      Mouth: Mucous membranes are moist.      Pharynx: Oropharynx is clear.   Eyes:      Extraocular Movements: Extraocular movements intact.      Conjunctiva/sclera: Conjunctivae normal.      Pupils: Pupils are equal, round, and reactive to light.   Cardiovascular:      Rate and Rhythm: Normal rate and regular rhythm.      Comments: Per the bedside cardiac monitor  Pulmonary:      Effort: Pulmonary effort is normal. No respiratory distress.      Breath sounds: No wheezing or rales.      Comments: Patient's oxygen saturation was greater than 90% per the bedside pulse oximetry.  There were NO audible abnormal breath sounds present.  Abdominal:      General: Abdomen is flat. There is no distension.   Musculoskeletal:         General: No swelling, deformity or signs of injury. Normal range of motion.      Cervical back: Normal range of motion and neck supple.   Skin:     General: Skin is warm.      Capillary Refill: Capillary refill takes less than 2 seconds.      Findings: No erythema.   Neurological:      General: No focal deficit present.      Mental Status: He is alert and oriented to person, place, and time. Mental status is at baseline.   Psychiatric:         Mood and Affect: Mood normal.         Procedures           ED Course  ED Course as of 01/13/25 1844   Mon  Jan 13, 2025   1457 Patient is positive for RSV.  Chest x-ray is clear.  COVID and flu are negative.  We will discuss care instructions. [KZ]      ED Course User Index  [KZ] Srikanth Kong MD                                           Medical Decision Making  Wilfredo Mcnamara is a 84 yr old male here today c/o cough, weakness, headache and body aches x 2 days. Based on his physical exam I suspect viral infection, URI, pneumonia, less likely to be PE, pneumothorax.  Ordered viral panel, and chest xray to further assess.    Results:  Chest xray clear  Positive for RSV, COVID and influenza are negative     Treatment:  Advise patient to rest and increase fluid intake  F/u if sx worsen or don't improve     Problems Addressed:  RSV infection: complicated acute illness or injury  Viral URI with cough: complicated acute illness or injury    Amount and/or Complexity of Data Reviewed  Labs: ordered.  Radiology: ordered.    Risk  Prescription drug management.        Final diagnoses:   Viral URI with cough   RSV infection       ED Disposition  ED Disposition       ED Disposition   Discharge    Condition   Stable    Comment   --               Nigel Tinoco MD  [address] [place] [address] [place] [id number] [phone]    Schedule an appointment as soon as possible for a visit in 1 week  For repeat evaluation         Medication List        New Prescriptions      benzonatate 200 MG capsule  Commonly known as: TESSALON  Take 1 capsule by mouth 3 (Three) Times a Day As Needed for Cough.     promethazine-dextromethorphan 6.25-15 MG/5ML syrup  Commonly known as: PROMETHAZINE-DM  Take 5 mL by mouth At Night As Needed for Cough.               Where to Get Your Medications        These medications were sent to [id number] - [place], [place] - [address] - [phone]  - [phone] FX  [address] [place] [address]      Phone: [phone]   benzonatate 200 MG  capsule  promethazine-dextromethorphan 6.25-15 MG/5ML syrup       Patient seen and examined along with the physician assistant student.  Myself, the physician, is the one directly responsible for the medical decision making with this patient.  I have reviewed all of the documentation and am in agreement.  Any and all procedures were performed by a combination of the student and myself.

## 2025-02-07 ENCOUNTER — HOSPITAL ENCOUNTER (OUTPATIENT)
Facility: HOSPITAL | Age: 85
Discharge: HOME | End: 2025-02-07
Attending: EMERGENCY MEDICINE
Payer: MEDICARE

## 2025-02-07 ENCOUNTER — APPOINTMENT (OUTPATIENT)
Dept: GENERAL RADIOLOGY | Facility: HOSPITAL | Age: 85
End: 2025-02-07
Payer: MEDICARE

## 2025-02-07 VITALS
HEART RATE: 71 BPM | TEMPERATURE: 98.4 F | HEIGHT: 76 IN | DIASTOLIC BLOOD PRESSURE: 58 MMHG | SYSTOLIC BLOOD PRESSURE: 139 MMHG | RESPIRATION RATE: 18 BRPM | WEIGHT: 238.1 LBS | BODY MASS INDEX: 28.99 KG/M2 | OXYGEN SATURATION: 97 %

## 2025-02-07 DIAGNOSIS — M25.512 ARTHRALGIA OF LEFT SHOULDER REGION: Primary | ICD-10-CM

## 2025-02-07 PROCEDURE — G0463 HOSPITAL OUTPT CLINIC VISIT: HCPCS | Performed by: EMERGENCY MEDICINE

## 2025-02-07 PROCEDURE — 73030 X-RAY EXAM OF SHOULDER: CPT

## 2025-02-07 PROCEDURE — 25010000002 KETOROLAC TROMETHAMINE PER 15 MG: Performed by: EMERGENCY MEDICINE

## 2025-02-07 RX ORDER — KETOROLAC TROMETHAMINE 30 MG/ML
30 INJECTION, SOLUTION INTRAMUSCULAR; INTRAVENOUS ONCE
Status: COMPLETED | OUTPATIENT
Start: 2025-02-07 | End: 2025-02-07

## 2025-02-07 RX ADMIN — KETOROLAC TROMETHAMINE 30 MG: 30 INJECTION, SOLUTION INTRAMUSCULAR at 10:59

## 2025-02-07 NOTE — FSED PROVIDER NOTE
Subjective   History of Present Illness  Pt presents with L. Shoulder pain that worsens throughout the day, he describes a deep bony pain, no fall or trauma, [facility] [employer] but is very gentle on his shoulders, recently recovered from bad rsv infection 10 days ago, no cp/soa, no head or neck pain, alleviated by rest, exacerbated by rom    History provided by:  Patient   used: No        Review of Systems   Respiratory:  Negative for apnea.    Cardiovascular:  Negative for chest pain.   Musculoskeletal:  Positive for arthralgias.   All other systems reviewed and are negative.      Past Medical History:   Diagnosis Date    Diabetes     Essential hypertension     Hyperlipidemia 03/05/2013    Obstructive sleep apnea syndrome 05/02/2022       No Known Allergies    Past Surgical History:   Procedure Laterality Date    CARDIAC CATHETERIZATION  12/08/2022    CARDIAC CATHETERIZATION Left 12/8/2022    Procedure: Left Heart Cath and coronary angiogram;  Surgeon: aHris Estrada MD;  Location: [facility] CATH INVASIVE LOCATION;  Service: Cardiovascular;  Laterality: Left;    CARDIAC CATHETERIZATION N/A 12/8/2022    Procedure: Percutaneous Coronary Intervention;  Surgeon: Haris Estrada MD;  Location: [facility] INVASIVE LOCATION;  Service: Cardiovascular;  Laterality: N/A;    CORONARY STENT PLACEMENT  12/07/2022    Status post PCI of left circumflex, the culprit vessel with IVUS guidance    INTERVENTIONAL RADIOLOGY PROCEDURE N/A 12/8/2022    Procedure: Intravascular Ultrasound;  Surgeon: Haris Estrada MD;  Location: [facility] CATH INVASIVE LOCATION;  Service: Cardiovascular;  Laterality: N/A;       Family History   Family history unknown: Yes       Social History     Socioeconomic History    Marital status:    Tobacco Use    Smoking status: Never     Passive exposure: Never    Smokeless tobacco: Never   Vaping Use    Vaping status: Never Used   Substance and Sexual Activity    Alcohol use: Not  Currently    Drug use: Never    Sexual activity: Defer           Objective   Physical Exam  Vitals and nursing note reviewed.   HENT:      Head: Normocephalic.      Nose: Nose normal.   Eyes:      Conjunctiva/sclera: Conjunctivae normal.   Cardiovascular:      Rate and Rhythm: Normal rate.   Pulmonary:      Effort: Pulmonary effort is normal.   Musculoskeletal:      Cervical back: Normal range of motion.      Comments: Diffuse L. Prox shoulder pain, no scapular or clavicular pain, no elbow pain, distally intact   Skin:     General: Skin is warm.      Capillary Refill: Capillary refill takes less than 2 seconds.   Neurological:      General: No focal deficit present.      Mental Status: He is alert.   Psychiatric:         Mood and Affect: Mood normal.         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  No acute left shoulder findings.  Will need ortho f/u, will hold on steroids with DM hx, no s/s to suggest cardiac process    Amount and/or Complexity of Data Reviewed  Radiology: ordered. Decision-making details documented in ED Course.    Risk  Prescription drug management.        Final diagnoses:   Arthralgia of left shoulder region       ED Disposition  ED Disposition       ED Disposition   Discharge    Condition   Stable    Comment   --               Nigel Tinoco MD  [address] [facility]  [address]  [facility] [address]  [phone]    In 3 days  If symptoms worsen    Garth Henriquez MD  [address] [id number] [phone]               Medication List      No changes were made to your prescriptions during this visit.

## 2025-03-28 ENCOUNTER — HOSPITAL ENCOUNTER (OUTPATIENT)
Facility: HOSPITAL | Age: 85
Discharge: HOME | End: 2025-03-28
Attending: EMERGENCY MEDICINE
Payer: MEDICARE

## 2025-03-28 ENCOUNTER — APPOINTMENT (OUTPATIENT)
Dept: GENERAL RADIOLOGY | Facility: HOSPITAL | Age: 85
End: 2025-03-28
Payer: MEDICARE

## 2025-03-28 VITALS
HEART RATE: 91 BPM | HEIGHT: 75 IN | BODY MASS INDEX: 29.32 KG/M2 | WEIGHT: 235.8 LBS | DIASTOLIC BLOOD PRESSURE: 61 MMHG | OXYGEN SATURATION: 95 % | RESPIRATION RATE: 16 BRPM | SYSTOLIC BLOOD PRESSURE: 131 MMHG | TEMPERATURE: 98.4 F

## 2025-03-28 DIAGNOSIS — S51.012A SKIN TEAR OF LEFT ELBOW WITHOUT COMPLICATION, INITIAL ENCOUNTER: ICD-10-CM

## 2025-03-28 DIAGNOSIS — M25.512 ACUTE PAIN OF LEFT SHOULDER: Primary | ICD-10-CM

## 2025-03-28 PROCEDURE — 99213 OFFICE O/P EST LOW 20 MIN: CPT | Performed by: NURSE PRACTITIONER

## 2025-03-28 PROCEDURE — G0463 HOSPITAL OUTPT CLINIC VISIT: HCPCS | Performed by: NURSE PRACTITIONER

## 2025-03-28 PROCEDURE — 71101 X-RAY EXAM UNILAT RIBS/CHEST: CPT

## 2025-03-28 PROCEDURE — 73030 X-RAY EXAM OF SHOULDER: CPT

## 2025-03-28 RX ORDER — DIAPER,BRIEF,INFANT-TODD,DISP
1 EACH MISCELLANEOUS ONCE
Status: COMPLETED | OUTPATIENT
Start: 2025-03-28 | End: 2025-03-28

## 2025-03-28 RX ADMIN — BACITRACIN 0.9 G: 500 OINTMENT TOPICAL at 20:40

## 2025-03-29 NOTE — FSED PROVIDER NOTE
Subjective   History of Present Illness  84-year-old male presents with complaints of left shoulder and left-sided rib pain after fall today.  Patient reports he was working outside and missed a step and fell on his left side onto gravel.  Patient denies hitting his head or losing consciousness.    History provided by:  Patient      Review of Systems   Constitutional:  Negative for chills, fatigue and fever.   HENT:  Negative for ear pain, rhinorrhea, sinus pressure, sinus pain and sore throat.    Respiratory:  Negative for cough.    Cardiovascular:  Negative for chest pain.   Gastrointestinal:  Negative for diarrhea, nausea and vomiting.   Genitourinary:  Negative for dysuria.   Musculoskeletal:  Positive for arthralgias. Negative for back pain and joint swelling.   Skin:  Positive for wound.   Neurological:  Negative for dizziness.   Psychiatric/Behavioral:  Negative for confusion.        Past Medical History:   Diagnosis Date    Diabetes     Essential hypertension     Hyperlipidemia 03/05/2013    Obstructive sleep apnea syndrome 05/02/2022       No Known Allergies    Past Surgical History:   Procedure Laterality Date    CARDIAC CATHETERIZATION  12/08/2022    CARDIAC CATHETERIZATION Left 12/8/2022    Procedure: Left Heart Cath and coronary angiogram;  Surgeon: Haris Estrada MD;  Location: [facility] CATH INVASIVE LOCATION;  Service: Cardiovascular;  Laterality: Left;    CARDIAC CATHETERIZATION N/A 12/8/2022    Procedure: Percutaneous Coronary Intervention;  Surgeon: Haris Estrada MD;  Location: [facility] INVASIVE LOCATION;  Service: Cardiovascular;  Laterality: N/A;    CORONARY STENT PLACEMENT  12/07/2022    Status post PCI of left circumflex, the culprit vessel with IVUS guidance    INTERVENTIONAL RADIOLOGY PROCEDURE N/A 12/8/2022    Procedure: Intravascular Ultrasound;  Surgeon: Haris Estrada MD;  Location: [facility] CATH INVASIVE LOCATION;  Service: Cardiovascular;  Laterality: N/A;       Family History   Family  history unknown: Yes       Social History     Socioeconomic History    Marital status:    Tobacco Use    Smoking status: Never     Passive exposure: Never    Smokeless tobacco: Never   Vaping Use    Vaping status: Never Used   Substance and Sexual Activity    Alcohol use: Not Currently    Drug use: Never    Sexual activity: Defer           Objective   Physical Exam  Vitals and nursing note reviewed.   Constitutional:       Appearance: Normal appearance.   HENT:      Right Ear: Tympanic membrane normal.      Left Ear: Tympanic membrane normal.      Nose: Nose normal.      Mouth/Throat:      Mouth: Mucous membranes are moist.   Eyes:      Pupils: Pupils are equal, round, and reactive to light.   Cardiovascular:      Rate and Rhythm: Normal rate.      Pulses: Normal pulses.      Heart sounds: Normal heart sounds.   Pulmonary:      Effort: Pulmonary effort is normal.      Breath sounds: Normal breath sounds.   Abdominal:      General: Abdomen is flat.      Palpations: Abdomen is soft.   Musculoskeletal:         General: Tenderness and signs of injury present. No swelling or deformity. Normal range of motion.      Cervical back: Normal range of motion.   Skin:     General: Skin is warm and dry.      Comments: Skin tear noted to left elbow, edges approximate, not suture worthy, no bleeding noted.    Neurological:      Mental Status: He is alert.         Procedures           ED Course                                           Medical Decision Making  84-year-old male presents with complaints of left shoulder and left-sided rib pain after falling earlier today.  X-rays are negative for any acute rib fracture. X rays for the left shoulder are negative. Cleaned skin tear and dressed with bacitracin and wrapped with gauze. Encouraged patient to treat pain with tylenol and follow up as needed with his PCP.     Problems Addressed:  Acute pain of left shoulder: complicated acute illness or injury  Skin tear of left elbow  without complication, initial encounter: complicated acute illness or injury    Amount and/or Complexity of Data Reviewed  Radiology: ordered.    Risk  OTC drugs.        Final diagnoses:   Acute pain of left shoulder   Skin tear of left elbow without complication, initial encounter       ED Disposition  ED Disposition       ED Disposition   Discharge    Condition   Stable    Comment   --               Nigel Tinoco MD  [address] [place] [address] [phone]      As needed         Medication List      No changes were made to your prescriptions during this visit.

## 2025-03-31 ENCOUNTER — HOSPITAL ENCOUNTER (EMERGENCY)
Facility: HOSPITAL | Age: 85
Discharge: HOME | End: 2025-03-31
Attending: EMERGENCY MEDICINE | Admitting: EMERGENCY MEDICINE
Payer: MEDICARE

## 2025-03-31 ENCOUNTER — APPOINTMENT (OUTPATIENT)
Dept: CT IMAGING | Facility: HOSPITAL | Age: 85
End: 2025-03-31
Payer: MEDICARE

## 2025-03-31 VITALS
BODY MASS INDEX: 28.2 KG/M2 | HEIGHT: 76 IN | HEART RATE: 94 BPM | SYSTOLIC BLOOD PRESSURE: 166 MMHG | WEIGHT: 231.6 LBS | RESPIRATION RATE: 18 BRPM | DIASTOLIC BLOOD PRESSURE: 88 MMHG | TEMPERATURE: 97.7 F | OXYGEN SATURATION: 95 %

## 2025-03-31 DIAGNOSIS — S22.42XA MULTIPLE FRACTURES OF RIBS, LEFT SIDE, INITIAL ENCOUNTER FOR CLOSED FRACTURE: Primary | ICD-10-CM

## 2025-03-31 PROCEDURE — 73200 CT UPPER EXTREMITY W/O DYE: CPT

## 2025-03-31 PROCEDURE — 63710000001 ONDANSETRON ODT 4 MG TABLET DISPERSIBLE: Performed by: EMERGENCY MEDICINE

## 2025-03-31 PROCEDURE — 99284 EMERGENCY DEPT VISIT MOD MDM: CPT

## 2025-03-31 PROCEDURE — 99284 EMERGENCY DEPT VISIT MOD MDM: CPT | Performed by: EMERGENCY MEDICINE

## 2025-03-31 PROCEDURE — 71250 CT THORAX DX C-: CPT

## 2025-03-31 RX ORDER — ONDANSETRON 4 MG/1
4 TABLET, ORALLY DISINTEGRATING ORAL ONCE
Status: COMPLETED | OUTPATIENT
Start: 2025-03-31 | End: 2025-03-31

## 2025-03-31 RX ORDER — HYDROCODONE BITARTRATE AND ACETAMINOPHEN 5; 325 MG/1; MG/1
1 TABLET ORAL EVERY 4 HOURS PRN
Qty: 15 TABLET | Refills: 0 | Status: SHIPPED | OUTPATIENT
Start: 2025-03-31

## 2025-03-31 RX ORDER — HYDROCODONE BITARTRATE AND ACETAMINOPHEN 5; 325 MG/1; MG/1
1 TABLET ORAL ONCE
Refills: 0 | Status: COMPLETED | OUTPATIENT
Start: 2025-03-31 | End: 2025-03-31

## 2025-03-31 RX ADMIN — ONDANSETRON 4 MG: 4 TABLET, ORALLY DISINTEGRATING ORAL at 10:27

## 2025-03-31 RX ADMIN — HYDROCODONE BITARTRATE AND ACETAMINOPHEN 1 TABLET: 5; 325 TABLET ORAL at 10:27

## 2025-03-31 NOTE — DISCHARGE INSTRUCTIONS
You have been diagnosed with a rib contusion, use incentive spirometer to help with healing and prevent pneumothorax and/or pneumonia.  Return to the emergency room for any concerns.  Take over-the-counter Tylenol and or ibuprofen as needed for mild to moderate pain, prescribed occasion for more severe pain.  Ice to your chest wall may also be helpful.  Some patients find it numbing creams to also have some benefit.  Follow-up with your PCP in 1 week.

## 2025-03-31 NOTE — FSED PROVIDER NOTE
Subjective   History of Present Illness  Wilfredo presents today with Left shoulder pain. Patient stated he has left shoulder pain after a fall in the woods on 03/28/2025. He came to the ER after the fall where x-rays were taken and was negative for a fracture. He presents today due to worsening pain of the left shoulder and limited ROM within the left shoulder and left chest. Patient has been taking Tylenol which is not managing his pain. Pain is described as sharp and is rated a 8/10. Admits to left shoulder pain, left chest pain with deep breathing and limited ROM with left shoulder and bone-on-bone osteoarthritis, chest pain over left sided chest with deep inspiration., tenderness over left anterior ribs. Denies fever, numbness, tingling, swelling over joint, redness over joint, chest pain,  Maintains adequate p.o intake and urine and stool output. No other concerns today.         Review of Systems   Constitutional: Negative.  Negative for chills, fatigue and fever.   Eyes: Negative.    Respiratory:  Negative for cough, chest tightness and shortness of breath.    Cardiovascular:  Positive for chest pain. Negative for palpitations.   Gastrointestinal:  Negative for abdominal pain, diarrhea, nausea and vomiting.   Genitourinary: Negative.    Musculoskeletal:  Positive for arthralgias and joint swelling.   Skin: Negative.  Negative for rash.   Neurological: Negative.  Negative for syncope, weakness, numbness and headaches.   Psychiatric/Behavioral: Negative.     All other systems reviewed and are negative.      Past Medical History:   Diagnosis Date    Diabetes     Essential hypertension     Hyperlipidemia 03/05/2013    Obstructive sleep apnea syndrome 05/02/2022       No Known Allergies    Past Surgical History:   Procedure Laterality Date    CARDIAC CATHETERIZATION  12/08/2022    CARDIAC CATHETERIZATION Left 12/8/2022    Procedure: Left Heart Cath and coronary angiogram;  Surgeon: Haris Estrada MD;  Location:   [facility] CATH INVASIVE LOCATION;  Service: Cardiovascular;  Laterality: Left;    CARDIAC CATHETERIZATION N/A 12/8/2022    Procedure: Percutaneous Coronary Intervention;  Surgeon: Haris Estrada MD;  Location: [facility] CATH INVASIVE LOCATION;  Service: Cardiovascular;  Laterality: N/A;    CORONARY STENT PLACEMENT  12/07/2022    Status post PCI of left circumflex, the culprit vessel with IVUS guidance    INTERVENTIONAL RADIOLOGY PROCEDURE N/A 12/8/2022    Procedure: Intravascular Ultrasound;  Surgeon: Haris Estrada MD;  Location: [facility] CATH INVASIVE LOCATION;  Service: Cardiovascular;  Laterality: N/A;       Family History   Family history unknown: Yes       Social History     Socioeconomic History    Marital status:    Tobacco Use    Smoking status: Never     Passive exposure: Never    Smokeless tobacco: Never   Vaping Use    Vaping status: Never Used   Substance and Sexual Activity    Alcohol use: Not Currently    Drug use: Never    Sexual activity: Defer           Objective   Physical Exam  Vitals and nursing note reviewed.   Constitutional:       General: He is not in acute distress.     Appearance: He is not ill-appearing.   HENT:      Head: Normocephalic.      Right Ear: External ear normal.      Left Ear: External ear normal.      Nose: Nose normal.      Mouth/Throat:      Mouth: Mucous membranes are moist.   Eyes:      Extraocular Movements: Extraocular movements intact.   Cardiovascular:      Rate and Rhythm: Normal rate and regular rhythm.      Pulses: Normal pulses.   Pulmonary:      Effort: Pulmonary effort is normal. No respiratory distress.   Chest:      Chest wall: Tenderness present. No mass, lacerations, deformity or swelling.   Breasts:     Left: Tenderness present.   Abdominal:      General: Abdomen is flat.   Musculoskeletal:         General: No swelling, tenderness, deformity or signs of injury. Normal range of motion.      Left shoulder: Tenderness and bony tenderness present. No swelling.  Decreased range of motion.      Cervical back: No tenderness.   Skin:     General: Skin is warm.      Capillary Refill: Capillary refill takes less than 2 seconds.   Neurological:      General: No focal deficit present.      Mental Status: He is alert and oriented to person, place, and time. Mental status is at baseline.   Psychiatric:         Mood and Affect: Mood normal.         Procedures           ED Course  ED Course as of 03/31/25 1133   Mon Mar 31, 2025   1112 CT scans requested. [KZ]   1117 CT shoulder is negative for fracture.  He does have 2 rib fractures. [KZ]      ED Course User Index  [KZ] Srikanth Kong MD                                           Medical Decision Making  Patient presents today with left shoulder pain and left sided chest pain over ribs.  Patient previously visited the ER and had negative plain film radiographs.  Because of his continued pain, we will go ahead and get a CT scan to further evaluate for possible fracture.  Given negative previous radiographs, I suspect that the patient most likely has some sort of soft tissue injury which may include ligamentous, tendon, muscle, bone contusion, or other soft tissue injury.  Did also consider septic arthritis or other inflammatory arthritis is, but this is unlikely based on his clinical exam.  In regards to his continued left chest wall pain, we did also considered the possibility of pulmonary contusion and no pneumothorax.  Subtle rib fracture also considered which will be further evaluated by the CT scan ordered today.  Given the patient's cardiac history we did consider cardiac etiology, but this is unlikely based on the fact that the patient has a known injury and his pain is reproducible.     Patient does have 2 rib fractures on the left which would explain his symptoms.  Suspect soft tissue injury with the shoulder which does not have a fracture.  Patient is given incentive spirometer and prescribed pain medication to go home  with.    Problems Addressed:  Multiple fractures of ribs, left side, initial encounter for closed fracture: complicated acute illness or injury    Amount and/or Complexity of Data Reviewed  Radiology: ordered and independent interpretation performed. Decision-making details documented in ED Course.    Risk  Prescription drug management.        Final diagnoses:   Multiple fractures of ribs, left side, initial encounter for closed fracture       ED Disposition  ED Disposition       ED Disposition   Discharge    Condition   Stable    Comment   --               Orthopedic surgeon    Schedule an appointment as soon as possible for a visit   As needed    Nigel Tinoco MD  [address] [place] [address] [place] [address] [phone]    Schedule an appointment as soon as possible for a visit   Should you need more pain medication         Medication List        New Prescriptions      HYDROcodone-acetaminophen 5-325 MG per tablet  Commonly known as: NORCO  Take 1 tablet by mouth Every 4 (Four) Hours As Needed for Moderate Pain or Severe Pain.               Where to Get Your Medications        These medications were sent to [id number] - [place], [place] - [address] - [phone]  - [phone]   [address] [place] [address]      Phone: [phone]   HYDROcodone-acetaminophen 5-325 MG per tablet

## 2025-04-11 ENCOUNTER — APPOINTMENT (OUTPATIENT)
Dept: GENERAL RADIOLOGY | Facility: HOSPITAL | Age: 85
End: 2025-04-11
Payer: MEDICARE

## 2025-04-11 ENCOUNTER — HOSPITAL ENCOUNTER (OUTPATIENT)
Facility: HOSPITAL | Age: 85
Discharge: HOME | End: 2025-04-11
Attending: EMERGENCY MEDICINE
Payer: MEDICARE

## 2025-04-11 VITALS
RESPIRATION RATE: 18 BRPM | TEMPERATURE: 98.5 F | HEART RATE: 93 BPM | SYSTOLIC BLOOD PRESSURE: 146 MMHG | DIASTOLIC BLOOD PRESSURE: 89 MMHG | WEIGHT: 231.48 LBS | HEIGHT: 76 IN | BODY MASS INDEX: 28.19 KG/M2 | OXYGEN SATURATION: 97 %

## 2025-04-11 DIAGNOSIS — M54.50 ACUTE LEFT-SIDED LOW BACK PAIN WITHOUT SCIATICA: ICD-10-CM
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
## 2025-04-11 PROCEDURE — 99213 OFFICE O/P EST LOW 20 MIN: CPT

## 2025-04-11 PROCEDURE — G0463 HOSPITAL OUTPT CLINIC VISIT: HCPCS

## 2025-04-11 PROCEDURE — 73502 X-RAY EXAM HIP UNI 2-3 VIEWS: CPT

## 2025-04-11 PROCEDURE — 72110 X-RAY EXAM L-2 SPINE 4/>VWS: CPT

## 2025-04-11 RX ORDER — LIDOCAINE 4 G/G
1 PATCH TOPICAL
Status: DISCONTINUED | OUTPATIENT
Start: 2025-04-11 | End: 2025-04-11 | Stop reason: HOSPADM

## 2025-04-11 RX ADMIN — LIDOCAINE 1 PATCH: 4 PATCH TOPICAL at 11:36

## 2025-04-11 NOTE — DISCHARGE INSTRUCTIONS
You can use Tylenol as needed for discomfort.  You can get over-the-counter lidocaine patches to apply to the area for 12 hours at a time.  Follow-up with your primary care provider and your orthopedic doctor.  Return to emergency room for any new or worsening symptoms.

## 2025-04-11 NOTE — FSED PROVIDER NOTE
[facility]    EMERGENCY [facility]NCOUNTER    Room Number:  04/04  Date seen:  4/11/2025  Time seen: 12:56 EDT  PCP: Nigel Tinoco MD  Historian: patient    HPI:  Chief complaint:fall  Context:Wilfredo Mcnamara is a 84 y.o. male who presents to the ED with c/o fall.  Patient reports that he was seen here after a fall couple weeks ago.  He reports first time they did x-rays which showed no acute findings.  He reports he was seen again to and diagnosed with 2 rib fractures on the left side.  He reports he fell 5 days after that and is having pain to the left lower back.  He reports he did not hit his head or lose consciousness at the time.  He reports that he is scheduled to have a shoulder replacement on the left side already.  Patient is nontoxic appearance    Timing: Constant  Duration: 3 weeks  Location: Left low back  Intensity/Severity: Moderate  Associated Symptoms: Back pain, fall    MEDICAL RECORD REVIEW  Hypertension, hyperlipidemia, sleep apnea, diabetes    ALLERGIES  Patient has no known allergies.    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    Hyperlipidemia 03/05/2013    Type 2 diabetes mellitus 02/14/2013    NSTEMI (non-ST elevated myocardial infarction) 12/07/2022    Obstructive sleep apnea syndrome 05/02/2022    Essential hypertension     Overweight (BMI 25.0-29.9) 12/09/2022    Coronary artery disease involving native coronary artery of native heart with unstable angina pectoris 12/07/2022    Cellulitis, unspecified cellulitis site 05/14/2023    Aneurysm of ascending aorta without rupture 10/26/2023     Resolved Ambulatory Problems     Diagnosis Date Noted    No Resolved Ambulatory Problems     Past Medical History:   Diagnosis Date    Diabetes        PAST SURGICAL HISTORY  Past Surgical History:   Procedure Laterality Date    CARDIAC CATHETERIZATION  12/08/2022    CARDIAC CATHETERIZATION  Left 12/8/2022    Procedure: Left Heart Cath and coronary angiogram;  Surgeon: Haris Estrada MD;  Location: [facility] CATH INVASIVE LOCATION;  Service: Cardiovascular;  Laterality: Left;    CARDIAC CATHETERIZATION N/A 12/8/2022    Procedure: Percutaneous Coronary Intervention;  Surgeon: Haris Estrada MD;  Location: [facility] CATH INVASIVE LOCATION;  Service: Cardiovascular;  Laterality: N/A;    CORONARY STENT PLACEMENT  12/07/2022    Status post PCI of left circumflex, the culprit vessel with IVUS guidance    INTERVENTIONAL RADIOLOGY PROCEDURE N/A 12/8/2022    Procedure: Intravascular Ultrasound;  Surgeon: Haris Estrada MD;  Location: [facility] CATH INVASIVE LOCATION;  Service: Cardiovascular;  Laterality: N/A;       FAMILY HISTORY  Family History   Family history unknown: Yes       SOCIAL HISTORY  Social History     Socioeconomic History    Marital status:    Tobacco Use    Smoking status: Never     Passive exposure: Never    Smokeless tobacco: Never   Vaping Use    Vaping status: Never Used   Substance and Sexual Activity    Alcohol use: Not Currently    Drug use: Never    Sexual activity: Defer       REVIEW OF SYSTEMS  Review of Systems    All systems reviewed and negative except for those discussed in HPI.     PHYSICAL EXAM    I have reviewed the triage vital signs and nursing notes.    ED Triage Vitals [04/11/25 1101]   Temp Heart Rate Resp BP SpO2   98.5 °F (36.9 °C) 93 18 146/89 97 %      Temp src Heart Rate Source Patient Position BP Location FiO2 (%)   Oral Monitor Sitting Right arm --       Physical Exam  Constitutional:       Appearance: Normal appearance. He is not toxic-appearing.   HENT:      Nose: Nose normal.      Mouth/Throat:      Mouth: Mucous membranes are moist.   Eyes:      Pupils: Pupils are equal, round, and reactive to light.   Cardiovascular:      Rate and Rhythm: Normal rate and regular rhythm.      Pulses: Normal pulses.   Pulmonary:      Effort: Pulmonary effort is normal.      Breath  sounds: Normal breath sounds.   Musculoskeletal:      Cervical back: Normal range of motion. No rigidity or tenderness.      Thoracic back: No tenderness or bony tenderness.      Lumbar back: Tenderness present. No bony tenderness. Normal range of motion. Negative right straight leg raise test and negative left straight leg raise test.        Back:    Skin:     General: Skin is warm.   Neurological:      General: No focal deficit present.      Mental Status: He is alert.   Psychiatric:         Mood and Affect: Mood normal.         Behavior: Behavior normal.         Vital signs and nursing notes reviewed.        LAB RESULTS  No results found for this or any previous visit (from the past 24 hours).    Ordered the above labs and independently reviewed the results.      RADIOLOGY RESULTS  XR Hip With or Without Pelvis 2 - 3 View Left  Result Date: 4/11/2025  XR HIP W OR WO PELVIS 2-3 VIEW LEFT Date of Exam: 4/11/2025 11:30 AM EDT Indication: fall, left low back/hip pain Comparison: None available. Findings: The patient had previous lower lumbar spine surgery. Pedicle screws bridged by rods are noted. There is evidence for laminectomies. There is a sclerotic area involving the right sacral area that could relate to bone island. The patient had left hip arthroplasty. The radiopaque components to the prosthetic devices seem to be in reasonable position. No definite acute osseous abnormality is seen involving the left hip. There is some narrowing of the right hip.     Impression: 1. Changes from left hip arthroplasty. 2. Postsurgical changes lumbar spine. Electronically Signed: Woodrow Lynne MD  4/11/2025 12:15 PM EDT  Workstation ID: [id number]    XR Spine Lumbar Complete 4+VW  Result Date: 4/11/2025  XR SPINE LUMBAR COMPLETE 4+VW Date of Exam: 4/11/2025 11:30 AM EDT Indication: low back pain after fall Comparison: None available. Findings/    Impression: Multiple views of the lumbar spine were obtained. No acute fracture or  subluxation is identified. Lumbar spondylosis is present. Posterior fusion hardware extends from L2-S1. Hardware appears intact. Multilevel laminectomy changes are present. Moderate lumbar spondylosis. Bones are demineralized. Sacroiliac joints are unremarkable. Vascular calcifications are seen. Partially visualized hip arthroplasty noted. Electronically Signed: Jose Benito MD  4/11/2025 11:57 AM EDT  Workstation ID: [id number]         I ordered the above noted radiological studies. Independently reviewed by me and discussed with radiologist.  See dictation above for official radiology interpretation.      Orders placed during this visit:  Orders Placed This Encounter   Procedures    XR Spine Lumbar Complete 4+VW    XR Hip With or Without Pelvis 2 - 3 View Left           PROCEDURES    Procedures        MEDICATIONS GIVEN IN ER    Medications   Lidocaine 4 % 1 patch (1 patch Transdermal Medication Applied 4/11/25 1136)         PROGRESS, DATA ANALYSIS, CONSULTS, AND MEDICAL DECISION MAKING    All labs and radiology studies have been independently reviewed by me.          AS OF 12:58 EDT VITALS:    BP - 146/89  HR - 93  TEMP - 98.5 °F (36.9 °C) (Oral)  02 SATS - 97%    Medical Decision Making  Is a 84-year-old male who presents today after fall 3 weeks ago.  Patient reports that he has been seen here twice since falling.  He reports he has had another fall after being seen here the second time.  Patient is able to ambulate without difficulty.  X-rays were obtained to evaluate for acute fracture or osseous abnormality.  I do think this likely musculoskeletal in nature.  Patient was given a lidocaine patch.  Recommend lidocaine patches over-the-counter, Tylenol.  Also recommended that he follow-up with his orthopedic doctor.  We discussed discharge instructions.  He was given return precautions with understanding.    Problems Addressed:  Acute left-sided low back pain without sciatica: complicated acute illness or  injury  Fall, initial encounter: complicated acute illness or injury    Amount and/or Complexity of Data Reviewed  Radiology: ordered.    Risk  OTC drugs.          DIAGNOSIS  Final diagnoses:   Fall, initial encounter   Acute left-sided low back pain without sciatica       New Medications Ordered This Visit   Medications    Lidocaine 4 % 1 patch           I performed hand hygiene on entry into the pt room and upon exit.      Part of this note may be an electronic transcription/translation of spoken language to printed text using the Dragon Dictation System.     Appropriate PPE worn during exam.    Dictated utilizing Dragon dictation     Note Disclaimer: At [facility], we believe that sharing information builds trust and better relationships. You are receiving this note because you recently visited [facility]. It is possible you will see health information before a provider has talked with you about it. This kind of information can be easy to misunderstand. To help you fully understand what it means for your health, we urge you to discuss this note with your provider.

## 2025-06-26 ENCOUNTER — HOSPITAL ENCOUNTER (INPATIENT)
Facility: HOSPITAL | Age: 85
LOS: 3 days | Discharge: HOME | End: 2025-06-29
Attending: EMERGENCY MEDICINE
Payer: MEDICARE

## 2025-06-26 ENCOUNTER — APPOINTMENT (OUTPATIENT)
Dept: GENERAL RADIOLOGY | Facility: HOSPITAL | Age: 85
End: 2025-06-26
Payer: MEDICARE

## 2025-06-26 DIAGNOSIS — L08.9 DIABETIC FOOT INFECTION: Primary | ICD-10-CM

## 2025-06-26 DIAGNOSIS — E11.628 DIABETIC FOOT INFECTION: Primary | ICD-10-CM

## 2025-06-26 PROBLEM — E11.621 DIABETIC FOOT ULCER ASSOCIATED WITH TYPE 2 DIABETES MELLITUS: Status: ACTIVE | Noted: 2025-06-26

## 2025-06-26 PROBLEM — L97.509 DIABETIC FOOT ULCER: Status: ACTIVE | Noted: 2025-06-26

## 2025-06-26 PROBLEM — E11.621 DIABETIC FOOT ULCER: Status: ACTIVE | Noted: 2025-06-26

## 2025-06-26 PROBLEM — L97.509 DIABETIC FOOT ULCER ASSOCIATED WITH TYPE 2 DIABETES MELLITUS: Status: ACTIVE | Noted: 2025-06-26

## 2025-06-26 LAB
ALBUMIN SERPL-MCNC: 4 G/DL (ref 3.5–5.2)
ALBUMIN/GLOB SERPL: 1.7 G/DL
ALP SERPL-CCNC: 101 U/L (ref 39–117)
ALT SERPL W P-5'-P-CCNC: 15 U/L (ref 1–41)
ANION GAP SERPL CALCULATED.3IONS-SCNC: 8.9 MMOL/L (ref 5–15)
AST SERPL-CCNC: 17 U/L (ref 1–40)
BASOPHILS # BLD AUTO: 0.03 10*3/MM3 (ref 0–0.2)
BASOPHILS NFR BLD AUTO: 0.5 % (ref 0–1.5)
BILIRUB SERPL-MCNC: 0.7 MG/DL (ref 0–1.2)
BUN SERPL-MCNC: 30.2 MG/DL (ref 8–23)
BUN/CREAT SERPL: 18.2 (ref 7–25)
CALCIUM SPEC-SCNC: 9.7 MG/DL (ref 8.6–10.5)
CHLORIDE SERPL-SCNC: 101 MMOL/L (ref 98–107)
CO2 SERPL-SCNC: 23.1 MMOL/L (ref 22–29)
CREAT SERPL-MCNC: 1.66 MG/DL (ref 0.76–1.27)
CRP SERPL-MCNC: 0.48 MG/DL (ref 0–0.5)
D-LACTATE SERPL-SCNC: 1.4 MMOL/L (ref 0.5–2)
D-LACTATE SERPL-SCNC: 2.1 MMOL/L (ref 0.5–2)
DEPRECATED RDW RBC AUTO: 47.3 FL (ref 37–54)
EGFRCR SERPLBLD CKD-EPI 2021: 40.9 ML/MIN/1.73
EOSINOPHIL # BLD AUTO: 0.09 10*3/MM3 (ref 0–0.4)
EOSINOPHIL NFR BLD AUTO: 1.6 % (ref 0.3–6.2)
ERYTHROCYTE [DISTWIDTH] IN BLOOD BY AUTOMATED COUNT: 13.4 % (ref 12.3–15.4)
ERYTHROCYTE [SEDIMENTATION RATE] IN BLOOD: 19 MM/HR (ref 0–20)
FLUAV SUBTYP SPEC NAA+PROBE: NOT DETECTED
FLUBV RNA ISLT QL NAA+PROBE: NOT DETECTED
GLOBULIN UR ELPH-MCNC: 2.3 GM/DL
GLUCOSE SERPL-MCNC: 265 MG/DL (ref 65–99)
HCT VFR BLD AUTO: 37.6 % (ref 37.5–51)
HGB BLD-MCNC: 12.3 G/DL (ref 13–17.7)
IMM GRANULOCYTES # BLD AUTO: 0.01 10*3/MM3 (ref 0–0.05)
IMM GRANULOCYTES NFR BLD AUTO: 0.2 % (ref 0–0.5)
LYMPHOCYTES # BLD AUTO: 1.12 10*3/MM3 (ref 0.7–3.1)
LYMPHOCYTES NFR BLD AUTO: 20.3 % (ref 19.6–45.3)
MCH RBC QN AUTO: 31.3 PG (ref 26.6–33)
MCHC RBC AUTO-ENTMCNC: 32.7 G/DL (ref 31.5–35.7)
MCV RBC AUTO: 95.7 FL (ref 79–97)
MONOCYTES # BLD AUTO: 0.74 10*3/MM3 (ref 0.1–0.9)
MONOCYTES NFR BLD AUTO: 13.4 % (ref 5–12)
NEUTROPHILS NFR BLD AUTO: 3.52 10*3/MM3 (ref 1.7–7)
NEUTROPHILS NFR BLD AUTO: 64 % (ref 42.7–76)
PLATELET # BLD AUTO: 167 10*3/MM3 (ref 140–450)
PMV BLD AUTO: 10.2 FL (ref 6–12)
POTASSIUM SERPL-SCNC: 4.3 MMOL/L (ref 3.5–5.2)
PROT SERPL-MCNC: 6.3 G/DL (ref 6–8.5)
RBC # BLD AUTO: 3.93 10*6/MM3 (ref 4.14–5.8)
RSV RNA NPH QL NAA+NON-PROBE: NOT DETECTED
SARS-COV-2 RNA RESP QL NAA+PROBE: NOT DETECTED
SODIUM SERPL-SCNC: 133 MMOL/L (ref 136–145)
WBC NRBC COR # BLD AUTO: 5.51 10*3/MM3 (ref 3.4–10.8)

## 2025-06-26 PROCEDURE — 99285 EMERGENCY DEPT VISIT HI MDM: CPT

## 2025-06-26 PROCEDURE — 99285 EMERGENCY DEPT VISIT HI MDM: CPT | Performed by: EMERGENCY MEDICINE

## 2025-06-26 PROCEDURE — 83605 ASSAY OF LACTIC ACID: CPT | Performed by: EMERGENCY MEDICINE

## 2025-06-26 PROCEDURE — 90471 IMMUNIZATION ADMIN: CPT | Performed by: EMERGENCY MEDICINE

## 2025-06-26 PROCEDURE — 87637 SARSCOV2&INF A&B&RSV AMP PRB: CPT | Performed by: EMERGENCY MEDICINE

## 2025-06-26 PROCEDURE — 25810000003 SODIUM CHLORIDE 0.9 % SOLUTION: Performed by: EMERGENCY MEDICINE

## 2025-06-26 PROCEDURE — 80053 COMPREHEN METABOLIC PANEL: CPT | Performed by: EMERGENCY MEDICINE

## 2025-06-26 PROCEDURE — 86140 C-REACTIVE PROTEIN: CPT | Performed by: EMERGENCY MEDICINE

## 2025-06-26 PROCEDURE — 25010000002 TETANUS-DIPHTH-ACELL PERTUSSIS 5-2.5-18.5 LF-MCG/0.5 SUSPENSION PREFILLED SYRINGE: Performed by: EMERGENCY MEDICINE

## 2025-06-26 PROCEDURE — 25010000002 CLINDAMYCIN PER 300 MG: Performed by: EMERGENCY MEDICINE

## 2025-06-26 PROCEDURE — 85652 RBC SED RATE AUTOMATED: CPT | Performed by: EMERGENCY MEDICINE

## 2025-06-26 PROCEDURE — 25810000003 SODIUM CHLORIDE 0.9 % SOLUTION

## 2025-06-26 PROCEDURE — 90715 TDAP VACCINE 7 YRS/> IM: CPT | Performed by: EMERGENCY MEDICINE

## 2025-06-26 PROCEDURE — 85025 COMPLETE CBC W/AUTO DIFF WBC: CPT | Performed by: EMERGENCY MEDICINE

## 2025-06-26 PROCEDURE — 73630 X-RAY EXAM OF FOOT: CPT

## 2025-06-26 PROCEDURE — 36415 COLL VENOUS BLD VENIPUNCTURE: CPT

## 2025-06-26 RX ORDER — POLYETHYLENE GLYCOL 3350 17 G/17G
17 POWDER, FOR SOLUTION ORAL DAILY PRN
Status: DISCONTINUED | OUTPATIENT
Start: 2025-06-26 | End: 2025-06-29 | Stop reason: HOSPADM

## 2025-06-26 RX ORDER — ACETAMINOPHEN 650 MG/1
650 SUPPOSITORY RECTAL EVERY 4 HOURS PRN
Status: DISCONTINUED | OUTPATIENT
Start: 2025-06-26 | End: 2025-06-29 | Stop reason: HOSPADM

## 2025-06-26 RX ORDER — SODIUM CHLORIDE 0.9 % (FLUSH) 0.9 %
10 SYRINGE (ML) INJECTION EVERY 12 HOURS SCHEDULED
Status: DISCONTINUED | OUTPATIENT
Start: 2025-06-27 | End: 2025-06-29 | Stop reason: HOSPADM

## 2025-06-26 RX ORDER — NALOXONE HCL 0.4 MG/ML
0.4 VIAL (ML) INJECTION
Status: DISCONTINUED | OUTPATIENT
Start: 2025-06-26 | End: 2025-06-29 | Stop reason: HOSPADM

## 2025-06-26 RX ORDER — MORPHINE SULFATE 2 MG/ML
1 INJECTION, SOLUTION INTRAMUSCULAR; INTRAVENOUS EVERY 4 HOURS PRN
Status: ACTIVE | OUTPATIENT
Start: 2025-06-26 | End: 2025-06-27

## 2025-06-26 RX ORDER — BISACODYL 5 MG/1
5 TABLET, DELAYED RELEASE ORAL DAILY PRN
Status: DISCONTINUED | OUTPATIENT
Start: 2025-06-26 | End: 2025-06-29 | Stop reason: HOSPADM

## 2025-06-26 RX ORDER — SODIUM CHLORIDE 9 MG/ML
40 INJECTION, SOLUTION INTRAVENOUS AS NEEDED
Status: DISCONTINUED | OUTPATIENT
Start: 2025-06-26 | End: 2025-06-29 | Stop reason: HOSPADM

## 2025-06-26 RX ORDER — AMOXICILLIN 250 MG
2 CAPSULE ORAL 2 TIMES DAILY PRN
Status: DISCONTINUED | OUTPATIENT
Start: 2025-06-26 | End: 2025-06-29 | Stop reason: HOSPADM

## 2025-06-26 RX ORDER — SODIUM CHLORIDE 9 MG/ML
100 INJECTION, SOLUTION INTRAVENOUS CONTINUOUS
Status: DISPENSED | OUTPATIENT
Start: 2025-06-27 | End: 2025-06-27

## 2025-06-26 RX ORDER — HYDROMORPHONE HYDROCHLORIDE 1 MG/ML
0.5 INJECTION, SOLUTION INTRAMUSCULAR; INTRAVENOUS; SUBCUTANEOUS
Refills: 0 | Status: DISCONTINUED | OUTPATIENT
Start: 2025-06-26 | End: 2025-06-29 | Stop reason: HOSPADM

## 2025-06-26 RX ORDER — BISACODYL 10 MG
10 SUPPOSITORY, RECTAL RECTAL DAILY PRN
Status: DISCONTINUED | OUTPATIENT
Start: 2025-06-26 | End: 2025-06-29 | Stop reason: HOSPADM

## 2025-06-26 RX ORDER — FEXOFENADINE HCL 180 MG/1
180 TABLET ORAL DAILY
COMMUNITY

## 2025-06-26 RX ORDER — ACETAMINOPHEN 160 MG/5ML
650 SOLUTION ORAL EVERY 4 HOURS PRN
Status: DISCONTINUED | OUTPATIENT
Start: 2025-06-26 | End: 2025-06-29 | Stop reason: HOSPADM

## 2025-06-26 RX ORDER — CLINDAMYCIN PHOSPHATE 900 MG/50ML
900 INJECTION, SOLUTION INTRAVENOUS ONCE
Status: COMPLETED | OUTPATIENT
Start: 2025-06-26 | End: 2025-06-26

## 2025-06-26 RX ORDER — HEPARIN SODIUM 5000 [USP'U]/ML
5000 INJECTION, SOLUTION INTRAVENOUS; SUBCUTANEOUS EVERY 8 HOURS SCHEDULED
Status: DISCONTINUED | OUTPATIENT
Start: 2025-06-27 | End: 2025-06-29 | Stop reason: HOSPADM

## 2025-06-26 RX ORDER — ACETAMINOPHEN 325 MG/1
650 TABLET ORAL EVERY 4 HOURS PRN
Status: DISCONTINUED | OUTPATIENT
Start: 2025-06-26 | End: 2025-06-29 | Stop reason: HOSPADM

## 2025-06-26 RX ORDER — NITROGLYCERIN 0.4 MG/1
0.4 TABLET SUBLINGUAL
Status: DISCONTINUED | OUTPATIENT
Start: 2025-06-26 | End: 2025-06-29 | Stop reason: HOSPADM

## 2025-06-26 RX ORDER — SODIUM CHLORIDE 0.9 % (FLUSH) 0.9 %
10 SYRINGE (ML) INJECTION AS NEEDED
Status: DISCONTINUED | OUTPATIENT
Start: 2025-06-26 | End: 2025-06-29 | Stop reason: HOSPADM

## 2025-06-26 RX ADMIN — SODIUM CHLORIDE 500 ML: 9 INJECTION, SOLUTION INTRAVENOUS at 19:23

## 2025-06-26 RX ADMIN — TETANUS TOXOID, REDUCED DIPHTHERIA TOXOID AND ACELLULAR PERTUSSIS VACCINE, ADSORBED 0.5 ML: 5; 2.5; 8; 8; 2.5 SUSPENSION INTRAMUSCULAR at 18:39

## 2025-06-26 RX ADMIN — SODIUM CHLORIDE 100 ML/HR: 9 INJECTION, SOLUTION INTRAVENOUS at 23:47

## 2025-06-26 RX ADMIN — Medication 10 ML: at 23:47

## 2025-06-26 RX ADMIN — CLINDAMYCIN PHOSPHATE 900 MG: 900 INJECTION, SOLUTION INTRAVENOUS at 18:39

## 2025-06-26 NOTE — FSED PROVIDER NOTE
Subjective   History of Present Illness  84-year-old male states he wore shoes that were too tight for him and did not notice ulcerations of his toes until 2 days ago.  Stated today they began weeping so he decided to come in.  States he sees a podiatrist who cuts his nails regularly.  Unable to feel pain secondary to severe neuropathy in his feet.  No fevers or chills.  No other acute somatic complaints at this time.    History provided by:  Patient      Review of Systems   All other systems reviewed and are negative.      Past Medical History:   Diagnosis Date    Diabetes     Essential hypertension     Hyperlipidemia 03/05/2013    Obstructive sleep apnea syndrome 05/02/2022       No Known Allergies    Past Surgical History:   Procedure Laterality Date    CARDIAC CATHETERIZATION  12/08/2022    CARDIAC CATHETERIZATION Left 12/8/2022    Procedure: Left Heart Cath and coronary angiogram;  Surgeon: Haris Estrada MD;  Location: [facility] CATH INVASIVE LOCATION;  Service: Cardiovascular;  Laterality: Left;    CARDIAC CATHETERIZATION N/A 12/8/2022    Procedure: Percutaneous Coronary Intervention;  Surgeon: Haris Estrada MD;  Location: [facility] CATH INVASIVE LOCATION;  Service: Cardiovascular;  Laterality: N/A;    CORONARY STENT PLACEMENT  12/07/2022    Status post PCI of left circumflex, the culprit vessel with IVUS guidance    INTERVENTIONAL RADIOLOGY PROCEDURE N/A 12/8/2022    Procedure: Intravascular Ultrasound;  Surgeon: Haris Estrada MD;  Location: [facility] CATH INVASIVE LOCATION;  Service: Cardiovascular;  Laterality: N/A;       Family History   Family history unknown: Yes       Social History     Socioeconomic History    Marital status:    Tobacco Use    Smoking status: Never     Passive exposure: Never    Smokeless tobacco: Never   Vaping Use    Vaping status: Never Used   Substance and Sexual Activity    Alcohol use: Not Currently    Drug use: Never    Sexual activity: Defer           Objective   Physical  Exam  Vitals and nursing note reviewed.   Constitutional:       Appearance: Normal appearance.   HENT:      Head: Normocephalic and atraumatic.      Nose: Nose normal.   Eyes:      Extraocular Movements: Extraocular movements intact.   Cardiovascular:      Rate and Rhythm: Normal rate and regular rhythm.   Pulmonary:      Effort: Pulmonary effort is normal. No respiratory distress.      Breath sounds: Normal breath sounds.   Abdominal:      General: Bowel sounds are normal.      Palpations: Abdomen is soft.      Tenderness: There is no abdominal tenderness.   Musculoskeletal:         General: Swelling present. No tenderness. Normal range of motion.      Cervical back: Normal range of motion and neck supple.   Skin:     General: Skin is warm.      Comments: Right 2nd and 3rd foot with ulcerations and erythema.  Second toe has circumferential erythema, third toe is isolated to anterior surface.   Neurological:      General: No focal deficit present.      Mental Status: He is alert and oriented to person, place, and time.   Psychiatric:         Mood and Affect: Mood normal.         Behavior: Behavior normal.         Procedures           ED Course  Results for orders placed or performed during the hospital encounter of 06/26/25   CBC Auto Differential    Collection Time: 06/26/25  6:27 PM    Specimen: Blood   Result Value Ref Range    WBC 5.51 3.40 - 10.80 10*3/mm3    RBC 3.93 (L) 4.14 - 5.80 10*6/mm3    Hemoglobin 12.3 (L) 13.0 - 17.7 g/dL    Hematocrit 37.6 37.5 - 51.0 %    MCV 95.7 79.0 - 97.0 fL    MCH 31.3 26.6 - 33.0 pg    MCHC 32.7 31.5 - 35.7 g/dL    RDW 13.4 12.3 - 15.4 %    RDW-SD 47.3 37.0 - 54.0 fl    MPV 10.2 6.0 - 12.0 fL    Platelets 167 140 - 450 10*3/mm3    Neutrophil % 64.0 42.7 - 76.0 %    Lymphocyte % 20.3 19.6 - 45.3 %    Monocyte % 13.4 (H) 5.0 - 12.0 %    Eosinophil % 1.6 0.3 - 6.2 %    Basophil % 0.5 0.0 - 1.5 %    Immature Grans % 0.2 0.0 - 0.5 %    Neutrophils, Absolute 3.52 1.70 - 7.00  10*3/mm3    Lymphocytes, Absolute 1.12 0.70 - 3.10 10*3/mm3    Monocytes, Absolute 0.74 0.10 - 0.90 10*3/mm3    Eosinophils, Absolute 0.09 0.00 - 0.40 10*3/mm3    Basophils, Absolute 0.03 0.00 - 0.20 10*3/mm3    Immature Grans, Absolute 0.01 0.00 - 0.05 10*3/mm3      XR Foot 3+ View Right    (Results Pending)      Medications   clindamycin (CLEOCIN) 900 mg in sodium chloride 0.9% 50 mL IVPB (premix) (900 mg Intravenous New Bag 6/26/25 1839)   Tetanus-Diphth-Acell Pertussis (BOOSTRIX) injection 0.5 mL (0.5 mL Intramuscular Given 6/26/25 1839)              Medical Decision Making  84-year-old male who presents with right 2nd and 3rd toe redness and drainage. Multiple differential diagnoses were considered including but not limited to diabetic foot infection, osteomyelitis, tinea corporis, contact dermatitis, less likely scabies, insect bites, viral exanthem, bacterial exanthem, I doubt TENS, erythema nodosum or drug eruption.         Problems Addressed:  Diabetic foot infection: complicated acute illness or injury    Amount and/or Complexity of Data Reviewed  Labs: ordered. Decision-making details documented in ED Course.  Radiology: ordered. Decision-making details documented in ED Course.    Risk  Prescription drug management.  Decision regarding hospitalization.        Final diagnoses:   Diabetic foot infection       ED Disposition  ED Disposition       ED Disposition   Decision to Admit    Condition   --    Comment   --               No follow-up provider specified.       Medication List      No changes were made to your prescriptions during this visit.

## 2025-06-27 ENCOUNTER — APPOINTMENT (OUTPATIENT)
Dept: MRI IMAGING | Facility: HOSPITAL | Age: 85
End: 2025-06-27
Payer: MEDICARE

## 2025-06-27 LAB
ALBUMIN SERPL-MCNC: 3.8 G/DL (ref 3.5–5.2)
ALBUMIN/GLOB SERPL: 1.5 G/DL
ALP SERPL-CCNC: 93 U/L (ref 39–117)
ALT SERPL W P-5'-P-CCNC: 19 U/L (ref 1–41)
ANION GAP SERPL CALCULATED.3IONS-SCNC: 12.6 MMOL/L (ref 5–15)
AST SERPL-CCNC: 24 U/L (ref 1–40)
BASOPHILS # BLD AUTO: 0.02 10*3/MM3 (ref 0–0.2)
BASOPHILS NFR BLD AUTO: 0.4 % (ref 0–1.5)
BILIRUB SERPL-MCNC: 0.6 MG/DL (ref 0–1.2)
BUN SERPL-MCNC: 29.9 MG/DL (ref 8–23)
BUN/CREAT SERPL: 17.8 (ref 7–25)
CALCIUM SPEC-SCNC: 8.9 MG/DL (ref 8.6–10.5)
CHLORIDE SERPL-SCNC: 103 MMOL/L (ref 98–107)
CO2 SERPL-SCNC: 22.4 MMOL/L (ref 22–29)
CREAT SERPL-MCNC: 1.68 MG/DL (ref 0.76–1.27)
DEPRECATED RDW RBC AUTO: 46.7 FL (ref 37–54)
EGFRCR SERPLBLD CKD-EPI 2021: 40.3 ML/MIN/1.73
EOSINOPHIL # BLD AUTO: 0.16 10*3/MM3 (ref 0–0.4)
EOSINOPHIL NFR BLD AUTO: 3 % (ref 0.3–6.2)
ERYTHROCYTE [DISTWIDTH] IN BLOOD BY AUTOMATED COUNT: 13.4 % (ref 12.3–15.4)
GLOBULIN UR ELPH-MCNC: 2.6 GM/DL
GLUCOSE BLDC GLUCOMTR-MCNC: 126 MG/DL (ref 70–105)
GLUCOSE BLDC GLUCOMTR-MCNC: 127 MG/DL (ref 70–105)
GLUCOSE BLDC GLUCOMTR-MCNC: 128 MG/DL (ref 70–105)
GLUCOSE BLDC GLUCOMTR-MCNC: 264 MG/DL (ref 70–105)
GLUCOSE SERPL-MCNC: 192 MG/DL (ref 65–99)
HCT VFR BLD AUTO: 37.9 % (ref 37.5–51)
HGB BLD-MCNC: 12.2 G/DL (ref 13–17.7)
IMM GRANULOCYTES # BLD AUTO: 0.02 10*3/MM3 (ref 0–0.05)
IMM GRANULOCYTES NFR BLD AUTO: 0.4 % (ref 0–0.5)
LYMPHOCYTES # BLD AUTO: 1.25 10*3/MM3 (ref 0.7–3.1)
LYMPHOCYTES NFR BLD AUTO: 23.5 % (ref 19.6–45.3)
MAGNESIUM SERPL-MCNC: 1.7 MG/DL (ref 1.6–2.4)
MCH RBC QN AUTO: 30.6 PG (ref 26.6–33)
MCHC RBC AUTO-ENTMCNC: 32.2 G/DL (ref 31.5–35.7)
MCV RBC AUTO: 95 FL (ref 79–97)
MONOCYTES # BLD AUTO: 0.83 10*3/MM3 (ref 0.1–0.9)
MONOCYTES NFR BLD AUTO: 15.6 % (ref 5–12)
NEUTROPHILS NFR BLD AUTO: 3.03 10*3/MM3 (ref 1.7–7)
NEUTROPHILS NFR BLD AUTO: 57.1 % (ref 42.7–76)
NRBC BLD AUTO-RTO: 0 /100 WBC (ref 0–0.2)
PHOSPHATE SERPL-MCNC: 3.2 MG/DL (ref 2.5–4.5)
PLATELET # BLD AUTO: 170 10*3/MM3 (ref 140–450)
PMV BLD AUTO: 10.8 FL (ref 6–12)
POTASSIUM SERPL-SCNC: 4.4 MMOL/L (ref 3.5–5.2)
PROT SERPL-MCNC: 6.4 G/DL (ref 6–8.5)
RBC # BLD AUTO: 3.99 10*6/MM3 (ref 4.14–5.8)
SODIUM SERPL-SCNC: 138 MMOL/L (ref 136–145)
WBC NRBC COR # BLD AUTO: 5.31 10*3/MM3 (ref 3.4–10.8)

## 2025-06-27 PROCEDURE — 63710000001 INSULIN GLARGINE PER 5 UNITS: Performed by: INTERNAL MEDICINE

## 2025-06-27 PROCEDURE — 25810000003 SODIUM CHLORIDE 0.9 % SOLUTION

## 2025-06-27 PROCEDURE — 87147 CULTURE TYPE IMMUNOLOGIC: CPT | Performed by: INTERNAL MEDICINE

## 2025-06-27 PROCEDURE — 80053 COMPREHEN METABOLIC PANEL: CPT

## 2025-06-27 PROCEDURE — 25810000003 SODIUM CHLORIDE 0.9 % SOLUTION 500 ML FLEX CONT: Performed by: INTERNAL MEDICINE

## 2025-06-27 PROCEDURE — 87040 BLOOD CULTURE FOR BACTERIA: CPT

## 2025-06-27 PROCEDURE — 87205 SMEAR GRAM STAIN: CPT | Performed by: INTERNAL MEDICINE

## 2025-06-27 PROCEDURE — 87070 CULTURE OTHR SPECIMN AEROBIC: CPT | Performed by: INTERNAL MEDICINE

## 2025-06-27 PROCEDURE — 83735 ASSAY OF MAGNESIUM: CPT

## 2025-06-27 PROCEDURE — 25010000002 VANCOMYCIN 1 G RECONSTITUTED SOLUTION 1 EACH VIAL: Performed by: INTERNAL MEDICINE

## 2025-06-27 PROCEDURE — 97165 OT EVAL LOW COMPLEX 30 MIN: CPT

## 2025-06-27 PROCEDURE — 84100 ASSAY OF PHOSPHORUS: CPT

## 2025-06-27 PROCEDURE — 63710000001 INSULIN LISPRO (HUMAN) PER 5 UNITS

## 2025-06-27 PROCEDURE — 25010000002 HEPARIN (PORCINE) PER 1000 UNITS

## 2025-06-27 PROCEDURE — 25010000002 CEFEPIME PER 500 MG: Performed by: INTERNAL MEDICINE

## 2025-06-27 PROCEDURE — 73718 MRI LOWER EXTREMITY W/O DYE: CPT

## 2025-06-27 PROCEDURE — 25010000002 VANCOMYCIN PER 500 MG: Performed by: INTERNAL MEDICINE

## 2025-06-27 PROCEDURE — 82948 REAGENT STRIP/BLOOD GLUCOSE: CPT

## 2025-06-27 PROCEDURE — 85025 COMPLETE CBC W/AUTO DIFF WBC: CPT

## 2025-06-27 PROCEDURE — 97162 PT EVAL MOD COMPLEX 30 MIN: CPT

## 2025-06-27 RX ORDER — ATORVASTATIN CALCIUM 40 MG/1
40 TABLET, FILM COATED ORAL NIGHTLY
Status: DISCONTINUED | OUTPATIENT
Start: 2025-06-27 | End: 2025-06-29 | Stop reason: HOSPADM

## 2025-06-27 RX ORDER — CLOPIDOGREL BISULFATE 75 MG/1
75 TABLET ORAL DAILY
Status: DISCONTINUED | OUTPATIENT
Start: 2025-06-27 | End: 2025-06-29 | Stop reason: HOSPADM

## 2025-06-27 RX ORDER — LOSARTAN POTASSIUM 25 MG/1
25 TABLET ORAL DAILY
Status: DISCONTINUED | OUTPATIENT
Start: 2025-06-27 | End: 2025-06-29 | Stop reason: HOSPADM

## 2025-06-27 RX ORDER — GABAPENTIN 300 MG/1
600 CAPSULE ORAL 2 TIMES DAILY
Status: DISCONTINUED | OUTPATIENT
Start: 2025-06-27 | End: 2025-06-29 | Stop reason: HOSPADM

## 2025-06-27 RX ORDER — INSULIN LISPRO 100 [IU]/ML
2-7 INJECTION, SOLUTION INTRAVENOUS; SUBCUTANEOUS
Status: DISCONTINUED | OUTPATIENT
Start: 2025-06-27 | End: 2025-06-29 | Stop reason: HOSPADM

## 2025-06-27 RX ORDER — NICOTINE POLACRILEX 4 MG
15 LOZENGE BUCCAL
Status: DISCONTINUED | OUTPATIENT
Start: 2025-06-27 | End: 2025-06-29 | Stop reason: HOSPADM

## 2025-06-27 RX ORDER — DOXYCYCLINE 100 MG/1
100 CAPSULE ORAL 2 TIMES DAILY
Qty: 20 CAPSULE | Refills: 0 | Status: SHIPPED | OUTPATIENT
Start: 2025-06-27 | End: 2025-07-07

## 2025-06-27 RX ORDER — DEXTROSE MONOHYDRATE 25 G/50ML
25 INJECTION, SOLUTION INTRAVENOUS
Status: DISCONTINUED | OUTPATIENT
Start: 2025-06-27 | End: 2025-06-29 | Stop reason: HOSPADM

## 2025-06-27 RX ORDER — MULTIPLE VITAMINS W/ MINERALS TAB 9MG-400MCG
1 TAB ORAL DAILY
Status: DISCONTINUED | OUTPATIENT
Start: 2025-06-27 | End: 2025-06-29 | Stop reason: HOSPADM

## 2025-06-27 RX ORDER — IBUPROFEN 600 MG/1
1 TABLET ORAL
Status: DISCONTINUED | OUTPATIENT
Start: 2025-06-27 | End: 2025-06-29 | Stop reason: HOSPADM

## 2025-06-27 RX ORDER — MULTIVITAMIN WITH IRON
2500 TABLET ORAL DAILY
Status: DISCONTINUED | OUTPATIENT
Start: 2025-06-27 | End: 2025-06-29 | Stop reason: HOSPADM

## 2025-06-27 RX ADMIN — Medication 2500 MCG: at 20:11

## 2025-06-27 RX ADMIN — GABAPENTIN 600 MG: 300 CAPSULE ORAL at 20:10

## 2025-06-27 RX ADMIN — HEPARIN SODIUM 5000 UNITS: 5000 INJECTION INTRAVENOUS; SUBCUTANEOUS at 14:59

## 2025-06-27 RX ADMIN — Medication 1 TABLET: at 20:11

## 2025-06-27 RX ADMIN — Medication 10 ML: at 20:11

## 2025-06-27 RX ADMIN — CEFEPIME 1000 MG: 1 INJECTION, POWDER, FOR SOLUTION INTRAMUSCULAR; INTRAVENOUS at 20:13

## 2025-06-27 RX ADMIN — HEPARIN SODIUM 5000 UNITS: 5000 INJECTION INTRAVENOUS; SUBCUTANEOUS at 22:25

## 2025-06-27 RX ADMIN — SODIUM CHLORIDE 100 ML/HR: 9 INJECTION, SOLUTION INTRAVENOUS at 14:59

## 2025-06-27 RX ADMIN — CLOPIDOGREL BISULFATE 75 MG: 75 TABLET, FILM COATED ORAL at 17:19

## 2025-06-27 RX ADMIN — HEPARIN SODIUM 5000 UNITS: 5000 INJECTION INTRAVENOUS; SUBCUTANEOUS at 05:07

## 2025-06-27 RX ADMIN — INSULIN LISPRO 4 UNITS: 100 INJECTION, SOLUTION INTRAVENOUS; SUBCUTANEOUS at 11:51

## 2025-06-27 RX ADMIN — ATORVASTATIN CALCIUM 40 MG: 40 TABLET, FILM COATED ORAL at 20:11

## 2025-06-27 RX ADMIN — Medication 10 ML: at 11:18

## 2025-06-27 RX ADMIN — LOSARTAN POTASSIUM 25 MG: 25 TABLET, FILM COATED ORAL at 20:11

## 2025-06-27 RX ADMIN — VANCOMYCIN HYDROCHLORIDE 2250 MG: 500 INJECTION, POWDER, LYOPHILIZED, FOR SOLUTION INTRAVENOUS at 11:51

## 2025-06-27 RX ADMIN — CEFEPIME 1000 MG: 1 INJECTION, POWDER, FOR SOLUTION INTRAMUSCULAR; INTRAVENOUS at 11:17

## 2025-06-27 RX ADMIN — INSULIN GLARGINE 15 UNITS: 100 INJECTION, SOLUTION SUBCUTANEOUS at 22:29

## 2025-06-27 NOTE — PROGRESS NOTES
[facility] MEDICINE SERVICE  DAILY PROGRESS NOTE    NAME: Wilfredo Mcnamara  : 1940  MRN: [id number]      LOS: 1 day     PROVIDER OF SERVICE: Manju Brown MD    Chief Complaint: Diabetic foot ulcer associated with type 2 diabetes mellitus    Subjective:     Interval History:  History taken from: patient    No new complaint        Review of Systems:   Review of Systems   All other systems reviewed and are negative.      Objective:     Vital Signs  Temp:  [97.3 °F (36.3 °C)-97.9 °F (36.6 °C)] 97.3 °F (36.3 °C)  Heart Rate:  [59-73] 68  Resp:  [15-17] 15  BP: (138-167)/(69-87) 161/87   Body mass index is 28.67 kg/m².    Physical Exam  Physical Exam  Constitutional:       Appearance: Normal appearance.   HENT:      Head: Normocephalic and atraumatic.      Nose: Nose normal.      Mouth/Throat:      Mouth: Mucous membranes are moist.   Eyes:      Extraocular Movements: Extraocular movements intact.      Conjunctiva/sclera: Conjunctivae normal.      Pupils: Pupils are equal, round, and reactive to light.   Cardiovascular:      Rate and Rhythm: Normal rate and regular rhythm.      Pulses: Normal pulses.      Heart sounds: Normal heart sounds.   Pulmonary:      Effort: Pulmonary effort is normal.      Breath sounds: Normal breath sounds.   Abdominal:      General: Abdomen is flat. Bowel sounds are normal.      Palpations: Abdomen is soft.   Musculoskeletal:         General: Normal range of motion.      Cervical back: Normal range of motion and neck supple.   Skin:     General: Skin is warm and dry.      Capillary Refill: Capillary refill takes less than 2 seconds.   Neurological:      General: No focal deficit present.      Mental Status: He is alert and oriented to person, place, and time.   Psychiatric:         Mood and Affect: Mood normal.         Behavior: Behavior normal.         Thought Content: Thought content normal.         Judgment: Judgment normal.         Current Medications:  Scheduled  Meds:atorvastatin, 40 mg, Oral, Nightly  cefepime, 1,000 mg, Intravenous, Q12H  clopidogrel, 75 mg, Oral, Daily  gabapentin, 600 mg, Oral, BID  heparin (porcine), 5,000 Units, Subcutaneous, Q8H  insulin lispro, 2-7 Units, Subcutaneous, 4x Daily AC & at Bedtime  losartan, 25 mg, Oral, Daily  multivitamin with minerals, 1 tablet, Oral, Daily  sodium chloride, 10 mL, Intravenous, Q12H  [START ON 6/28/2025] vancomycin, 1,250 mg, Intravenous, Once  vitamin B-12, 2,500 mcg, Oral, Daily      Continuous Infusions:Pharmacy to dose vancomycin,   Pharmacy To Dose:,   sodium chloride, 100 mL/hr, Last Rate: 100 mL/hr (06/27/25 7326)      PRN Meds:.  acetaminophen **OR** acetaminophen **OR** acetaminophen    senna-docusate sodium **AND** polyethylene glycol **AND** bisacodyl **AND** bisacodyl    Calcium Replacement - Follow Nurse / BPA Driven Protocol    dextrose    dextrose    glucagon (human recombinant)    HYDROmorphone **AND** naloxone    Magnesium Standard Dose Replacement - Follow Nurse / BPA Driven Protocol    Morphine **AND** naloxone    nitroglycerin    Pharmacy to dose vancomycin    Pharmacy To Dose:    Phosphorus Replacement - Follow Nurse / BPA Driven Protocol    Potassium Replacement - Follow Nurse / BPA Driven Protocol    sodium chloride    sodium chloride       Diagnostic Data    Results from last 7 days   Lab Units 06/27/25  0052   WBC 10*3/mm3 5.31   HEMOGLOBIN g/dL 12.2*   HEMATOCRIT % 37.9   PLATELETS 10*3/mm3 170   GLUCOSE mg/dL 192*   CREATININE mg/dL 1.68*   BUN mg/dL 29.9*   SODIUM mmol/L 138   POTASSIUM mmol/L 4.4   AST (SGOT) U/L 24   ALT (SGPT) U/L 19   ALK PHOS U/L 93   BILIRUBIN mg/dL 0.6   ANION GAP mmol/L 12.6       XR Foot 3+ View Right  Result Date: 6/26/2025  Impression: Prominent soft tissue edema centered at the fifth MTP joint, likely corresponding to reported infection. No radiographic evidence of acute osteomyelitis. If there is persistent clinical concern for osteomyelitis, MRI is more  sensitive. Electronically Signed: Frank Wood MD  6/26/2025 7:46 PM EDT  Workstation ID: [id number]        I reviewed the patient's new clinical results.    Assessment/Plan:     Active and Resolved Problems  Active Hospital Problems    Diagnosis  POA    **Diabetic foot ulcer associated with type 2 diabetes mellitus [E11.621, L97.509]  Yes    Diabetic foot ulcer [E11.621, L97.509]  Yes      Resolved Hospital Problems   No resolved problems to display.       Right diabetic foot ulcer  - Continue IV vancomycin and cefepime and follow-up on cultures.  Follow-up on MRI of the foot to rule out osteomyelitis.  Pending podiatry consult.    DM type II  - Blood close uncontrolled.  Start Lantus.  Continue lispro.  Continue home dose of Amaryl.  Continue to monitor blood glucose trend.  Check hemoglobin A1c level.    CKD stage III  - Stable creatinine.  Monitor.    Hypertension  - Resumed home antihypertensive medications.  Monitor the BP.    VTE Prophylaxis:  Pharmacologic VTE prophylaxis orders are present.       Disposition Planning:     Barriers to Discharge: Pending clinical improvement  Anticipated Date of Discharge: 7/2/2025  Place of Discharge: Home      Code Status and Medical Interventions: CPR (Attempt to Resuscitate); Full Support   Ordered at: 06/26/25 2336     Code Status (Patient has no pulse and is not breathing):    CPR (Attempt to Resuscitate)     Medical Interventions (Patient has pulse or is breathing):    Full Support       Signature: Electronically signed by Manju Brown MD, 06/27/25, 16:45 EDT.  [facility], [place], operated by [facility] Hospitalist Team

## 2025-06-27 NOTE — DISCHARGE PLACEMENT REQUEST
"Stephie Herrera (84 y.o. Male)       Date of Birth   1940    Social Security Number       Address   [place] [address]    Home Phone   [phone]    MRN   [id number]       Confucianism   Non-Voodoo    Marital Status                               Admission Date   6/26/2025    Admission Type   Urgent    Admitting Provider   Marco Antonio Todd MD    Attending Provider   Manju Brown MD    Department, Room/Bed   [facility] 2D, 273/B       Discharge Date       Discharge Disposition       Discharge Destination                                 Attending Provider: Manju Brown MD    Allergies: No Known Allergies    Isolation: None   Infection: MRSA No Isolation this Admit (05/15/23)   Code Status: CPR    Ht: 193 cm (75.98\")   Wt: 107 kg (235 lb 7.2 oz)    Admission Cmt: None   Principal Problem: Diabetic foot ulcer associated with type 2 diabetes mellitus [E11.621,L97.509]                   Active Insurance as of 6/26/2025       Primary Coverage       Payor Plan Insurance Group Employer/Plan Group    MEDICARE MEDICARE A & B        Payor Plan Address Payor Plan Phone Number Payor Plan Fax Number Effective Dates    [address] [phone]  11/1/2007 - None Entered    [facility] [id number]         Subscriber Name Subscriber Birth Date Member ID       STEPHIE HERRERA 1940 [id number]               Secondary Coverage       Payor Plan Insurance Group Employer/Plan Group    AARP MC SUP AARP HEALTH CARE OPTIONS        Payor Plan Address Payor Plan Phone Number Payor Plan Fax Number Effective Dates    [facility] [phone]  1/1/2021 - None Entered    [address]       [facility] [address]         Subscriber Name Subscriber Birth Date Member ID       STEPHIE HERRERA 1940 [id number]                     Emergency Contacts        (Rel.) Home Phone Work Phone Mobile Phone    TORITO HERRERA (Spouse) [phone] -- [phone] "

## 2025-06-27 NOTE — PLAN OF CARE
Goal Outcome Evaluation:  Plan of Care Reviewed With: patient        Progress: no change  Outcome Evaluation: Admitted with diabetic ulcers to toes. IV fluids infusing. No c/o discomfort.

## 2025-06-27 NOTE — PLAN OF CARE
Goal Outcome Evaluation:  Plan of Care Reviewed With: patient           Outcome Evaluation: Wilfredo Mcnamara is a 84 y.o. male with a CMH of Type 2 diabetes complicated with peripheral neuropathy, CAD/MI,hypertension, CKD stage III hyperlipidemia, who presented to [facility] from [facility] on 6/26/2025 with right foot ulcer.  Located in the dorsal part of his 2nd and 3rd toe.  No radiographic evidence of acute OM.  He is cleared for therapy by nursing and reports he has been getting up for toileting needs while IP.  In his normal state, he is very active and independent within the home and community-- working outdoors in garden with intermittent walking stick.  He resides in a [facility] with 2 WILFRID with his spouse; is an active  and without significant falls history.  He demonstrates LE sensory deficits and associated gait disturbance but is independent with functional mobility and reports being at baseline.  Reviewed appropriate foot wear and daily skin checks.  He voiced understanding.  Functionally, the patient is at baseline and does not require skilled PT while IP, recommend frequent mobilization with nursing and return home with family once medically appropriate.    Anticipated Discharge Disposition (PT): home

## 2025-06-27 NOTE — THERAPY EVALUATION
Patient Name: Wilfredo Mcnamara  : 1940    MRN: [id number]                              Today's Date: 2025       Admit Date: 2025    Visit Dx:     ICD-10-CM ICD-9-CM   1. Diabetic foot infection  E11.628 250.80    L08.9 686.9     Patient Active Problem List   Diagnosis    Polyosteoarthritis    Hyperlipidemia    Type 2 diabetes mellitus    NSTEMI (non-ST elevated myocardial infarction)    Obstructive sleep apnea syndrome    Essential hypertension    Overweight (BMI 25.0-29.9)    Coronary artery disease involving native coronary artery of native heart with unstable angina pectoris    Cellulitis, unspecified cellulitis site    Aneurysm of ascending aorta without rupture    Diabetic foot ulcer associated with type 2 diabetes mellitus    Diabetic foot ulcer     Past Medical History:   Diagnosis Date    Diabetes     Essential hypertension     Hyperlipidemia 2013    Obstructive sleep apnea syndrome 2022     Past Surgical History:   Procedure Laterality Date    CARDIAC CATHETERIZATION  2022    CARDIAC CATHETERIZATION Left 2022    Procedure: Left Heart Cath and coronary angiogram;  Surgeon: Haris Estrada MD;  Location: [facility] CATH INVASIVE LOCATION;  Service: Cardiovascular;  Laterality: Left;    CARDIAC CATHETERIZATION N/A 2022    Procedure: Percutaneous Coronary Intervention;  Surgeon: Haris Estrada MD;  Location: [facility] CATH INVASIVE LOCATION;  Service: Cardiovascular;  Laterality: N/A;    CORONARY STENT PLACEMENT  2022    Status post PCI of left circumflex, the culprit vessel with IVUS guidance    INTERVENTIONAL RADIOLOGY PROCEDURE N/A 2022    Procedure: Intravascular Ultrasound;  Surgeon: Haris Estrada MD;  Location: [facility] CATH INVASIVE LOCATION;  Service: Cardiovascular;  Laterality: N/A;      General Information       Row Name 25 1435          Physical Therapy Time and Intention    Document Type evaluation  -RR     Mode of Treatment physical therapy  -RR        Row Name 06/27/25 1435          General Information    Patient Profile Reviewed yes  -RR     Prior Level of Function independent:;all household mobility;community mobility;gait;transfer;ADL's;yard work;using stairs;driving  -RR     Existing Precautions/Restrictions fall  Wounds to dorsal surface of R toes  -RR     Barriers to Rehab none identified  -RR       Row Name 06/27/25 1435          Living Environment    Current Living Arrangements home  -RR     People in Home spouse  -RR       Row Name 06/27/25 1435          Home Main Entrance    Number of Stairs, Main Entrance two  -RR     Stair Railings, Main Entrance none  -RR       Row Name 06/27/25 1435          Stairs Within Home, Primary    Number of Stairs, Within Home, Primary none  -RR       Row Name 06/27/25 1435          Cognition    Orientation Status (Cognition) oriented x 4  -RR       Row Name 06/27/25 1435          Safety Issues/Impairments Affecting Functional Mobility    Comment, Safety Issues/Impairments (Mobility) reviewed the importance of appropriate footwear and daily skin checks.  He voiced understanding.  -RR               User Key  (r) = Recorded By, (t) = Taken By, (c) = Cosigned By      Initials Name Provider Type    RR Lyric Desouza, PT Physical Therapist                   Mobility       Row Name 06/27/25 1436          Bed Mobility    Bed Mobility bed mobility (all) activities  -RR     All Activities, [place] (Bed Mobility) independent  -RR       Row Name 06/27/25 1436          Bed-Chair Transfer    Bed-Chair [place] (Transfers) independent  -RR       Row Name 06/27/25 1436          Sit-Stand Transfer    Sit-Stand [place] (Transfers) independent  -RR       Row Name 06/27/25 1436          Gait/Stairs (Locomotion)    [place] Level (Gait) standby assist;independent  -RR     Patient was able to Ambulate yes  -RR     Distance in Feet (Gait) 240  -RR     Deviations/Abnormal Patterns (Gait) base of support, wide  gait  pattern consistent with sensory disturbance with neuropathy  -RR     [place] Level (Stairs) not tested  -RR               User Key  (r) = Recorded By, (t) = Taken By, (c) = Cosigned By      Initials Name Provider Type    RR Lyric Desouza PT Physical Therapist                   Obj/Interventions       Row Name 06/27/25 1437          Range of Motion Comprehensive    General Range of Motion no range of motion deficits identified  -RR       [facility] Name 06/27/25 1437          Strength Comprehensive (MMT)    General Manual Muscle Testing (MMT) Assessment no strength deficits identified  -RR       [facility] Name 06/27/25 1437          Balance    Balance Assessment sitting static balance;sitting dynamic balance;standing static balance;standing dynamic balance  -RR     Static Sitting Balance independent  -RR     Dynamic Sitting Balance independent  -RR     Position, Sitting Balance unsupported  -RR     Static Standing Balance independent  -RR     Dynamic Standing Balance independent;standby assist  -RR       [facility] Name 06/27/25 1437          Sensory Assessment (Somatosensory)    Sensory Assessment (Somatosensory) --  neuropathic; numbness and absent sensation in stocking pattern; unable to detect light touch on pedal surface of feet  -RR               User Key  (r) = Recorded By, (t) = Taken By, (c) = Cosigned By      Initials Name Provider Type    Lyric Rosales PT Physical Therapist                   Goals/Plan    No documentation.                  Clinical Impression       Row Name 06/27/25 1439          Pain    Pretreatment Pain Rating 0/10 - no pain  -RR     Posttreatment Pain Rating 0/10 - no pain  -RR       [facility] Name 06/27/25 1439          Plan of Care Review    Plan of Care Reviewed With patient  -RR     Outcome Evaluation Wilfredo Mcnamara is a 84 y.o. male with a CMH of Type 2 diabetes complicated with peripheral neuropathy, CAD/MI,hypertension, CKD stage III hyperlipidemia, who presented to [facility]  from [facility] on 6/26/2025 with right foot ulcer.  Located in the dorsal part of his 2nd and 3rd toe.  No radiographic evidence of acute OM.  He is cleared for therapy by nursing and reports he has been getting up for toileting needs while IP.  In his normal state, he is very active and independent within the home and community-- working outdoors in garden with intermittent walking stick.  He resides in a [facility] with 2 WILFRID with his spouse; is an active  and without significant falls history.  He demonstrates LE sensory deficits and associated gait disturbance but is independent with functional mobility and reports being at baseline.  Reviewed appropriate foot wear and daily skin checks.  He voiced understanding.  Functionally, the patient is at baseline and does not require skilled PT while IP, recommend frequent mobilization with nursing and return home with family once medically appropriate.  -RR       Row Name 06/27/25 1439          Therapy Assessment/Plan (PT)    Criteria for Skilled Interventions Met (PT) no  -RR     Therapy Frequency (PT) evaluation only  -RR       Row Name 06/27/25 1439          Positioning and Restraints    Post Treatment Position bed  -RR     In Bed exit alarm on;call light within reach  -RR               User Key  (r) = Recorded By, (t) = Taken By, (c) = Cosigned By      Initials Name Provider Type    RR Lyric Desouza, PT Physical Therapist                   Outcome Measures       Row Name 06/27/25 1443 06/27/25 0800       How much help from another person do you currently need...    Turning from your back to your side while in flat bed without using bedrails? 4  -RR 4  -AB    Moving from lying on back to sitting on the side of a flat bed without bedrails? 4  -RR 4  -AB    Moving to and from a bed to a chair (including a wheelchair)? 4  -RR 4  -AB    Standing up from a chair using your arms (e.g., wheelchair, bedside chair)? 4  -RR 4  -AB    Climbing 3-5 steps with a  railing? 4  -RR 4  -AB    To walk in hospital room? 4  -RR 4  -AB    AM-PAC 6 Clicks Score (PT) 24  -RR 24  -AB              User Key  (r) = Recorded By, (t) = Taken By, (c) = Cosigned By      Initials Name Provider Type    AB Hyacinth Zarco, RN Registered Nurse    Lyric Rosales, PT Physical Therapist                                 Physical Therapy Education       Title: PT OT SLP Therapies (Done)       Topic: Physical Therapy (Done)       Point: Mobility training (Done)       Learning Progress Summary            Patient Acceptance, E,TB, VU by RR at 6/27/2025 1443                      Point: Home exercise program (Done)       Learning Progress Summary            Patient Acceptance, E,TB, VU by RR at 6/27/2025 1443                                      User Key       Initials Effective Dates Name Provider Type Discipline     07/01/24 -  Lyric Desouza, GILBERT Physical Therapist PT                  PT Recommendation and Plan     Outcome Evaluation: Wilfredo Mcnamara is a 84 y.o. male with a CMH of Type 2 diabetes complicated with peripheral neuropathy, CAD/MI,hypertension, CKD stage III hyperlipidemia, who presented to [facility] from [facility] on 6/26/2025 with right foot ulcer.  Located in the dorsal part of his 2nd and 3rd toe.  No radiographic evidence of acute OM.  He is cleared for therapy by nursing and reports he has been getting up for toileting needs while IP.  In his normal state, he is very active and independent within the home and community-- working outdoors in garden with intermittent walking stick.  He resides in a [facility] with 2 WILFRID with his spouse; is an active  and without significant falls history.  He demonstrates LE sensory deficits and associated gait disturbance but is independent with functional mobility and reports being at baseline.  Reviewed appropriate foot wear and daily skin checks.  He voiced understanding.  Functionally, the patient is at baseline and does  not require skilled PT while IP, recommend frequent mobilization with nursing and return home with family once medically appropriate.     Time Calculation:         PT Charges       Row Name 06/27/25 1434             Time Calculation    Start Time 1123  -RR      Stop Time 1146  -RR      Time Calculation (min) 23 min  -RR      PT Received On 06/27/25  -RR                User Key  (r) = Recorded By, (t) = Taken By, (c) = Cosigned By      Initials Name Provider Type    RR Lyric Desouza, PT Physical Therapist                  Therapy Charges for Today       Code Description Service Date Service Provider Modifiers Qty    02680839524 HC PT EVAL MOD COMPLEXITY 4 6/27/2025 Lyric Desouza, PT GP 1            PT G-Codes  AM-PAC 6 Clicks Score (PT): 24  PT Discharge Summary  Anticipated Discharge Disposition (PT): home    Lryic Desouza PT  6/27/2025

## 2025-06-27 NOTE — CONSULTS
06/27/25   Foot and Ankle Surgery - Consult  Provider: Dr. Alberto sinha DPM  Location: [facility]    Subjective:  Wilfredo Mcnamara is a 84 y.o. male.  Who presents with right foot ulcerations after wearing new shoes and ulcerations present to the 2nd and 3rd digits.  He noticed increased redness to the area and presented to the emergency room.  He denies any fever, nausea or chills.  Labs taken show CRP is 0.  4 8, WBC of 5.3.  MRI showing no osseous abnormalities.    Chief Complaint   Patient presents with    Foot Problem     R toes infected. From a new pair of shoes previously. Has been trying to treat at home. Diabetic.       Consulting Physician: Manju Brown    Reason for Consult: Open ulceration to the right foot    No Known Allergies    Past Medical History:   Diagnosis Date    Diabetes     Essential hypertension     Hyperlipidemia 03/05/2013    Obstructive sleep apnea syndrome 05/02/2022       Past Surgical History:   Procedure Laterality Date    CARDIAC CATHETERIZATION  12/08/2022    CARDIAC CATHETERIZATION Left 12/8/2022    Procedure: Left Heart Cath and coronary angiogram;  Surgeon: Haris Estrada MD;  Location: [facility] INVASIVE LOCATION;  Service: Cardiovascular;  Laterality: Left;    CARDIAC CATHETERIZATION N/A 12/8/2022    Procedure: Percutaneous Coronary Intervention;  Surgeon: Haris Estrada MD;  Location: [facility] INVASIVE LOCATION;  Service: Cardiovascular;  Laterality: N/A;    CORONARY STENT PLACEMENT  12/07/2022    Status post PCI of left circumflex, the culprit vessel with IVUS guidance    INTERVENTIONAL RADIOLOGY PROCEDURE N/A 12/8/2022    Procedure: Intravascular Ultrasound;  Surgeon: Haris Estrada MD;  Location: [facility] INVASIVE LOCATION;  Service: Cardiovascular;  Laterality: N/A;       Family History   Family history unknown: Yes       Social History     Socioeconomic History    Marital status:    Tobacco Use    Smoking status: Never     Passive exposure:  Never    Smokeless tobacco: Never   Vaping Use    Vaping status: Never Used   Substance and Sexual Activity    Alcohol use: Not Currently    Drug use: Never    Sexual activity: Defer          Current Facility-Administered Medications:     acetaminophen (TYLENOL) tablet 650 mg, 650 mg, Oral, Q4H PRN **OR** acetaminophen (TYLENOL) 160 MG/5ML oral solution 650 mg, 650 mg, Oral, Q4H PRN **OR** acetaminophen (TYLENOL) suppository 650 mg, 650 mg, Rectal, Q4H PRN, Marco Antonio Todd MD    atorvastatin (LIPITOR) tablet 40 mg, 40 mg, Oral, Nightly, Manju Brown MD    sennosides-docusate (PERICOLACE) 8.6-50 MG per tablet 2 tablet, 2 tablet, Oral, BID PRN **AND** polyethylene glycol (MIRALAX) packet 17 g, 17 g, Oral, Daily PRN **AND** bisacodyl (DULCOLAX) EC tablet 5 mg, 5 mg, Oral, Daily PRN **AND** bisacodyl (DULCOLAX) suppository 10 mg, 10 mg, Rectal, Daily PRN, Marco Antonio Todd MD    Calcium Replacement - Follow Nurse / BPA Driven Protocol, , Not Applicable, PRN, Marco Antonio Todd MD    cefepime 1000 mg IVPB in 100 mL NS (MBP), 1,000 mg, Intravenous, Q12H, Manju Brown MD    clopidogrel (PLAVIX) tablet 75 mg, 75 mg, Oral, Daily, Manju Brown MD, 75 mg at 06/27/25 1719    dextrose (D50W) (25 g/50 mL) IV injection 25 g, 25 g, Intravenous, Q15 Min PRN, Marco Antonio Todd MD    dextrose (GLUTOSE) oral gel 15 g, 15 g, Oral, Q15 Min PRN, Marco Antonio Todd MD    gabapentin (NEURONTIN) capsule 600 mg, 600 mg, Oral, BID, Manju Brown MD    glucagon (GLUCAGEN) injection 1 mg, 1 mg, Intramuscular, Q15 Min PRN, Marco Antonio Todd MD    heparin (porcine) 5000 UNIT/ML injection 5,000 Units, 5,000 Units, Subcutaneous, Q8H, Marco Antonio Todd MD, 5,000 Units at 06/27/25 1459    HYDROmorphone (DILAUDID) injection 0.5 mg, 0.5 mg, Intravenous, Q2H PRN **AND** naloxone (NARCAN) injection 0.4 mg, 0.4 mg, Intravenous, Q5 Min PRN, Marco Antonio Todd MD    insulin glargine (LANTUS, SEMGLEE) injection 15 Units,  15 Units, Subcutaneous, Nightly, Manju Brown MD    insulin lispro (HUMALOG/ADMELOG) injection 2-7 Units, 2-7 Units, Subcutaneous, 4x Daily AC & at Bedtime, Marco Antonio Todd MD, 4 Units at 06/27/25 1151    losartan (COZAAR) tablet 25 mg, 25 mg, Oral, Daily, Manju Brown MD    Magnesium Standard Dose Replacement - Follow Nurse / BPA Driven Protocol, , Not Applicable, PRN, Marco Antonio Todd MD    morphine injection 1 mg, 1 mg, Intravenous, Q4H PRN **AND** naloxone (NARCAN) injection 0.4 mg, 0.4 mg, Intravenous, Q5 Min PRN, Marco Antonio Todd MD    multivitamin with minerals 1 tablet, 1 tablet, Oral, Daily, Manju Brown MD    nitroglycerin (NITROSTAT) SL tablet 0.4 mg, 0.4 mg, Sublingual, Q5 Min PRN, Marco Antonio Todd MD    Pharmacy to dose vancomycin, , Not Applicable, Continuous PRN, Manju Brown MD    Pharmacy To Dose: Cefepime, , Not Applicable, Continuous PRN, Manju Brown MD    Phosphorus Replacement - Follow Nurse / BPA Driven Protocol, , Not Applicable, PRN, Marco Antonio Todd MD    Potassium Replacement - Follow Nurse / BPA Driven Protocol, , Not Applicable, PRN, Marco Antonio Todd MD    sodium chloride 0.9 % flush 10 mL, 10 mL, Intravenous, Q12H, Marco Antonio Todd MD, 10 mL at 06/27/25 1118    sodium chloride 0.9 % flush 10 mL, 10 mL, Intravenous, PRN, Marco Antonio Todd MD    sodium chloride 0.9 % infusion 40 mL, 40 mL, Intravenous, PRN, Marco Antonio Todd MD    sodium chloride 0.9 % infusion, 100 mL/hr, Intravenous, Continuous, Marco Antonio Todd MD, Last Rate: 100 mL/hr at 06/27/25 1459, 100 mL/hr at 06/27/25 1459    [START ON 6/28/2025] Vancomycin HCl 1,250 mg in sodium chloride 0.9 % 250 mL VTB, 1,250 mg, Intravenous, Once, Manju Brown MD    vitamin B-12 (CYANOCOBALAMIN) tablet 2,500 mcg, 2,500 mcg, Oral, Daily, Manju Brown MD    Review of Systems:  General: Denies fever, chills, fatigue, and weakness.  Eyes: Denies vision loss, blurry  "vision, and excessive redness.  ENT: Denies hearing issues and difficulty swallowing.  Cardiovascular: Denies palpitations, chest pain, or syncopal episodes.  Respiratory: Denies shortness of breath, wheezing, and coughing.  GI: Denies abdominal pain, nausea, and vomiting.   : Denies frequency, hematuria, and urgency.  Musculoskeletal: Denies muscle cramps, joint pains, and stiffness.  Derm: Denies rash, open wounds, or suspicious lesions.  Neuro: Denies headaches, numbness, loss of coordination, and tremors.  Psych: Denies anxiety and depression.  Endocrine: Denies temperature intolerance and changes in appetite.  Heme: Denies bleeding disorders or abnormal bruising.     Objective   /73   Pulse 70   Temp 97.8 °F (36.6 °C)   Resp 14   Ht 193 cm (75.98\")   Wt 107 kg (235 lb 7.2 oz)   SpO2 95%   BMI 28.67 kg/m²     Foot/Ankle Exam        Derm: Superficial ulcerations level of fat layer with erythema present and a stable eschar overlying.  No purulent drainage, no malodor, no fluctuance.  VASc: Pulses are 2 out of 4 bilaterally for dorsalis pedis and posterior tibial arteries.  CFT is less than 2 seconds bilaterally  Neuro: Light touch sensation is absent  MSK: Hammertoe deformity with pes cavus foot structure.    Results from last 7 days   Lab Units 06/27/25  0052   WBC 10*3/mm3 5.31   HEMOGLOBIN g/dL 12.2*   HEMATOCRIT % 37.9   PLATELETS 10*3/mm3 170       Assessment & Plan     Diabetic foot ulcer associated with type 2 diabetes mellitus    Diabetic foot ulcer    Diabetic foot ulceration level subcutaneous tissue, right  Cellulitis, right      MRI showing no deep infection, no surgical intervention planned at this time.  Will elect for conservative treatment with wound care followed outpatient.  Patient will call and schedule an appointment this week.  Okay to DC from podiatric standpoint on doxycycline.  Will follow cultures outpatient.    Patient to paint the area with Betadine daily and keep dry.  " Patient can ambulate in open toed shoes.  DC orders in epic      Thank you for the consultation and allowing me to participate in this patient's care. Please call with any additional questions or concerns.     Note is dictated utilizing voice recognition software. Unfortunately this leads to occasional typographical errors. I apologize in advance if the situation occurs. If questions occur please do not hesitate to call our office.

## 2025-06-27 NOTE — PLAN OF CARE
Goal Outcome Evaluation:  Plan of Care Reviewed With: patient           Outcome Evaluation: 84 y.o. male with a CMH of Type 2 diabetes complicated with peripheral neuropathy, CAD/MI,hypertension, CKD stage III hyperlipidemia who presented to [facility] from [facility] on 6/26/2025 with right foot ulcer in the dorsal portion of 2nd and third toes. X-ray of the right foot showed prominent soft tissue edema at the fifth MTP joint. No radiographic evidence of acute osteomyelitis. At baseline, pt lives with his spouse in a [facility] with 2 WILFRID. He reports they are moving soon, however his new home also is a [facility]. He is an active , and is IND with ADL care, functional mobility, and is active with yard work and gardening. This date, pt A&Ox4. He appears to be functioning at his baseline level. He is IND with bed mobility and transfers, and requires SBA for all functional mobility. He requires excess time for ADL care but does so with independence and this is likely his baseline function. As he is at baseline, OT will complete orders. Educated pt to be cautious and closely tend to feet due to hx diabetes. He reports understanding. Safe to dc home when stable.    Anticipated Discharge Disposition (OT): home

## 2025-06-27 NOTE — ED NOTES
Spoke w/ Dr Todd, hospitalist regarding pt. Pt reassessed at 710pm also w/o needs and no change in R foot appearance/increased cellulitis. Discussed need for admit as pt wondering about outpt management. Reaffirmed importance of admission as had already been discussed by previous provider.      Janice Cotter MD  06/26/25 1083

## 2025-06-27 NOTE — THERAPY EVALUATION
Patient Name: Wilfredo Mcnamara  : 1940    MRN: [id number]                              Today's Date: 2025       Admit Date: 2025    Visit Dx:     ICD-10-CM ICD-9-CM   1. Diabetic foot infection  E11.628 250.80    L08.9 686.9     Patient Active Problem List   Diagnosis    Polyosteoarthritis    Hyperlipidemia    Type 2 diabetes mellitus    NSTEMI (non-ST elevated myocardial infarction)    Obstructive sleep apnea syndrome    Essential hypertension    Overweight (BMI 25.0-29.9)    Coronary artery disease involving native coronary artery of native heart with unstable angina pectoris    Cellulitis, unspecified cellulitis site    Aneurysm of ascending aorta without rupture    Diabetic foot ulcer associated with type 2 diabetes mellitus    Diabetic foot ulcer     Past Medical History:   Diagnosis Date    Diabetes     Essential hypertension     Hyperlipidemia 2013    Obstructive sleep apnea syndrome 2022     Past Surgical History:   Procedure Laterality Date    CARDIAC CATHETERIZATION  2022    CARDIAC CATHETERIZATION Left 2022    Procedure: Left Heart Cath and coronary angiogram;  Surgeon: Haris Estrada MD;  Location: [facility] CATH INVASIVE LOCATION;  Service: Cardiovascular;  Laterality: Left;    CARDIAC CATHETERIZATION N/A 2022    Procedure: Percutaneous Coronary Intervention;  Surgeon: Haris Estrada MD;  Location: [facility] CATH INVASIVE LOCATION;  Service: Cardiovascular;  Laterality: N/A;    CORONARY STENT PLACEMENT  2022    Status post PCI of left circumflex, the culprit vessel with IVUS guidance    INTERVENTIONAL RADIOLOGY PROCEDURE N/A 2022    Procedure: Intravascular Ultrasound;  Surgeon: Haris Estrada MD;  Location: [facility] CATH INVASIVE LOCATION;  Service: Cardiovascular;  Laterality: N/A;      General Information       Row Name 25 1632          OT Time and Intention    Document Type evaluation  -LS     Mode of Treatment occupational therapy  -LS       Row  Name 06/27/25 1632          General Information    Patient Profile Reviewed yes  -LS     Prior Level of Function independent:;ADL's;driving;all household mobility;community mobility;yard work  Uses a walking stick outside  -LS     Existing Precautions/Restrictions fall  -LS     Barriers to Rehab none identified  -LS       Row Name 06/27/25 1632          Living Environment    Current Living Arrangements home  -LS     People in Home spouse  -LS       Row Name 06/27/25 1632          Home Main Entrance    Number of Stairs, Main Entrance two  -LS       Row Name 06/27/25 1632          Cognition    Orientation Status (Cognition) oriented x 4  -LS               User Key  (r) = Recorded By, (t) = Taken By, (c) = Cosigned By      Initials Name Provider Type    LS Moi Clark OT Occupational Therapist                     Mobility/ADL's       Row Name 06/27/25 1632          Bed Mobility    Bed Mobility bed mobility (all) activities  -LS     All Activities, [place] (Bed Mobility) independent  -LS       Row Name 06/27/25 1632          Transfers    Transfers sit-stand transfer  -LS       Row Name 06/27/25 1632          Sit-Stand Transfer    Sit-Stand [place] (Transfers) independent  -LS       Row Name 06/27/25 1632          Functional Mobility    Functional Mobility- Ind. Level standby assist  -LS     Patient was able to Ambulate yes  -LS       Row Name 06/27/25 1632          Activities of Daily Living    BADL Assessment/Intervention other (see comments)  Excess time required but remains ind with ADLs  -LS               User Key  (r) = Recorded By, (t) = Taken By, (c) = Cosigned By      Initials Name Provider Type    LS Moi Clark OT Occupational Therapist                   Obj/Interventions       Row Name 06/27/25 1633          Sensory Assessment (Somatosensory)    Sensory Assessment BLE neuropathy  -LS       Row Name 06/27/25 1633          Vision Assessment/Intervention    Visual Impairment/Limitations WFL  -LS        Row Name 06/27/25 1633          Range of Motion Comprehensive    Comment, General Range of Motion Mild left shoulder deficit, otherwise grossly WFL  -LS       Row Name 06/27/25 1633          Strength Comprehensive (MMT)    Comment, General Manual Muscle Testing (MMT) Assessment BUEs functional. Left shoulder rotator cuff tear per pt report, thus not tested  -LS       Row Name 06/27/25 1633          Balance    Balance Assessment sitting static balance;sitting dynamic balance;standing static balance;standing dynamic balance  -LS     Static Sitting Balance independent  -LS     Dynamic Sitting Balance independent  -LS     Position, Sitting Balance unsupported;sitting edge of bed  -LS     Static Standing Balance independent  -LS     Dynamic Standing Balance standby assist  -LS     Position/Device Used, Standing Balance unsupported  -LS               User Key  (r) = Recorded By, (t) = Taken By, (c) = Cosigned By      Initials Name Provider Type    Moi Sy OT Occupational Therapist                   Goals/Plan    No documentation.                  Clinical Impression       Row Name 06/27/25 1634          Pain Assessment    Pretreatment Pain Rating 0/10 - no pain  -LS     Posttreatment Pain Rating 0/10 - no pain  -LS       Row Name 06/27/25 1634          Plan of Care Review    Plan of Care Reviewed With patient  -LS     Outcome Evaluation 84 y.o. male with a CMH of Type 2 diabetes complicated with peripheral neuropathy, CAD/MI,hypertension, CKD stage III hyperlipidemia who presented to [facility] from [facility] on 6/26/2025 with right foot ulcer in the dorsal portion of 2nd and third toes. X-ray of the right foot showed prominent soft tissue edema at the fifth MTP joint. No radiographic evidence of acute osteomyelitis. At baseline, pt lives with his spouse in a [facility] with 2 WILFRID. He reports they are moving soon, however his new home also is a [facility]. He is an active , and is IND with ADL  care, functional mobility, and is active with yard work and gardening. This date, pt A&Ox4. He appears to be functioning at his baseline level. He is IND with bed mobility and transfers, and requires SBA for all functional mobility. He requires excess time for ADL care but does so with independence and this is likely his baseline function. As he is at baseline, OT will complete orders. Educated pt to be cautious and closely tend to feet due to hx diabetes. He reports understanding. Safe to dc home when stable.  -LS       Row Name 06/27/25 1634          Therapy Assessment/Plan (OT)    Criteria for Skilled Therapeutic Interventions Met (OT) no;no problems identified which require skilled intervention  -LS     Therapy Frequency (OT) evaluation only  -LS     Predicted Duration of Therapy Intervention (OT) eval only  -LS       Row Name 06/27/25 1634          Therapy Plan Review/Discharge Plan (OT)    Anticipated Discharge Disposition (OT) home  -LS       Row Name 06/27/25 1634          Vital Signs    O2 Delivery Pre Treatment room air  -LS     O2 Delivery Intra Treatment room air  -LS     O2 Delivery Post Treatment room air  -LS     Pre Patient Position Supine  -LS     Intra Patient Position Standing  -LS     Post Patient Position Supine  -LS       Row Name 06/27/25 1634          Positioning and Restraints    Pre-Treatment Position in bed  -LS     Post Treatment Position bed  -LS     In Bed notified nsg;supine;call light within reach;encouraged to call for assist  -LS               User Key  (r) = Recorded By, (t) = Taken By, (c) = Cosigned By      Initials Name Provider Type    Moi Sy, ELIZA Occupational Therapist                   Outcome Measures       Row Name 06/27/25 4001          How much help from another is currently needed...    Putting on and taking off regular lower body clothing? 4  -LS     Bathing (including washing, rinsing, and drying) 4  -LS     Toileting (which includes using toilet bed pan or  urinal) 4  -LS     Putting on and taking off regular upper body clothing 4  -LS     Taking care of personal grooming (such as brushing teeth) 4  -LS     Eating meals 4  -LS     AM-PAC 6 Clicks Score (OT) 24  -LS       Row Name 06/27/25 1443 06/27/25 0800       How much help from another person do you currently need...    Turning from your back to your side while in flat bed without using bedrails? 4  -RR 4  -AB    Moving from lying on back to sitting on the side of a flat bed without bedrails? 4  -RR 4  -AB    Moving to and from a bed to a chair (including a wheelchair)? 4  -RR 4  -AB    Standing up from a chair using your arms (e.g., wheelchair, bedside chair)? 4  -RR 4  -AB    Climbing 3-5 steps with a railing? 4  -RR 4  -AB    To walk in hospital room? 4  -RR 4  -AB    AM-PAC 6 Clicks Score (PT) 24  -RR 24  -AB      Row Name 06/27/25 1637          Functional Assessment    Outcome Measure Options AM-PAC 6 Clicks Daily Activity (OT)  -               User Key  (r) = Recorded By, (t) = Taken By, (c) = Cosigned By      Initials Name Provider Type    AB Hyacinth Zarco, RN Registered Nurse    Moi Sy OT Occupational Therapist    Lyric Rosales, PT Physical Therapist                    Occupational Therapy Education       Title: PT OT SLP Therapies (Done)       Topic: Occupational Therapy (Done)       Point: ADL training (Done)       Learning Progress Summary            Patient Acceptance, E,TB, VU by  at 6/27/2025 1638                      Point: Precautions (Done)       Learning Progress Summary            Patient Acceptance, E,TB, VU by  at 6/27/2025 1638                      Point: Body mechanics (Done)       Learning Progress Summary            Patient Acceptance, E,TB, VU by  at 6/27/2025 1638                                      User Key       Initials Effective Dates Name Provider Type Discipline     09/22/22 -  Moi Clark OT Occupational Therapist OT                  OT  Recommendation and Plan  Therapy Frequency (OT): evaluation only  Plan of Care Review  Plan of Care Reviewed With: patient  Outcome Evaluation: 84 y.o. male with a CMH of Type 2 diabetes complicated with peripheral neuropathy, CAD/MI,hypertension, CKD stage III hyperlipidemia who presented to [facility] from [facility] on 6/26/2025 with right foot ulcer in the dorsal portion of 2nd and third toes. X-ray of the right foot showed prominent soft tissue edema at the fifth MTP joint. No radiographic evidence of acute osteomyelitis. At baseline, pt lives with his spouse in a [facility] with 2 WILFRID. He reports they are moving soon, however his new home also is a [facility]. He is an active , and is IND with ADL care, functional mobility, and is active with yard work and gardening. This date, pt A&Ox4. He appears to be functioning at his baseline level. He is IND with bed mobility and transfers, and requires SBA for all functional mobility. He requires excess time for ADL care but does so with independence and this is likely his baseline function. As he is at baseline, OT will complete orders. Educated pt to be cautious and closely tend to feet due to hx diabetes. He reports understanding. Safe to dc home when stable.     Time Calculation:         Time Calculation- OT       Row Name 06/27/25 1638             Time Calculation- OT    OT Start Time 1121  -LS      OT Stop Time 1146  -LS      OT Time Calculation (min) 25 min  -LS      OT Received On 06/27/25  -LS         Untimed Charges    OT Eval/Re-eval Minutes 25  -LS         Total Minutes    Untimed Charges Total Minutes 25  -LS       Total Minutes 25  -LS                User Key  (r) = Recorded By, (t) = Taken By, (c) = Cosigned By      Initials Name Provider Type    Moi Sy OT Occupational Therapist                  Therapy Charges for Today       Code Description Service Date Service Provider Modifiers Qty    [id number] HC OT EVAL LOW COMPLEXITY 4  6/27/2025 Moi Clark, OT GO 1                 Moi Clark OT  6/27/2025

## 2025-06-27 NOTE — PROGRESS NOTES
"Pharmacy Antimicrobial Dosing Service  Subjective:  Wilfredo Mcnamara is a 84 y.o.male admitted with DFI. Pharmacy has been consulted to dose Vancomycin and Cefepime for DFI/SSTI.    PMH: T2DM, Peripherial Neuropathy, AMI, CKD-III, HLD, HTN  Assessment/Plan    1. Day #1 Vancomycin: Pulse dosing d/t renal dysfxn. SCr 1.68 this AM.  PT is CKD-III with stable SCr 1.4-1.8 for years past.  Will give Vanc 2250 (21 mg/kg ABW) ONCE, followed by 1250mg (11.7 mg/kg ABW) ONCE on Saturday.  Will get level with AM labs on 6/29 and consider redosing when levels are expected to be <20.    Of note: per [employer] bayesian calculations, Vanc 1250mg q24h should produce an expected AUC of 540 mg*h/L.  If renal fxn remains stable, can consider changing to AUC dosing when able.    2. Day #1 Cefepime: 1g IV EI q12h for estCrCl 30-59 mL/min.    Will continue to monitor drug levels, renal function, culture and sensitivities, and patient clinical status.       Objective:  Relevant clinical data and objective history reviewed:  193 cm (75.98\")   107 kg (235 lb 7.2 oz)   Ideal body weight: 86.8 kg (191 lb 4.5 oz)  Adjusted ideal body weight: 94.8 kg (208 lb 15.2 oz)  Body mass index is 28.67 kg/m².      Results from last 7 days   Lab Units 06/27/25  0052 06/26/25  1827   CREATININE mg/dL 1.68* 1.66*     Estimated Creatinine Clearance: 45.5 mL/min (A) (by C-G formula based on SCr of 1.68 mg/dL (H)).  No intake/output data recorded.    Results from last 7 days   Lab Units 06/27/25 0052 06/26/25  1827   WBC 10*3/mm3 5.31 5.51     Temperature    06/26/25 2129 06/26/25 2330 06/27/25 0300   Temp: 97.8 °F (36.6 °C) 97.9 °F (36.6 °C) 97.7 °F (36.5 °C)     Baseline culture/source/susceptibility:  Microbiology Results (last 10 days)       Procedure Component Value - Date/Time    COVID-19 and FLU A/B PCR, 1 HR TAT - Swab, Nasopharynx [[id number]]  (Normal) Collected: 06/26/25 2019    Lab Status: Final result Specimen: Swab from Nasopharynx Updated: 06/26/25 " 2039     COVID19 Not Detected     Influenza A PCR Not Detected     Influenza B PCR Not Detected    Narrative:      Fact sheet for providers: https://www.fda.gov/media/958905/download    Fact sheet for patients: https://www.fda.gov/media/173077/download    Test performed by PCR.    RSV PCR - Swab, Nasopharynx [[id number]]  (Normal) Collected: 06/26/25 2019    Lab Status: Final result Specimen: Swab from Nasopharynx Updated: 06/26/25 2039     RSV, PCR Not Detected          Wilfredo Wilhelm RPH  06/27/25 10:08 EDT

## 2025-06-27 NOTE — CASE MANAGEMENT/SOCIAL WORK
Discharge Planning Assessment   Charles     Patient Name: Wilfredo Mcnamara  MRN: [id number]  Today's Date: 6/27/2025    Admit Date: 6/26/2025    Plan: BOOM PLAN: From Routine home with wife. [facility] following for possible IV abx.       Discharge Needs Assessment       Row Name 06/27/25 1355       Living Environment    People in Home spouse    Current Living Arrangements home    Potentially Unsafe Housing Conditions none    In the past 12 months has the electric, gas, oil, or water company threatened to shut off services in your home? No    Primary Care Provided by self    Provides Primary Care For no one    Family Caregiver if Needed spouse    Quality of Family Relationships helpful;involved;supportive    Able to Return to Prior Arrangements yes       Resource/Environmental Concerns    Resource/Environmental Concerns none    Transportation Concerns none       Transportation Needs    In the past 12 months, has lack of transportation kept you from medical appointments or from getting medications? no    In the past 12 months, has lack of transportation kept you from meetings, work, or from getting things needed for daily living? No       Food Insecurity    Within the past 12 months, you worried that your food would run out before you got the money to buy more. Never true    Within the past 12 months, the food you bought just didn't last and you didn't have money to get more. Never true       Transition Planning    Patient/Family Anticipates Transition to home with family    Patient/Family Anticipated Services at Transition none    Transportation Anticipated car, drives self;family or friend will provide       Discharge Needs Assessment    Readmission Within the Last 30 Days no previous admission in last 30 days    Equipment Currently Used at Home cpap;glucometer;cane, straight;walker, standard    Anticipated Changes Related to Illness none    Equipment Needed After Discharge none                   Discharge Plan        Row Name 06/27/25 1356       Plan    Plan DC PLAN: From Routine home with wife. [facility] following for possible IV abx.    Patient/Family in Agreement with Plan yes    Plan Comments CM met with patient at bedside, from routine home with wife. Independent with ADL's uses cane or stick when outside. PCP is [facility], Pharmacy is [facility]. Agreeable to Meds to Beds. Able to afford medications. Denies any issues with food or utilities. Denies any transportatin issues, still drives and family can provide at time of discharge. Denies any HHC or SNF nneds. DCP report sent to [facility], Message sent to liasion to follow along for possible need for IV abx. Verbalized understanding. Pending MRI, Pending Podiatry consult.                  Continued Care and Services - Admitted Since 6/26/2025       Dialysis/Infusion       Service Provider Request Status Services Address Phone Fax Patient Preferred    [facility] Pending - Request Sent -- [phone] [address] [phone] --                  Expected Discharge Date and Time       Expected Discharge Date Expected Discharge Time    Jun 28, 2025            Demographic Summary       Row Name 06/27/25 1354       General Information    Admission Type inpatient    Arrived From emergency department    Required Notices Provided Important Message from Medicare    Referral Source admission list    Reason for Consult discharge planning    Preferred Language English       Contact Information    Permission Granted to Share Info With     Contact Information Obtained for                    Functional Status       Row Name 06/27/25 1354       Functional Status    Usual Activity Tolerance excellent    Current Activity Tolerance excellent       Physical Activity    On average, how many days per week do you engage in moderate to strenuous exercise (like a brisk walk)? 0 days    On average, how many minutes do you  engage in exercise at this level? 0 min    Number of minutes of exercise per week 0       Assessment of Health Literacy    How often do you have someone help you read hospital materials? Sometimes    How often do you have problems learning about your medical condition because of difficulty understanding written information? Sometimes    How often do you have a problem understanding what is told to you about your medical condition? Sometimes       Functional Status, IADL    Medications independent    Meal Preparation independent    Housekeeping independent    Laundry independent    Shopping independent       Mental Status    General Appearance WDL WDL       Mental Status Summary    Recent Changes in Mental Status/Cognitive Functioning no changes                       Met with patient in room wearing PPE:     Maintained distance greater than six feet and spent less than 15 minutes in the room.    Marlyn Bagley RN   Case Management  [phone]

## 2025-06-27 NOTE — H&P
[facility] Medicine Services  History & Physical    Patient Name: Wilfredo Mcnamara  : 1940  MRN: [id number]  Primary Care Physician:  Nigel Tinoco MD  Date of admission: 2025  Date and Time of Service: 2025 at 2200    Subjective      Chief Complaint: Right foot ulcer    History of Present Illness: Wilfredo Mcnamara is a 84 y.o. male with a CMH of Type 2 diabetes complicated with peripheral neuropathy, CAD/MI,hypertension, CKD stage III hyperlipidemia, who presented to [facility] from [facility] on 2025 with right foot ulcer.  Located in the dorsal part of his 2nd and 3rd toe.  Started approximately 3 days ago after purchasing a new shoe that was too tight for him was unable to fill the pain until today due to his history of peripheral neuropathy.  Gets his toenails cut regularly by podiatrist.  He denies fever, pain, joint pain, chills, nausea vomiting or diarrhea.  Also denies chest pain or shortness of breath.  Hemodynamically stable, creatinine 1.66, serum glucose 2.65, ESR and CRP within normal limits.  X-ray of the right foot showed prominent soft tissue edema at the fifth MTP joint.  No radiographic evidence of acute osteomyelitis.  Received clindamycin and 0.5 l bolus NS.  Also received a Tdap.  Transferred to [facility] for further management.          Review of Systems   Constitutional:  Negative for chills, fatigue and fever.   HENT:  Negative for congestion, facial swelling and mouth sores.    Respiratory:  Negative for cough, chest tightness, shortness of breath and wheezing.    Cardiovascular:  Negative for chest pain and leg swelling.   Gastrointestinal:  Negative for abdominal distention, diarrhea, nausea and vomiting.   Genitourinary:  Negative for dysuria.   Musculoskeletal:  Negative for joint swelling and myalgias.   Skin:  Positive for color change and wound (R FOOT).   Neurological:  Negative for dizziness and headaches.    Psychiatric/Behavioral:  Negative for agitation, behavioral problems and confusion.        Personal History     Past Medical History:   Diagnosis Date    Diabetes     Essential hypertension     Hyperlipidemia 03/05/2013    Obstructive sleep apnea syndrome 05/02/2022       Past Surgical History:   Procedure Laterality Date    CARDIAC CATHETERIZATION  12/08/2022    CARDIAC CATHETERIZATION Left 12/8/2022    Procedure: Left Heart Cath and coronary angiogram;  Surgeon: Haris Estrada MD;  Location: [facility] CATH INVASIVE LOCATION;  Service: Cardiovascular;  Laterality: Left;    CARDIAC CATHETERIZATION N/A 12/8/2022    Procedure: Percutaneous Coronary Intervention;  Surgeon: Haris Estrada MD;  Location: [facility] CATH INVASIVE LOCATION;  Service: Cardiovascular;  Laterality: N/A;    CORONARY STENT PLACEMENT  12/07/2022    Status post PCI of left circumflex, the culprit vessel with IVUS guidance    INTERVENTIONAL RADIOLOGY PROCEDURE N/A 12/8/2022    Procedure: Intravascular Ultrasound;  Surgeon: Haris Estrada MD;  Location: [facility] CATH INVASIVE LOCATION;  Service: Cardiovascular;  Laterality: N/A;       Family History: Family history is unknown by patient. Otherwise pertinent FHx was reviewed and not pertinent to current issue.    Social History:  reports that he has never smoked. He has never been exposed to tobacco smoke. He has never used smokeless tobacco. He reports that he does not currently use alcohol. He reports that he does not use drugs.    Home Medications:  Prior to Admission Medications       Prescriptions Last Dose Informant Patient Reported? Taking?    atorvastatin (LIPITOR) 40 MG tablet   No Yes    Take 1 tablet by mouth Every Night.    clopidogrel (PLAVIX) 75 MG tablet   No Yes    Take 1 tablet by mouth Daily.    fexofenadine (ALLEGRA) 180 MG tablet  Self Yes Yes    Take 1 tablet by mouth Daily.    gabapentin (NEURONTIN) 300 MG capsule   Yes Yes    Take 2 capsules by mouth 2 (Two) Times a Day.    glimepiride  (AMARYL) 2 MG tablet   Yes Yes    Take 1 tablet by mouth Daily.    losartan (COZAAR) 25 MG tablet   Yes Yes    Take 1 tablet by mouth Daily.    metFORMIN (GLUCOPHAGE) 500 MG tablet   Yes Yes    Take 2 tablets by mouth 2 (Two) Times a Day With Meals.    multivitamin with minerals tablet tablet   Yes Yes    Take 1 tablet by mouth Daily.    nitroglycerin (NITROSTAT) 0.4 MG SL tablet   No Yes    Place 1 tablet under the tongue Every 5 (Five) Minutes As Needed for Chest Pain (Only if SBP Greater Than 100). Take no more than 3 doses in 15 minutes.    NON FORMULARY  Self Yes Yes    Take 1 tablet by mouth Daily. Magnesium complete    vitamin B-12 (CYANOCOBALAMIN) 1000 MCG tablet   Yes Yes    Take 2.5 tablets by mouth Daily.    FREESTYLE LITE test strip   Yes No    1 each by Other route Daily. use to test blood sugar once daily              Allergies:  No Known Allergies    Objective      Vitals:   Temp:  [97.6 °F (36.4 °C)-97.9 °F (36.6 °C)] 97.9 °F (36.6 °C)  Heart Rate:  [65-70] 65  Resp:  [15-16] 16  BP: (138-148)/(69-78) 148/69  Body mass index is 28.67 kg/m².  Physical Exam  Constitutional:       Appearance: Normal appearance.   HENT:      Head: Normocephalic.      Right Ear: Tympanic membrane normal.      Left Ear: Tympanic membrane normal.      Mouth/Throat:      Mouth: Mucous membranes are moist.   Eyes:      Pupils: Pupils are equal, round, and reactive to light.   Cardiovascular:      Rate and Rhythm: Normal rate and regular rhythm.      Heart sounds: No murmur heard.  Pulmonary:      Effort: No respiratory distress.      Breath sounds: No wheezing.   Abdominal:      General: There is no distension.      Palpations: There is no mass.   Musculoskeletal:         General: Normal range of motion.   Skin:     General: Skin is warm and dry.      Capillary Refill: Capillary refill takes less than 2 seconds.      Findings: Erythema present.      Comments: Right 2nd and 3rd toe ulcerations on the dorsal surface    Neurological:      General: No focal deficit present.      Mental Status: He is alert and oriented to person, place, and time.         Diagnostic Data:  Lab Results (last 24 hours)       Procedure Component Value Units Date/Time    STAT Lactic Acid, Reflex [[id number]]  (Normal) Collected: 06/26/25 2045    Specimen: Blood Updated: 06/26/25 2102     Lactate 1.4 mmol/L     RSV PCR - Swab, Nasopharynx [[id number]]  (Normal) Collected: 06/26/25 2019    Specimen: Swab from Nasopharynx Updated: 06/26/25 2039     RSV, PCR Not Detected    COVID-19 and FLU A/B PCR, 1 HR TAT - Swab, Nasopharynx [[id number]]  (Normal) Collected: 06/26/25 2019    Specimen: Swab from Nasopharynx Updated: 06/26/25 2039     COVID19 Not Detected     Influenza A PCR Not Detected     Influenza B PCR Not Detected    Narrative:      Fact sheet for providers: https://www.fda.gov/media/692960/download    Fact sheet for patients: https://www.fda.gov/media/295218/download    Test performed by PCR.    C-reactive Protein [[id number]]  (Normal) Collected: 06/26/25 1827    Specimen: Blood Updated: 06/26/25 2011     C-Reactive Protein 0.48 mg/dL     Sedimentation Rate [[id number]]  (Normal) Collected: 06/26/25 1827    Specimen: Blood Updated: 06/26/25 2000     Sed Rate 19 mm/hr     Comprehensive Metabolic Panel [[id number]]  (Abnormal) Collected: 06/26/25 1827    Specimen: Blood Updated: 06/26/25 1907     Glucose 265 mg/dL      BUN 30.2 mg/dL      Creatinine 1.66 mg/dL      Sodium 133 mmol/L      Potassium 4.3 mmol/L      Chloride 101 mmol/L      CO2 23.1 mmol/L      Calcium 9.7 mg/dL      Total Protein 6.3 g/dL      Albumin 4.0 g/dL      ALT (SGPT) 15 U/L      AST (SGOT) 17 U/L      Alkaline Phosphatase 101 U/L      Total Bilirubin 0.7 mg/dL      Globulin 2.3 gm/dL      A/G Ratio 1.7 g/dL      BUN/Creatinine Ratio 18.2     Anion Gap 8.9 mmol/L      eGFR 40.9 mL/min/1.73     Narrative:      GFR Categories in Chronic Kidney Disease (CKD)              GFR Category           GFR (mL/min/1.73)    Interpretation  G1                    90 or greater        Normal or high (1)  G2                    60-89                Mild decrease (1)  G3a                   45-59                Mild to moderate decrease  G3b                   30-44                Moderate to severe decrease  G4                    15-29                Severe decrease  G5                    14 or less           Kidney failure    (1)In the absence of evidence of kidney disease, neither GFR category G1 or G2 fulfill the criteria for CKD.    eGFR calculation 2021 CKD-EPI creatinine equation, which does not include race as a factor    Lactic Acid, Plasma [[id number]]  (Abnormal) Collected: 06/26/25 1827    Specimen: Blood Updated: 06/26/25 1854     Lactate 2.1 mmol/L     CBC & Differential [[id number]]  (Abnormal) Collected: 06/26/25 1827    Specimen: Blood Updated: 06/26/25 1836    Narrative:      The following orders were created for panel order CBC & Differential.  Procedure                               Abnormality         Status                     ---------                               -----------         ------                     CBC Auto Differential[[id number]]        Abnormal            Final result                 Please view results for these tests on the individual orders.    CBC Auto Differential [[id number]]  (Abnormal) Collected: 06/26/25 1827    Specimen: Blood Updated: 06/26/25 1836     WBC 5.51 10*3/mm3      RBC 3.93 10*6/mm3      Hemoglobin 12.3 g/dL      Hematocrit 37.6 %      MCV 95.7 fL      MCH 31.3 pg      MCHC 32.7 g/dL      RDW 13.4 %      RDW-SD 47.3 fl      MPV 10.2 fL      Platelets 167 10*3/mm3      Neutrophil % 64.0 %      Lymphocyte % 20.3 %      Monocyte % 13.4 %      Eosinophil % 1.6 %      Basophil % 0.5 %      Immature Grans % 0.2 %      Neutrophils, Absolute 3.52 10*3/mm3      Lymphocytes, Absolute 1.12 10*3/mm3      Monocytes, Absolute 0.74 10*3/mm3      Eosinophils, Absolute 0.09  10*3/mm3      Basophils, Absolute 0.03 10*3/mm3      Immature Grans, Absolute 0.01 10*3/mm3              Imaging Results (Last 24 Hours)       Procedure Component Value Units Date/Time    XR Foot 3+ View Right [[id number]] Collected: 06/26/25 1944     Updated: 06/26/25 1949    Narrative:      XR FOOT 3+ VW RIGHT    Date of Exam: 6/26/2025 7:02 PM EDT    Indication: Diabetic foot infection, rule out osteomyelitis of toes    Comparison: None available.    Findings:  No acute fracture or malalignment. . There is prominent soft tissue edema centered at the fifth MTP joint. No visible aggressive periostitis or erosions.    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 Vascular calcifications.        Impression:      Impression:  Prominent soft tissue edema centered at the fifth MTP joint, likely corresponding to reported infection. No radiographic evidence of acute osteomyelitis. If there is persistent clinical concern for osteomyelitis, MRI is more sensitive.        Electronically Signed: Frank Wood MD    6/26/2025 7:46 PM EDT    Workstation ID: [id number]              Assessment & Plan        This is a 84 y.o. male with:    Active and Resolved Problems  Active Hospital Problems    Diagnosis  POA    **Diabetic foot ulcer associated with type 2 diabetes mellitus [E11.621, L97.509]  Yes    Diabetic foot ulcer [E11.621, L97.509]  Yes      Resolved Hospital Problems   No resolved problems to display.       Diabetic right foot ulcer  Received clindamycin at [facility].  Start on cefepime  Wound consult  Lactic acid 2.1 initially resolved after half liter bolus  ESR CRP within normal limits  X-ray of the right foot showed prominent soft tissue edema at the fifth MTP joint. No radiographic evidence of acute osteomyelitis.  IV fluids  Multimodal pain management    Type 2 diabetes complicated with peripheral neuropathy  Hemoglobin A1c in the a.m.  Sliding scale  insulin    CKD stage III  0.66 on presentation which is above baseline  History of nephrotoxins    CAD/MI  Continue Plavix     Hypertension  Resume home meds    Hyperlipidemia  Resume home meds    Full code  DVT prophylaxis with heparin  Dispo: Pending clinical course      VTE Prophylaxis:  Pharmacologic VTE prophylaxis orders are present.        The patient desires to be as follows:    CODE STATUS:    Code Status (Patient has no pulse and is not breathing): CPR (Attempt to Resuscitate)  Medical Interventions (Patient has pulse or is breathing): Full Support        Carley Mcnamara, who can be contacted at [phone], is the designated person to make medical decisions on the patient's behalf if He is incapable of doing so. This was clarified with patient and/or next of kin on 6/26/2025 during the course of this H&P.    Admission Status:  I believe this patient meets inpatient status.    Expected Length of Stay: greater than 2 nights    PDMP and Medication Dispenses via Sidebar reviewed and consistent with patient reported medications.    I discussed the patient's findings and my recommendations with patient and nursing staff.      Signature:     This document has been electronically signed by Marco Antonio Todd MD on June 26, 2025 23:43 EDT   [facility] Hospitalist Team

## 2025-06-28 LAB
DEPRECATED RDW RBC AUTO: 45.5 FL (ref 37–54)
ERYTHROCYTE [DISTWIDTH] IN BLOOD BY AUTOMATED COUNT: 13.3 % (ref 12.3–15.4)
GLUCOSE BLDC GLUCOMTR-MCNC: 122 MG/DL (ref 70–105)
GLUCOSE BLDC GLUCOMTR-MCNC: 135 MG/DL (ref 70–105)
GLUCOSE BLDC GLUCOMTR-MCNC: 142 MG/DL (ref 70–105)
GLUCOSE BLDC GLUCOMTR-MCNC: 151 MG/DL (ref 70–105)
HCT VFR BLD AUTO: 37.9 % (ref 37.5–51)
HGB BLD-MCNC: 12.5 G/DL (ref 13–17.7)
MCH RBC QN AUTO: 30.8 PG (ref 26.6–33)
MCHC RBC AUTO-ENTMCNC: 33 G/DL (ref 31.5–35.7)
MCV RBC AUTO: 93.3 FL (ref 79–97)
PLATELET # BLD AUTO: 167 10*3/MM3 (ref 140–450)
PMV BLD AUTO: 10.8 FL (ref 6–12)
RBC # BLD AUTO: 4.06 10*6/MM3 (ref 4.14–5.8)
VANCOMYCIN SERPL-MCNC: 13.4 MCG/ML (ref 5–40)
WBC NRBC COR # BLD AUTO: 5.86 10*3/MM3 (ref 3.4–10.8)

## 2025-06-28 PROCEDURE — 25010000002 VANCOMYCIN HCL 1.25 G RECONSTITUTED SOLUTION 1 EACH VIAL: Performed by: INTERNAL MEDICINE

## 2025-06-28 PROCEDURE — 80202 ASSAY OF VANCOMYCIN: CPT | Performed by: INTERNAL MEDICINE

## 2025-06-28 PROCEDURE — 63710000001 INSULIN GLARGINE PER 5 UNITS: Performed by: INTERNAL MEDICINE

## 2025-06-28 PROCEDURE — 25010000002 CEFEPIME PER 500 MG: Performed by: INTERNAL MEDICINE

## 2025-06-28 PROCEDURE — 25010000002 HEPARIN (PORCINE) PER 1000 UNITS

## 2025-06-28 PROCEDURE — 80048 BASIC METABOLIC PNL TOTAL CA: CPT | Performed by: INTERNAL MEDICINE

## 2025-06-28 PROCEDURE — 25810000003 SODIUM CHLORIDE 0.9 % SOLUTION 250 ML FLEX CONT: Performed by: INTERNAL MEDICINE

## 2025-06-28 PROCEDURE — 63710000001 INSULIN LISPRO (HUMAN) PER 5 UNITS

## 2025-06-28 PROCEDURE — 85027 COMPLETE CBC AUTOMATED: CPT | Performed by: INTERNAL MEDICINE

## 2025-06-28 PROCEDURE — 82948 REAGENT STRIP/BLOOD GLUCOSE: CPT

## 2025-06-28 RX ADMIN — Medication 1 TABLET: at 08:59

## 2025-06-28 RX ADMIN — Medication 10 ML: at 20:38

## 2025-06-28 RX ADMIN — HEPARIN SODIUM 5000 UNITS: 5000 INJECTION INTRAVENOUS; SUBCUTANEOUS at 16:15

## 2025-06-28 RX ADMIN — Medication 2500 MCG: at 08:58

## 2025-06-28 RX ADMIN — HEPARIN SODIUM 5000 UNITS: 5000 INJECTION INTRAVENOUS; SUBCUTANEOUS at 22:13

## 2025-06-28 RX ADMIN — INSULIN LISPRO 2 UNITS: 100 INJECTION, SOLUTION INTRAVENOUS; SUBCUTANEOUS at 08:59

## 2025-06-28 RX ADMIN — GABAPENTIN 600 MG: 300 CAPSULE ORAL at 20:37

## 2025-06-28 RX ADMIN — CEFEPIME 1000 MG: 1 INJECTION, POWDER, FOR SOLUTION INTRAMUSCULAR; INTRAVENOUS at 08:58

## 2025-06-28 RX ADMIN — CLOPIDOGREL BISULFATE 75 MG: 75 TABLET, FILM COATED ORAL at 08:59

## 2025-06-28 RX ADMIN — ATORVASTATIN CALCIUM 40 MG: 40 TABLET, FILM COATED ORAL at 20:37

## 2025-06-28 RX ADMIN — GABAPENTIN 600 MG: 300 CAPSULE ORAL at 08:58

## 2025-06-28 RX ADMIN — VANCOMYCIN HYDROCHLORIDE 1250 MG: 1.25 INJECTION, POWDER, LYOPHILIZED, FOR SOLUTION INTRAVENOUS at 10:20

## 2025-06-28 RX ADMIN — HEPARIN SODIUM 5000 UNITS: 5000 INJECTION INTRAVENOUS; SUBCUTANEOUS at 05:50

## 2025-06-28 RX ADMIN — CEFEPIME 2000 MG: 2 INJECTION, POWDER, FOR SOLUTION INTRAVENOUS at 20:37

## 2025-06-28 RX ADMIN — LOSARTAN POTASSIUM 25 MG: 25 TABLET, FILM COATED ORAL at 08:58

## 2025-06-28 RX ADMIN — INSULIN GLARGINE 15 UNITS: 100 INJECTION, SOLUTION SUBCUTANEOUS at 20:37

## 2025-06-28 RX ADMIN — Medication 10 ML: at 09:02

## 2025-06-28 NOTE — PROGRESS NOTES
[facility] MEDICINE SERVICE  DAILY PROGRESS NOTE    NAME: Wilfredo Mcnamara  : 1940  MRN: [id number]      LOS: 2 days     PROVIDER OF SERVICE: Manju Brown MD    Chief Complaint: Diabetic foot ulcer associated with type 2 diabetes mellitus    Subjective:     Interval History:  History taken from: patient    No new complaint        Review of Systems:   Review of Systems   All other systems reviewed and are negative.      Objective:     Vital Signs  Temp:  [97.2 °F (36.2 °C)-98 °F (36.7 °C)] 98 °F (36.7 °C)  Heart Rate:  [64-72] 72  Resp:  [13-19] 19  BP: (145-161)/(71-78) 147/71   Body mass index is 28.67 kg/m².    Physical Exam  Physical Exam  Constitutional:       Appearance: Normal appearance.   HENT:      Head: Normocephalic and atraumatic.      Nose: Nose normal.      Mouth/Throat:      Mouth: Mucous membranes are moist.   Eyes:      Extraocular Movements: Extraocular movements intact.      Conjunctiva/sclera: Conjunctivae normal.      Pupils: Pupils are equal, round, and reactive to light.   Cardiovascular:      Rate and Rhythm: Normal rate and regular rhythm.      Pulses: Normal pulses.      Heart sounds: Normal heart sounds.   Pulmonary:      Effort: Pulmonary effort is normal.      Breath sounds: Normal breath sounds.   Abdominal:      General: Abdomen is flat. Bowel sounds are normal.      Palpations: Abdomen is soft.   Musculoskeletal:         General: Normal range of motion.      Cervical back: Normal range of motion and neck supple.   Skin:     General: Skin is warm and dry.      Capillary Refill: Capillary refill takes less than 2 seconds.   Neurological:      General: No focal deficit present.      Mental Status: He is alert and oriented to person, place, and time.   Psychiatric:         Mood and Affect: Mood normal.         Behavior: Behavior normal.         Thought Content: Thought content normal.         Judgment: Judgment normal.         Current Medications:  Scheduled  Meds:atorvastatin, 40 mg, Oral, Nightly  cefepime, 2,000 mg, Intravenous, Q12H  clopidogrel, 75 mg, Oral, Daily  gabapentin, 600 mg, Oral, BID  heparin (porcine), 5,000 Units, Subcutaneous, Q8H  insulin glargine, 15 Units, Subcutaneous, Nightly  insulin lispro, 2-7 Units, Subcutaneous, 4x Daily AC & at Bedtime  losartan, 25 mg, Oral, Daily  multivitamin with minerals, 1 tablet, Oral, Daily  sodium chloride, 10 mL, Intravenous, Q12H  vitamin B-12, 2,500 mcg, Oral, Daily      Continuous Infusions:Pharmacy to dose vancomycin,   Pharmacy To Dose:,       PRN Meds:.  acetaminophen **OR** acetaminophen **OR** acetaminophen    senna-docusate sodium **AND** polyethylene glycol **AND** bisacodyl **AND** bisacodyl    Calcium Replacement - Follow Nurse / BPA Driven Protocol    dextrose    dextrose    glucagon (human recombinant)    HYDROmorphone **AND** naloxone    Magnesium Standard Dose Replacement - Follow Nurse / BPA Driven Protocol    [] Morphine **AND** naloxone    nitroglycerin    Pharmacy to dose vancomycin    Pharmacy To Dose:    Phosphorus Replacement - Follow Nurse / BPA Driven Protocol    Potassium Replacement - Follow Nurse / BPA Driven Protocol    sodium chloride    sodium chloride       Diagnostic Data    Results from last 7 days   Lab Units 25  0559 25  0052   WBC 10*3/mm3 5.86 5.31   HEMOGLOBIN g/dL 12.5* 12.2*   HEMATOCRIT % 37.9 37.9   PLATELETS 10*3/mm3 167 170   GLUCOSE mg/dL  --  192*   CREATININE mg/dL  --  1.68*   BUN mg/dL  --  29.9*   SODIUM mmol/L  --  138   POTASSIUM mmol/L  --  4.4   AST (SGOT) U/L  --  24   ALT (SGPT) U/L  --  19   ALK PHOS U/L  --  93   BILIRUBIN mg/dL  --  0.6   ANION GAP mmol/L  --  12.6       MRI Foot Right Without Contrast  Result Date: 2025  Impression: No evidence of osteomyelitis or soft tissue abscess. Soft tissue edema throughout the visualized foot. Finding could represent cellulitis and/or dependent edema. Correlate clinically. Electronically  Signed: Miles Arguello MD  6/27/2025 4:52 PM EDT  Workstation ID: [id number]    XR Foot 3+ View Right  Result Date: 6/26/2025  Impression: Prominent soft tissue edema centered at the fifth MTP joint, likely corresponding to reported infection. No radiographic evidence of acute osteomyelitis. If there is persistent clinical concern for osteomyelitis, MRI is more sensitive. Electronically Signed: Frank Wood MD  6/26/2025 7:46 PM EDT  Workstation ID: [id number]        I reviewed the patient's new clinical results.    Assessment/Plan:     Active and Resolved Problems  Active Hospital Problems    Diagnosis  POA    **Diabetic foot ulcer associated with type 2 diabetes mellitus [E11.621, L97.509]  Yes    Diabetic foot ulcer [E11.621, L97.509]  Yes      Resolved Hospital Problems   No resolved problems to display.       Right diabetic foot ulcer  - Continue IV vancomycin and cefepime and follow-up on cultures.  Follow-up on MRI of the foot to rule out osteomyelitis.  Pending podiatry consult.    DM type II  - Blood close uncontrolled.  Start Lantus.  Continue lispro.  Continue home dose of Amaryl.  Continue to monitor blood glucose trend.  Check hemoglobin A1c level.    CKD stage III  - Stable creatinine.  Monitor.    Hypertension  - Resumed home antihypertensive medications.  Monitor the BP.    VTE Prophylaxis:  Pharmacologic VTE prophylaxis orders are present.       Disposition Planning:     Barriers to Discharge: Pending clinical improvement  Anticipated Date of Discharge: 7/2/2025  Place of Discharge: Home      Code Status and Medical Interventions: CPR (Attempt to Resuscitate); Full Support   Ordered at: 06/26/25 3306     Code Status (Patient has no pulse and is not breathing):    CPR (Attempt to Resuscitate)     Medical Interventions (Patient has pulse or is breathing):    Full Support       Signature: Electronically signed by Manju Brown MD, 06/28/25, 16:49 EDT.  [facility] Hospitalist Team

## 2025-06-28 NOTE — PLAN OF CARE
Goal Outcome Evaluation:                                            Problem: Adult Inpatient Plan of Care  Goal: Plan of Care Review  6/28/2025 1750 by Sabrina Frias LPN  Outcome: Progressing  6/28/2025 1329 by Sabrina Frias LPN  Outcome: Progressing  Goal: Patient-Specific Goal (Individualized)  6/28/2025 1750 by Sabrina Frias LPN  Outcome: Progressing  6/28/2025 1329 by Sabrina Frias LPN  Outcome: Progressing  Goal: Absence of Hospital-Acquired Illness or Injury  6/28/2025 1750 by Sabrina Frias LPN  Outcome: Progressing  6/28/2025 1329 by Sabrina Frias LPN  Outcome: Progressing  Intervention: Identify and Manage Fall Risk  Recent Flowsheet Documentation  Taken 6/28/2025 1630 by Sabrina Frias LPN  Safety Promotion/Fall Prevention:   safety round/check completed   room organization consistent   nonskid shoes/slippers when out of bed   mobility aid in reach   lighting adjusted   gait belt   fall prevention program maintained   clutter free environment maintained   assistive device/personal items within reach   activity supervised  Taken 6/28/2025 1408 by Sabrina Frias LPN  Safety Promotion/Fall Prevention:   safety round/check completed   room organization consistent   nonskid shoes/slippers when out of bed   mobility aid in reach   lighting adjusted   fall prevention program maintained   gait belt   clutter free environment maintained   assistive device/personal items within reach   activity supervised  Taken 6/28/2025 1257 by Sabrina Frias LPN  Safety Promotion/Fall Prevention:   safety round/check completed   room organization consistent   nonskid shoes/slippers when out of bed   mobility aid in reach   lighting adjusted   gait belt   fall prevention program maintained   clutter free environment maintained   activity supervised   assistive device/personal items within reach  Taken 6/28/2025 1037 by Sabrina Frias LPN  Safety Promotion/Fall Prevention:   safety round/check completed   room organization consistent   nonskid  shoes/slippers when out of bed   mobility aid in reach   lighting adjusted   fall prevention program maintained   gait belt   clutter free environment maintained   assistive device/personal items within reach   activity supervised  Taken 6/28/2025 0855 by Sabrina Frias LPN  Safety Promotion/Fall Prevention:   safety round/check completed   room organization consistent   nonskid shoes/slippers when out of bed   lighting adjusted   mobility aid in reach   gait belt   fall prevention program maintained   clutter free environment maintained   assistive device/personal items within reach   activity supervised  Intervention: Prevent Skin Injury  Recent Flowsheet Documentation  Taken 6/28/2025 1630 by Sabrina Frias LPN  Body Position: position changed independently  Taken 6/28/2025 1257 by Sabrina Frias LPN  Body Position: position changed independently  Taken 6/28/2025 0855 by Sabrina Frias LPN  Body Position: position changed independently  Intervention: Prevent and Manage VTE (Venous Thromboembolism) Risk  Recent Flowsheet Documentation  Taken 6/28/2025 1257 by Sabrina Frias LPN  VTE Prevention/Management:   bilateral   SCDs (sequential compression devices) off   patient refused intervention  Taken 6/28/2025 0855 by Sabrina Frias LPN  VTE Prevention/Management:   bilateral   SCDs (sequential compression devices) off  Intervention: Prevent Infection  Recent Flowsheet Documentation  Taken 6/28/2025 1630 by Sabrina Frias LPN  Infection Prevention:   single patient room provided   rest/sleep promoted   personal protective equipment utilized   equipment surfaces disinfected   environmental surveillance performed   cohorting utilized   hand hygiene promoted  Taken 6/28/2025 1408 by Sabrina Frias LPN  Infection Prevention:   single patient room provided   rest/sleep promoted   equipment surfaces disinfected   hand hygiene promoted   personal protective equipment utilized   environmental surveillance performed   cohorting  utilized  Taken 6/28/2025 1257 by Sabrina Frias LPN  Infection Prevention:   single patient room provided   rest/sleep promoted   personal protective equipment utilized   cohorting utilized   environmental surveillance performed   equipment surfaces disinfected   hand hygiene promoted  Taken 6/28/2025 1037 by Sabrina Frias LPN  Infection Prevention:   single patient room provided   rest/sleep promoted   equipment surfaces disinfected   personal protective equipment utilized   hand hygiene promoted   cohorting utilized   environmental surveillance performed  Taken 6/28/2025 0855 by Sabrina Frias LPN  Infection Prevention:   single patient room provided   rest/sleep promoted   personal protective equipment utilized   hand hygiene promoted   equipment surfaces disinfected   environmental surveillance performed   cohorting utilized  Goal: Optimal Comfort and Wellbeing  6/28/2025 1750 by Sabrina Frias LPN  Outcome: Progressing  6/28/2025 1329 by Sabrina Frias LPN  Outcome: Progressing  Intervention: Provide Person-Centered Care  Recent Flowsheet Documentation  Taken 6/28/2025 1630 by Sabrina Frias LPN  Trust Relationship/Rapport:   care explained   empathic listening provided   questions encouraged   thoughts/feelings acknowledged   reassurance provided   questions answered   emotional support provided   choices provided  Taken 6/28/2025 1257 by Sabrina Frias LPN  Trust Relationship/Rapport:   care explained   emotional support provided   questions encouraged   reassurance provided   thoughts/feelings acknowledged   questions answered   empathic listening provided   choices provided  Taken 6/28/2025 0855 by Sabrina Frias LPN  Trust Relationship/Rapport:   care explained   emotional support provided   empathic listening provided   questions encouraged   reassurance provided   thoughts/feelings acknowledged   questions answered   choices provided  Goal: Readiness for Transition of Care  6/28/2025 1750 by Sabrina Frias LPN  Outcome:  Progressing  6/28/2025 1329 by Sabrina Frias LPN  Outcome: Progressing     Problem: Fall Injury Risk  Goal: Absence of Fall and Fall-Related Injury  6/28/2025 1750 by Sabrina Frias LPN  Outcome: Progressing  6/28/2025 1329 by Sabrina Frias LPN  Outcome: Progressing  Intervention: Identify and Manage Contributors  Recent Flowsheet Documentation  Taken 6/28/2025 1630 by Sabrina Frias LPN  Medication Review/Management: medications reviewed  Taken 6/28/2025 1408 by Sabrina Frias LPN  Medication Review/Management: medications reviewed  Taken 6/28/2025 1257 by Sabrina Frias LPN  Medication Review/Management: medications reviewed  Taken 6/28/2025 1037 by Sabrina Frias LPN  Medication Review/Management: medications reviewed  Taken 6/28/2025 0855 by Sabrina Frias LPN  Medication Review/Management: medications reviewed  Intervention: Promote Injury-Free Environment  Recent Flowsheet Documentation  Taken 6/28/2025 1630 by Sabrina Frias LPN  Safety Promotion/Fall Prevention:   safety round/check completed   room organization consistent   nonskid shoes/slippers when out of bed   mobility aid in reach   lighting adjusted   gait belt   fall prevention program maintained   clutter free environment maintained   assistive device/personal items within reach   activity supervised  Taken 6/28/2025 1408 by Sabrina Frias LPN  Safety Promotion/Fall Prevention:   safety round/check completed   room organization consistent   nonskid shoes/slippers when out of bed   mobility aid in reach   lighting adjusted   fall prevention program maintained   gait belt   clutter free environment maintained   assistive device/personal items within reach   activity supervised  Taken 6/28/2025 1257 by Sabrina Frias LPN  Safety Promotion/Fall Prevention:   safety round/check completed   room organization consistent   nonskid shoes/slippers when out of bed   mobility aid in reach   lighting adjusted   gait belt   fall prevention program maintained   clutter free  environment maintained   activity supervised   assistive device/personal items within reach  Taken 6/28/2025 1037 by Sabrina Frias LPN  Safety Promotion/Fall Prevention:   safety round/check completed   room organization consistent   nonskid shoes/slippers when out of bed   mobility aid in reach   lighting adjusted   fall prevention program maintained   gait belt   clutter free environment maintained   assistive device/personal items within reach   activity supervised  Taken 6/28/2025 0855 by Sabrina Frias LPN  Safety Promotion/Fall Prevention:   safety round/check completed   room organization consistent   nonskid shoes/slippers when out of bed   lighting adjusted   mobility aid in reach   gait belt   fall prevention program maintained   clutter free environment maintained   assistive device/personal items within reach   activity supervised     Problem: Wound  Goal: Optimal Coping  6/28/2025 1750 by Sabrina Frias LPN  Outcome: Progressing  6/28/2025 1329 by Sabrina Frias LPN  Outcome: Progressing  Intervention: Support Patient and Family Response  Recent Flowsheet Documentation  Taken 6/28/2025 1630 by Sabrina Frias LPN  Family/Support System Care:   support provided   self-care encouraged  Taken 6/28/2025 1257 by Sabrina Frias LPN  Family/Support System Care:   support provided   self-care encouraged  Taken 6/28/2025 0855 by Sabrina Frias LPN  Family/Support System Care:   support provided   self-care encouraged  Goal: Optimal Functional Ability  6/28/2025 1750 by Sabrina Frias LPN  Outcome: Progressing  6/28/2025 1329 by Sabrina Frias LPN  Outcome: Progressing  Intervention: Optimize Functional Ability  Recent Flowsheet Documentation  Taken 6/28/2025 1630 by Sabrina Frias LPN  Activity Management: activity encouraged  Activity Assistance Provided: assistance, stand-by  Taken 6/28/2025 1257 by Sabrina Frias LPN  Activity Management: activity encouraged  Activity Assistance Provided: assistance, stand-by  Taken 6/28/2025 0855  by Ali, Sabrina, LPN  Activity Management: activity encouraged  Activity Assistance Provided: assistance, stand-by  Goal: Absence of Infection Signs and Symptoms  6/28/2025 1750 by Sabrina Frias LPN  Outcome: Progressing  6/28/2025 1329 by Sabrina Frias LPN  Outcome: Progressing  Goal: Improved Oral Intake  6/28/2025 1750 by Sabrina Frias LPN  Outcome: Progressing  6/28/2025 1329 by Sabrina Frias LPN  Outcome: Progressing  Goal: Optimal Pain Control and Function  6/28/2025 1750 by Sabrina Frias LPN  Outcome: Progressing  6/28/2025 1329 by Sabrina Frias LPN  Outcome: Progressing  Goal: Skin Health and Integrity  6/28/2025 1750 by Sabrina Frias LPN  Outcome: Progressing  6/28/2025 1329 by Sabrina Frias LPN  Outcome: Progressing  Intervention: Optimize Skin Protection  Recent Flowsheet Documentation  Taken 6/28/2025 1630 by Sabrina Frias LPN  Activity Management: activity encouraged  Pressure Reduction Techniques: frequent weight shift encouraged  Head of Bed (HOB) Positioning: [facility] elevated  Pressure Reduction Devices: pressure-redistributing mattress utilized  Taken 6/28/2025 1257 by Sabrina Frias LPN  Activity Management: activity encouraged  Head of Bed (HOB) Positioning: HOB elevated  Taken 6/28/2025 0855 by Sabrina Frias LPN  Activity Management: activity encouraged  Pressure Reduction Techniques: frequent weight shift encouraged  Head of Bed (HOB) Positioning: HOB elevated  Pressure Reduction Devices: pressure-redistributing mattress utilized  Goal: Optimal Wound Healing  6/28/2025 1750 by Sabrina Frias LPN  Outcome: Progressing  6/28/2025 1329 by Sabrina Frias LPN  Outcome: Progressing

## 2025-06-28 NOTE — PLAN OF CARE
Goal Outcome Evaluation:  Plan of Care Reviewed With: patient        Progress: improving  Outcome Evaluation: Pt cleared for D/C by podietry, plan for PO Doxy at D/C. Call light within reach, care continues.  Paint wounds daily with betadine, leave open to air, wear open toed shoes, follow up with podietry in 1 week.

## 2025-06-28 NOTE — PLAN OF CARE
Goal Outcome Evaluation:                                            Problem: Adult Inpatient Plan of Care  Goal: Plan of Care Review  Outcome: Progressing  Goal: Patient-Specific Goal (Individualized)  Outcome: Progressing  Goal: Absence of Hospital-Acquired Illness or Injury  Outcome: Progressing  Intervention: Identify and Manage Fall Risk  Recent Flowsheet Documentation  Taken 6/28/2025 1257 by Sabrina Frias LPN  Safety Promotion/Fall Prevention:   safety round/check completed   room organization consistent   nonskid shoes/slippers when out of bed   mobility aid in reach   lighting adjusted   gait belt   fall prevention program maintained   clutter free environment maintained   activity supervised   assistive device/personal items within reach  Taken 6/28/2025 1037 by Sabrina Frias LPN  Safety Promotion/Fall Prevention:   safety round/check completed   room organization consistent   nonskid shoes/slippers when out of bed   mobility aid in reach   lighting adjusted   fall prevention program maintained   gait belt   clutter free environment maintained   assistive device/personal items within reach   activity supervised  Taken 6/28/2025 0855 by Sabrina Frias LPN  Safety Promotion/Fall Prevention:   safety round/check completed   room organization consistent   nonskid shoes/slippers when out of bed   lighting adjusted   mobility aid in reach   gait belt   fall prevention program maintained   clutter free environment maintained   assistive device/personal items within reach   activity supervised  Intervention: Prevent Skin Injury  Recent Flowsheet Documentation  Taken 6/28/2025 1257 by Sabrina Frias LPN  Body Position: position changed independently  Taken 6/28/2025 0855 by Sabrina Frias LPN  Body Position: position changed independently  Intervention: Prevent and Manage VTE (Venous Thromboembolism) Risk  Recent Flowsheet Documentation  Taken 6/28/2025 1257 by Sabrina Frias LPN  VTE Prevention/Management:   bilateral   SCDs  (sequential compression devices) off   patient refused intervention  Taken 6/28/2025 0855 by Sabrina Frias LPN  VTE Prevention/Management:   bilateral   SCDs (sequential compression devices) off  Intervention: Prevent Infection  Recent Flowsheet Documentation  Taken 6/28/2025 1257 by Sabrina Frias LPN  Infection Prevention:   single patient room provided   rest/sleep promoted   personal protective equipment utilized   cohorting utilized   environmental surveillance performed   equipment surfaces disinfected   hand hygiene promoted  Taken 6/28/2025 1037 by Sabrina Frias LPN  Infection Prevention:   single patient room provided   rest/sleep promoted   equipment surfaces disinfected   personal protective equipment utilized   hand hygiene promoted   cohorting utilized   environmental surveillance performed  Taken 6/28/2025 0855 by Sabrina Frias LPN  Infection Prevention:   single patient room provided   rest/sleep promoted   personal protective equipment utilized   hand hygiene promoted   equipment surfaces disinfected   environmental surveillance performed   cohorting utilized  Goal: Optimal Comfort and Wellbeing  Outcome: Progressing  Intervention: Provide Person-Centered Care  Recent Flowsheet Documentation  Taken 6/28/2025 1257 by Sabrina Frias LPN  Trust Relationship/Rapport:   care explained   emotional support provided   questions encouraged   reassurance provided   thoughts/feelings acknowledged   questions answered   empathic listening provided   choices provided  Taken 6/28/2025 0855 by Sabrina Frias LPN  Trust Relationship/Rapport:   care explained   emotional support provided   empathic listening provided   questions encouraged   reassurance provided   thoughts/feelings acknowledged   questions answered   choices provided  Goal: Readiness for Transition of Care  Outcome: Progressing     Problem: Fall Injury Risk  Goal: Absence of Fall and Fall-Related Injury  Outcome: Progressing  Intervention: Identify and Manage  Contributors  Recent Flowsheet Documentation  Taken 6/28/2025 1257 by Sabrina Frias LPN  Medication Review/Management: medications reviewed  Taken 6/28/2025 1037 by Sabrina Frias LPN  Medication Review/Management: medications reviewed  Taken 6/28/2025 0855 by Sabrina Frias LPN  Medication Review/Management: medications reviewed  Intervention: Promote Injury-Free Environment  Recent Flowsheet Documentation  Taken 6/28/2025 1257 by Sabrina Frias LPN  Safety Promotion/Fall Prevention:   safety round/check completed   room organization consistent   nonskid shoes/slippers when out of bed   mobility aid in reach   lighting adjusted   gait belt   fall prevention program maintained   clutter free environment maintained   activity supervised   assistive device/personal items within reach  Taken 6/28/2025 1037 by Sabrina Frias LPN  Safety Promotion/Fall Prevention:   safety round/check completed   room organization consistent   nonskid shoes/slippers when out of bed   mobility aid in reach   lighting adjusted   fall prevention program maintained   gait belt   clutter free environment maintained   assistive device/personal items within reach   activity supervised  Taken 6/28/2025 0855 by Sabrina Frias LPN  Safety Promotion/Fall Prevention:   safety round/check completed   room organization consistent   nonskid shoes/slippers when out of bed   lighting adjusted   mobility aid in reach   gait belt   fall prevention program maintained   clutter free environment maintained   assistive device/personal items within reach   activity supervised     Problem: Wound  Goal: Optimal Coping  Outcome: Progressing  Intervention: Support Patient and Family Response  Recent Flowsheet Documentation  Taken 6/28/2025 1257 by Sabrina Frias LPN  Family/Support System Care:   support provided   self-care encouraged  Taken 6/28/2025 0855 by Sabrina Frias LPN  Family/Support System Care:   support provided   self-care encouraged  Goal: Optimal Functional  Ability  Outcome: Progressing  Intervention: Optimize Functional Ability  Recent Flowsheet Documentation  Taken 6/28/2025 1257 by Sabrina Frias LPN  Activity Management: activity encouraged  Activity Assistance Provided: assistance, stand-by  Taken 6/28/2025 0855 by Sabrina Frias LPN  Activity Management: activity encouraged  Activity Assistance Provided: assistance, stand-by  Goal: Absence of Infection Signs and Symptoms  Outcome: Progressing  Goal: Improved Oral Intake  Outcome: Progressing  Goal: Optimal Pain Control and Function  Outcome: Progressing  Goal: Skin Health and Integrity  Outcome: Progressing  Intervention: Optimize Skin Protection  Recent Flowsheet Documentation  Taken 6/28/2025 1257 by Sabrina Frias LPN  Activity Management: activity encouraged  Head of Bed (HOB) Positioning: HOB elevated  Taken 6/28/2025 0855 by Sabrina Frias LPN  Activity Management: activity encouraged  Pressure Reduction Techniques: frequent weight shift encouraged  Head of Bed (HOB) Positioning: HOB elevated  Pressure Reduction Devices: pressure-redistributing mattress utilized  Goal: Optimal Wound Healing  Outcome: Progressing

## 2025-06-29 ENCOUNTER — READMISSION MANAGEMENT (OUTPATIENT)
Dept: CALL CENTER | Facility: HOSPITAL | Age: 85
End: 2025-06-29
Payer: MEDICARE

## 2025-06-29 VITALS
WEIGHT: 235.45 LBS | HEART RATE: 71 BPM | DIASTOLIC BLOOD PRESSURE: 71 MMHG | SYSTOLIC BLOOD PRESSURE: 126 MMHG | HEIGHT: 76 IN | BODY MASS INDEX: 28.67 KG/M2 | RESPIRATION RATE: 14 BRPM | OXYGEN SATURATION: 94 % | TEMPERATURE: 97.9 F

## 2025-06-29 LAB
ANION GAP SERPL CALCULATED.3IONS-SCNC: 12.9 MMOL/L (ref 5–15)
BUN SERPL-MCNC: 20.5 MG/DL (ref 8–23)
BUN/CREAT SERPL: 16.9 (ref 7–25)
CALCIUM SPEC-SCNC: 8.9 MG/DL (ref 8.6–10.5)
CHLORIDE SERPL-SCNC: 104 MMOL/L (ref 98–107)
CO2 SERPL-SCNC: 19.1 MMOL/L (ref 22–29)
CREAT SERPL-MCNC: 1.21 MG/DL (ref 0.76–1.27)
EGFRCR SERPLBLD CKD-EPI 2021: 59.8 ML/MIN/1.73
GLUCOSE BLDC GLUCOMTR-MCNC: 110 MG/DL (ref 70–105)
GLUCOSE BLDC GLUCOMTR-MCNC: 140 MG/DL (ref 70–105)
GLUCOSE BLDC GLUCOMTR-MCNC: 218 MG/DL (ref 70–105)
GLUCOSE SERPL-MCNC: 129 MG/DL (ref 65–99)
POTASSIUM SERPL-SCNC: 4.1 MMOL/L (ref 3.5–5.2)
SODIUM SERPL-SCNC: 136 MMOL/L (ref 136–145)

## 2025-06-29 PROCEDURE — 82948 REAGENT STRIP/BLOOD GLUCOSE: CPT

## 2025-06-29 PROCEDURE — 25010000002 CEFEPIME PER 500 MG: Performed by: INTERNAL MEDICINE

## 2025-06-29 PROCEDURE — 25010000002 VANCOMYCIN 1.5-0.9 GM/500ML-% SOLUTION: Performed by: INTERNAL MEDICINE

## 2025-06-29 PROCEDURE — 25010000002 HEPARIN (PORCINE) PER 1000 UNITS

## 2025-06-29 PROCEDURE — 63710000001 INSULIN LISPRO (HUMAN) PER 5 UNITS

## 2025-06-29 RX ORDER — VANCOMYCIN/0.9 % SOD CHLORIDE 1.5G/250ML
1500 PLASTIC BAG, INJECTION (ML) INTRAVENOUS EVERY 24 HOURS
Status: DISCONTINUED | OUTPATIENT
Start: 2025-06-29 | End: 2025-06-29 | Stop reason: HOSPADM

## 2025-06-29 RX ADMIN — INSULIN LISPRO 3 UNITS: 100 INJECTION, SOLUTION INTRAVENOUS; SUBCUTANEOUS at 10:54

## 2025-06-29 RX ADMIN — LOSARTAN POTASSIUM 25 MG: 25 TABLET, FILM COATED ORAL at 08:13

## 2025-06-29 RX ADMIN — CEFEPIME 2000 MG: 2 INJECTION, POWDER, FOR SOLUTION INTRAVENOUS at 08:12

## 2025-06-29 RX ADMIN — Medication 1 TABLET: at 08:12

## 2025-06-29 RX ADMIN — Medication 2500 MCG: at 08:12

## 2025-06-29 RX ADMIN — GABAPENTIN 600 MG: 300 CAPSULE ORAL at 08:12

## 2025-06-29 RX ADMIN — HEPARIN SODIUM 5000 UNITS: 5000 INJECTION INTRAVENOUS; SUBCUTANEOUS at 05:22

## 2025-06-29 RX ADMIN — HEPARIN SODIUM 5000 UNITS: 5000 INJECTION INTRAVENOUS; SUBCUTANEOUS at 12:36

## 2025-06-29 RX ADMIN — CLOPIDOGREL BISULFATE 75 MG: 75 TABLET, FILM COATED ORAL at 08:12

## 2025-06-29 RX ADMIN — Medication 10 ML: at 08:13

## 2025-06-29 RX ADMIN — Medication 1500 MG: at 12:36

## 2025-06-29 NOTE — PROGRESS NOTES
"Pharmacy Antimicrobial Dosing Service  Subjective:  Wilfredo Mcnamara is a 84 y.o.male admitted with DFI. Pharmacy has been consulted to dose Vancomycin and Cefepime for DFI/SSTI.    PMH: T2DM, Peripherial Neuropathy, AMI, CKD-III, HLD, HTN  Assessment/Plan    1. Day #3 Vancomycin: Goal -600 mcg*h/mL. Last dose of vancomycin was 1250 mg 6/28 at 1020. Vancomycin random 6/28 at 2227= 13.4 mcg/ml. Renal function has improved. Will initiate vancomycin 1500 mg every 24 hours (14 mg/kg actual BW) for predicted AUC= 468 mcg*h/ml. Trough ordered for 7/1 at 1100 prior to third dose of new regimen.     2. Day #3 Cefepime: 2g IV q8h for estCrCl > 60 mL/min.    Will continue to monitor drug levels, renal function, culture and sensitivities, and patient clinical status.       Objective:  Relevant clinical data and objective history reviewed:  193 cm (75.98\")   107 kg (235 lb 7.2 oz)   Ideal body weight: 86.8 kg (191 lb 4.5 oz)  Adjusted ideal body weight: 94.8 kg (208 lb 15.2 oz)  Body mass index is 28.67 kg/m².  Results from last 7 days   Lab Units 06/28/25  2227   VANCOMYCIN RM mcg/mL 13.40     Results from last 7 days   Lab Units 06/28/25  2227 06/27/25  0052 06/26/25  1827   CREATININE mg/dL 1.21 1.68* 1.66*     Estimated Creatinine Clearance: 63.2 mL/min (by C-G formula based on SCr of 1.21 mg/dL).  I/O last 3 completed shifts:  In: 240 [P.O.:240]  Out: -     Results from last 7 days   Lab Units 06/28/25  0559 06/27/25  0052 06/26/25  1827   WBC 10*3/mm3 5.86 5.31 5.51     Temperature    06/28/25 1936 06/28/25 2319 06/29/25 0357   Temp: 97.6 °F (36.4 °C) 97.4 °F (36.3 °C) 97.9 °F (36.6 °C)     Baseline culture/source/susceptibility:  Microbiology Results (last 10 days)       Procedure Component Value - Date/Time    Wound Culture - Wound, Foot, Right [[id number]]  (Abnormal) Collected: 06/27/25 1305    Lab Status: Preliminary result Specimen: Wound from Foot, Right Updated: 06/29/25 0959     Wound Culture Scant growth (1+) " Staphylococcus, coagulase negative     Gram Stain Rare (1+) WBCs per low power field      No organisms seen    Blood Culture - Blood, Hand, Left [[id number]]  (Normal) Collected: 06/27/25 0052    Lab Status: Preliminary result Specimen: Blood from Hand, Left Updated: 06/29/25 0115     Blood Culture No growth at 2 days    Blood Culture - Blood, Hand, Right [[id number]]  (Normal) Collected: 06/27/25 0046    Lab Status: Preliminary result Specimen: Blood from Hand, Right Updated: 06/29/25 0115     Blood Culture No growth at 2 days    COVID-19 and FLU A/B PCR, 1 HR TAT - Swab, Nasopharynx [[id number]]  (Normal) Collected: 06/26/25 2019    Lab Status: Final result Specimen: Swab from Nasopharynx Updated: 06/26/25 2039     COVID19 Not Detected     Influenza A PCR Not Detected     Influenza B PCR Not Detected    Narrative:      Fact sheet for providers: https://www.fda.gov/media/871949/download    Fact sheet for patients: https://www.fda.gov/media/843478/download    Test performed by PCR.    RSV PCR - Swab, Nasopharynx [[id number]]  (Normal) Collected: 06/26/25 2019    Lab Status: Final result Specimen: Swab from Nasopharynx Updated: 06/26/25 2039     RSV, PCR Not Detected          Natacha Gray, PharmD  06/29/25 10:43 EDT

## 2025-06-29 NOTE — PLAN OF CARE
Goal Outcome Evaluation:  Patient is resting well on room air.  No major complaints this shift.  Call light within reach.           Progress: no change

## 2025-06-29 NOTE — DISCHARGE SUMMARY
[facility] [address] Medicine Services  Discharge Summary    Date of Service: 2025  Patient Name: Wilfredo Mcnamara  : 1940  MRN: [id number]    Date of Admission: 2025  Discharge Diagnosis: Diabetic foot ulcer associated with type 2 diabetes mellitus  CKD stage III  Hypertension      Date of Discharge: 2025  Primary Care Physician: Nigel Tinoco MD      Hospital Course       Hospital Course:    This patient is an 84-year-old gentleman who was admitted and treated for right diabetic foot ulcers.  He was started on IV antibiotics vancomycin and cefepime.  Right foot x-ray showed prominent soft tissue edema centered at the fifth MTP joint likely corresponding to reported infection with no evidence of acute osteomyelitis.  An MRI of the foot was done to confirm which also ruled out osteomyelitis.  He was seen by podiatry.  No surgical intervention was recommended.  Podiatry recommended discharging the patient with daily Betadine dressings and oral antibiotics.  Patient is being discharged home on doxycycline and will follow-up with podiatry in the outpatient setting.  Of note blood cultures done upon presentation have remained negative.  Wound culture grew coagulase-negative staph with scant growth.        DISCHARGE Follow Up Recommendations:-Follow-up PCP in 1 week, follow-up podiatry in 1 week      Day of Discharge     Vital Signs:  Temp:  [97.4 °F (36.3 °C)-97.9 °F (36.6 °C)] 97.9 °F (36.6 °C)  Heart Rate:  [66-72] 71  Resp:  [14-19] 14  BP: (126-160)/(62-89) 126/71    Physical Exam:  Physical Exam  Constitutional:       Appearance: Normal appearance.   HENT:      Head: Normocephalic and atraumatic.      Nose: Nose normal.      Mouth/Throat:      Mouth: Mucous membranes are moist.   Eyes:      Extraocular Movements: Extraocular movements intact.      Conjunctiva/sclera: Conjunctivae normal.      Pupils: Pupils are equal, round, and reactive to light.   Cardiovascular:      Rate and  Rhythm: Normal rate and regular rhythm.      Pulses: Normal pulses.      Heart sounds: Normal heart sounds.   Pulmonary:      Effort: Pulmonary effort is normal.      Breath sounds: Normal breath sounds.   Abdominal:      General: Abdomen is flat. Bowel sounds are normal.      Palpations: Abdomen is soft.   Musculoskeletal:         General: Normal range of motion.      Cervical back: Normal range of motion and neck supple.   Skin:     General: Skin is warm and dry.      Capillary Refill: Capillary refill takes less than 2 seconds.   Neurological:      General: No focal deficit present.      Mental Status: He is alert and oriented to person, place, and time.   Psychiatric:         Mood and Affect: Mood normal.         Behavior: Behavior normal.         Thought Content: Thought content normal.         Judgment: Judgment normal.            Pertinent  and/or Most Recent Results     LAB RESULTS:      Lab 06/28/25  0559 06/27/25 0052 06/26/25 2045 06/26/25  1827   WBC 5.86 5.31  --  5.51   HEMOGLOBIN 12.5* 12.2*  --  12.3*   HEMATOCRIT 37.9 37.9  --  37.6   PLATELETS 167 170  --  167   NEUTROS ABS  --  3.03  --  3.52   IMMATURE GRANS (ABS)  --  0.02  --  0.01   LYMPHS ABS  --  1.25  --  1.12   MONOS ABS  --  0.83  --  0.74   EOS ABS  --  0.16  --  0.09   MCV 93.3 95.0  --  95.7   SED RATE  --   --   --  19   CRP  --   --   --  0.48   LACTATE  --   --  1.4 2.1*         Lab 06/28/25  2227 06/27/25 0052 06/26/25  1827   SODIUM 136 138 133*   POTASSIUM 4.1 4.4 4.3   CHLORIDE 104 103 101   CO2 19.1* 22.4 23.1   ANION GAP 12.9 12.6 8.9   BUN 20.5 29.9* 30.2*   CREATININE 1.21 1.68* 1.66*   EGFR 59.8* 40.3* 40.9*   GLUCOSE 129* 192* 265*   CALCIUM 8.9 8.9 9.7   MAGNESIUM  --  1.7  --    PHOSPHORUS  --  3.2  --          Lab 06/27/25 0052 06/26/25  1827   TOTAL PROTEIN 6.4 6.3   ALBUMIN 3.8 4.0   GLOBULIN 2.6 2.3   ALT (SGPT) 19 15   AST (SGOT) 24 17   BILIRUBIN 0.6 0.7   ALK PHOS 93 101                     Brief Urine Lab  Results       None          Microbiology Results (last 10 days)       Procedure Component Value - Date/Time    Wound Culture - Wound, Foot, Right [[id number]]  (Abnormal) Collected: 06/27/25 1305    Lab Status: Preliminary result Specimen: Wound from Foot, Right Updated: 06/29/25 0959     Wound Culture Scant growth (1+) Staphylococcus, coagulase negative     Gram Stain Rare (1+) WBCs per low power field      No organisms seen    Blood Culture - Blood, Hand, Left [[id number]]  (Normal) Collected: 06/27/25 0052    Lab Status: Preliminary result Specimen: Blood from Hand, Left Updated: 06/29/25 0115     Blood Culture No growth at 2 days    Blood Culture - Blood, Hand, Right [[id number]]  (Normal) Collected: 06/27/25 0046    Lab Status: Preliminary result Specimen: Blood from Hand, Right Updated: 06/29/25 0115     Blood Culture No growth at 2 days    COVID-19 and FLU A/B PCR, 1 HR TAT - Swab, Nasopharynx [[id number]]  (Normal) Collected: 06/26/25 2019    Lab Status: Final result Specimen: Swab from Nasopharynx Updated: 06/26/25 2039     COVID19 Not Detected     Influenza A PCR Not Detected     Influenza B PCR Not Detected    Narrative:      Fact sheet for providers: https://www.fda.gov/media/645270/download    Fact sheet for patients: https://www.fda.gov/media/883057/download    Test performed by PCR.    RSV PCR - Swab, Nasopharynx [[id number]]  (Normal) Collected: 06/26/25 2019    Lab Status: Final result Specimen: Swab from Nasopharynx Updated: 06/26/25 2039     RSV, PCR Not Detected            MRI Foot Right Without Contrast  Result Date: 6/27/2025  Impression: Impression: No evidence of osteomyelitis or soft tissue abscess. Soft tissue edema throughout the visualized foot. Finding could represent cellulitis and/or dependent edema. Correlate clinically. Electronically Signed: Miles Arguello MD  6/27/2025 4:52 PM EDT  Workstation ID: [id number]    XR Foot 3+ View Right  Result Date: 6/26/2025  Impression: Impression:  Prominent soft tissue edema centered at the fifth MTP joint, likely corresponding to reported infection. No radiographic evidence of acute osteomyelitis. If there is persistent clinical concern for osteomyelitis, MRI is more sensitive. Electronically Signed: Frank Wood MD  6/26/2025 7:46 PM EDT  Workstation ID: [id number]              Results for orders placed during the hospital encounter of 12/07/22    Adult Transthoracic Echo Complete W/ Cont if Necessary Per Protocol    Interpretation Summary    Left ventricular ejection fraction appears to be 51 - 55%.    Left ventricular diastolic function is consistent with (grade I) impaired relaxation.    Mild dilation of the aortic root is present.  4.3 cm    No significant valvular abnormalities      Labs Pending at Discharge:  Pending Results       None            Procedures Performed           Consults:   Consults       Date and Time Order Name Status Description    6/27/2025  1:24 PM Inpatient Podiatry Consult Completed     6/27/2025 10:10 AM Inpatient Podiatry Consult                Discharge Details        Discharge Medications        New Medications        Instructions Start Date   doxycycline 100 MG capsule  Commonly known as: VIBRAMYCIN   100 mg, Oral, 2 Times Daily             Continue These Medications        Instructions Start Date   atorvastatin 40 MG tablet  Commonly known as: LIPITOR   40 mg, Oral, Nightly      clopidogrel 75 MG tablet  Commonly known as: PLAVIX   75 mg, Oral, Daily      fexofenadine 180 MG tablet  Commonly known as: ALLEGRA   180 mg, Daily      FREESTYLE LITE test strip  Generic drug: glucose blood   1 each, Daily      gabapentin 300 MG capsule  Commonly known as: NEURONTIN   600 mg, 2 Times Daily      glimepiride 2 MG tablet  Commonly known as: AMARYL   2 mg, Daily      losartan 25 MG tablet  Commonly known as: COZAAR   1 tablet, Daily      metFORMIN 500 MG tablet  Commonly known as: GLUCOPHAGE   1,000 mg, 2 Times Daily With Meals       multivitamin with minerals tablet tablet   1 tablet, Daily      nitroglycerin 0.4 MG SL tablet  Commonly known as: NITROSTAT   0.4 mg, Sublingual, Every 5 Minutes PRN, Take no more than 3 doses in 15 minutes.      NON FORMULARY   1 tablet, Daily      vitamin B-12 1000 MCG tablet  Commonly known as: CYANOCOBALAMIN   2,500 mcg, Daily               No Known Allergies      Discharge Disposition: Home or Self Care    Diet:  Hospital:  Diet Order   Procedures    Diet: Cardiac, Diabetic; Healthy Heart (2-3 Na+); Consistent Carbohydrate; Fluid Consistency: Thin (IDDSI 0)         Discharge Activity:   Activity Instructions       Activity as Tolerated                CODE STATUS:  Code Status and Medical Interventions: CPR (Attempt to Resuscitate); Full Support   Ordered at: 06/26/25 8157     Code Status (Patient has no pulse and is not breathing):    CPR (Attempt to Resuscitate)     Medical Interventions (Patient has pulse or is breathing):    Full Support         Future Appointments   Date Time Provider Department Center   8/15/2025  8:45 AM Haris Estrada MD [facility]   10/27/2025 11:00 AM Sidney Kelly MD [facility]       Additional Instructions for the Follow-ups that You Need to Schedule       Discharge Follow-up with PCP   As directed       Currently Documented PCP:    Nigel Tinoco MD    PCP Phone Number:    [phone]     Follow Up Details: 1 week        Discharge Follow-up with Specialty: Podiatry: Dr. Francois.  Call [phone].; 1 Week   As directed      Specialty: Podiatry: Dr. Francois.  Call [phone].   Follow Up: 1 Week        Discharge Follow-up with Specified Provider: Podiatry; 1 Week   As directed      To: Podiatry   Follow Up: 1 Week                Time spent on Discharge including face to face service:  <30 minutes    Signature: Electronically signed by Manju Brown MD, 06/29/25, 15:18 EDT.  [facility] Hospitalist Team

## 2025-06-29 NOTE — PLAN OF CARE
Goal Outcome Evaluation:                                            Problem: Adult Inpatient Plan of Care  Goal: Plan of Care Review  Outcome: Progressing  Flowsheets (Taken 6/28/2025 0335 by Kyleigh Silvestre, RN)  Outcome Evaluation: Pt cleared for D/C by henrik, plan for PO Doxy at D/C. Call light within reach, care continues.  Goal: Patient-Specific Goal (Individualized)  Outcome: Progressing  Goal: Absence of Hospital-Acquired Illness or Injury  Outcome: Progressing  Intervention: Identify and Manage Fall Risk  Recent Flowsheet Documentation  Taken 6/29/2025 1205 by Sabrina Frias LPN  Safety Promotion/Fall Prevention:   safety round/check completed   room organization consistent   nonskid shoes/slippers when out of bed   mobility aid in reach   lighting adjusted   gait belt   fall prevention program maintained   clutter free environment maintained   assistive device/personal items within reach   activity supervised  Taken 6/29/2025 1022 by Sabrina Frias LPN  Safety Promotion/Fall Prevention:   safety round/check completed   room organization consistent   nonskid shoes/slippers when out of bed   mobility aid in reach   lighting adjusted   gait belt   fall prevention program maintained   clutter free environment maintained   assistive device/personal items within reach   activity supervised  Taken 6/29/2025 0800 by Sabrina Frias LPN  Safety Promotion/Fall Prevention:   safety round/check completed   room organization consistent   nonskid shoes/slippers when out of bed   mobility aid in reach   gait belt   lighting adjusted   fall prevention program maintained   assistive device/personal items within reach   activity supervised   clutter free environment maintained  Intervention: Prevent Skin Injury  Recent Flowsheet Documentation  Taken 6/29/2025 1205 by Sabrina Frias LPN  Body Position:   position changed independently   weight shifting  Skin Protection: incontinence pads utilized  Taken 6/29/2025 0800 by Sabrina Frias  LPN  Body Position: weight shifting  Intervention: Prevent and Manage VTE (Venous Thromboembolism) Risk  Recent Flowsheet Documentation  Taken 6/29/2025 1205 by Sabrina Frias LPN  VTE Prevention/Management:   SCDs (sequential compression devices) off   patient refused intervention  Taken 6/29/2025 0800 by Sabrina Frias LPN  VTE Prevention/Management:   SCDs (sequential compression devices) off   patient refused intervention  Intervention: Prevent Infection  Recent Flowsheet Documentation  Taken 6/29/2025 1205 by Sabrina Frias LPN  Infection Prevention:   single patient room provided   personal protective equipment utilized   rest/sleep promoted   equipment surfaces disinfected   environmental surveillance performed   cohorting utilized   hand hygiene promoted  Taken 6/29/2025 1022 by Sabrina Frias LPN  Infection Prevention:   single patient room provided   rest/sleep promoted   personal protective equipment utilized   hand hygiene promoted   equipment surfaces disinfected   environmental surveillance performed   cohorting utilized  Taken 6/29/2025 0800 by Sabrina Frias LPN  Infection Prevention:   single patient room provided   rest/sleep promoted   personal protective equipment utilized   hand hygiene promoted   environmental surveillance performed   cohorting utilized   equipment surfaces disinfected  Goal: Optimal Comfort and Wellbeing  Outcome: Progressing  Intervention: Provide Person-Centered Care  Recent Flowsheet Documentation  Taken 6/29/2025 1205 by Sabrina Frias LPN  Trust Relationship/Rapport:   care explained   questions encouraged   reassurance provided   thoughts/feelings acknowledged   questions answered   empathic listening provided   emotional support provided   choices provided  Taken 6/29/2025 0800 by Sabrina Frias LPN  Trust Relationship/Rapport:   thoughts/feelings acknowledged   reassurance provided   questions answered   emotional support provided   choices provided   care explained   empathic  listening provided   questions encouraged  Goal: Readiness for Transition of Care  Outcome: Progressing     Problem: Fall Injury Risk  Goal: Absence of Fall and Fall-Related Injury  Outcome: Progressing  Intervention: Identify and Manage Contributors  Recent Flowsheet Documentation  Taken 6/29/2025 1205 by Sabrina Frias LPN  Medication Review/Management: medications reviewed  Taken 6/29/2025 1022 by Sabrina Frias LPN  Medication Review/Management: medications reviewed  Taken 6/29/2025 0800 by Sabrina Frias LPN  Medication Review/Management: medications reviewed  Intervention: Promote Injury-Free Environment  Recent Flowsheet Documentation  Taken 6/29/2025 1205 by Sabrina Frias LPN  Safety Promotion/Fall Prevention:   safety round/check completed   room organization consistent   nonskid shoes/slippers when out of bed   mobility aid in reach   lighting adjusted   gait belt   fall prevention program maintained   clutter free environment maintained   assistive device/personal items within reach   activity supervised  Taken 6/29/2025 1022 by Sabrina Frias LPN  Safety Promotion/Fall Prevention:   safety round/check completed   room organization consistent   nonskid shoes/slippers when out of bed   mobility aid in reach   lighting adjusted   gait belt   fall prevention program maintained   clutter free environment maintained   assistive device/personal items within reach   activity supervised  Taken 6/29/2025 0800 by Sabrina Frias LPN  Safety Promotion/Fall Prevention:   safety round/check completed   room organization consistent   nonskid shoes/slippers when out of bed   mobility aid in reach   gait belt   lighting adjusted   fall prevention program maintained   assistive device/personal items within reach   activity supervised   clutter free environment maintained     Problem: Wound  Goal: Optimal Coping  Outcome: Progressing  Intervention: Support Patient and Family Response  Recent Flowsheet Documentation  Taken 6/29/2025  1205 by Sabrina Frias LPN  Family/Support System Care:   support provided   self-care encouraged  Taken 6/29/2025 0800 by Sabrina Frias LPN  Family/Support System Care:   support provided   self-care encouraged  Goal: Optimal Functional Ability  Outcome: Progressing  Intervention: Optimize Functional Ability  Recent Flowsheet Documentation  Taken 6/29/2025 1205 by Sabrina Frias LPN  Activity Management: activity encouraged  Activity Assistance Provided: assistance, stand-by  Taken 6/29/2025 0800 by Sabrina Frias LPN  Activity Management: activity encouraged  Activity Assistance Provided: assistance, stand-by  Goal: Absence of Infection Signs and Symptoms  Outcome: Progressing  Goal: Improved Oral Intake  Outcome: Progressing  Goal: Optimal Pain Control and Function  Outcome: Progressing  Goal: Skin Health and Integrity  Outcome: Progressing  Intervention: Optimize Skin Protection  Recent Flowsheet Documentation  Taken 6/29/2025 1205 by Sabrina Frias LPN  Activity Management: activity encouraged  Pressure Reduction Techniques: frequent weight shift encouraged  Head of Bed (HOB) Positioning: HOB elevated  Pressure Reduction Devices: pressure-redistributing mattress utilized  Taken 6/29/2025 0800 by Sabrina Frias LPN  Activity Management: activity encouraged  Head of Bed (HOB) Positioning: HOB elevated  Goal: Optimal Wound Healing  Outcome: Progressing

## 2025-06-30 LAB
BACTERIA SPEC AEROBE CULT: ABNORMAL
GRAM STN SPEC: ABNORMAL
GRAM STN SPEC: ABNORMAL

## 2025-06-30 NOTE — CASE MANAGEMENT/SOCIAL WORK
Case Management Discharge Note      Final Note: Routine home         Selected Continued Care - Discharged on 6/29/2025 Admission date: 6/26/2025 - Discharge disposition: Home or Self Care              Transportation Services  Transportation: Private Transportation  Private: Car    Final Discharge Disposition Code: 01 - home or self-care

## 2025-06-30 NOTE — OUTREACH NOTE
Prep Survey      Flowsheet Row Responses   [facility] patient discharged from? [facility]   Is LACE score < 7 ? No   Eligibility Readm Mgmt   Discharge diagnosis Diabetic foot ulcer associated with type 2 diabetes mellitus   Does the patient have one of the following disease processes/diagnoses(primary or secondary)? Other   Does the patient have Home health ordered? Yes   What is the Home health agency?  [facility]   Is there a DME ordered? No   Prep survey completed? Yes            AXEL TELLES - Registered Nurse

## 2025-07-02 ENCOUNTER — READMISSION MANAGEMENT (OUTPATIENT)
Dept: CALL CENTER | Facility: HOSPITAL | Age: 85
End: 2025-07-02
Payer: MEDICARE

## 2025-07-02 LAB
BACTERIA SPEC AEROBE CULT: NORMAL
BACTERIA SPEC AEROBE CULT: NORMAL

## 2025-07-02 NOTE — OUTREACH NOTE
Medical Week 1 Survey      Flowsheet Row Responses   [facility] patient discharged from? [facility]   Does the patient have one of the following disease processes/diagnoses(primary or secondary)? Other   Week 1 attempt successful? No   Unsuccessful attempts Attempt 1            MARIANO DU - Registered Nurse

## 2025-07-07 ENCOUNTER — READMISSION MANAGEMENT (OUTPATIENT)
Dept: CALL CENTER | Facility: HOSPITAL | Age: 85
End: 2025-07-07
Payer: MEDICARE

## 2025-07-07 NOTE — OUTREACH NOTE
Medical Week 1 Survey      Flowsheet Row Responses   [facility] patient discharged from? [facility]   Does the patient have one of the following disease processes/diagnoses(primary or secondary)? Other   Week 1 attempt successful? Yes   Call start time 1316   Call end time 1319   Discharge diagnosis Diabetic foot ulcer associated with type 2 diabetes mellitus   Person spoke with today (if not patient) and relationship spouse   Meds reviewed with patient/caregiver? Yes   Is the patient having any side effects they believe may be caused by any medication additions or changes? No   Does the patient have all medications ordered at discharge? Yes   Prescription comments patient is taking doxycycline   Is the patient taking all medications as directed (includes completed medication regime)? Yes   Comments regarding appointments Pt has appt with Dr. Miller podiatrist   Does the patient have a primary care provider?  Yes   Does the patient have an appointment with their PCP within 7 days of discharge? No   What is preventing the patient from scheduling follow up appointments within 7 days of discharge? --  [spouse they are seeing podiatry]   Has the patient kept scheduled appointments due by today? N/A   Psychosocial issues? No   Did the patient receive a copy of their discharge instructions? Yes   Nursing interventions Reviewed instructions with patient   What is the patient's perception of their health status since discharge? Improving   If the patient is a current smoker, are they able to teach back resources for cessation? Not a smoker   Week 1 call completed? Yes   Graduated Yes   Would this patient benefit from a Referral to Amb Social Work? No   Is the patient interested in additional calls from an ambulatory ? No   Wrap up additional comments Spouse states patient is doing very well.  Denies needs.   Call end time 1319            JAY WU - Registered Nurse

## 2025-08-18 PROBLEM — I21.4 NSTEMI (NON-ST ELEVATED MYOCARDIAL INFARCTION): Status: RESOLVED | Noted: 2022-12-07 | Resolved: 2025-08-18

## 2025-08-18 PROBLEM — I25.10 CORONARY ARTERY DISEASE INVOLVING NATIVE CORONARY ARTERY OF NATIVE HEART WITHOUT ANGINA PECTORIS: Chronic | Status: ACTIVE | Noted: 2022-12-07

## 2025-08-18 PROBLEM — I71.21 ANEURYSM OF ASCENDING AORTA WITHOUT RUPTURE: Chronic | Status: ACTIVE | Noted: 2023-10-26

## 2025-08-18 PROBLEM — L03.90 CELLULITIS, UNSPECIFIED CELLULITIS SITE: Status: RESOLVED | Noted: 2023-05-14 | Resolved: 2025-08-18

## 2025-08-25 ENCOUNTER — OFFICE VISIT (OUTPATIENT)
Dept: CARDIOLOGY | Facility: CLINIC | Age: 85
End: 2025-08-25
Payer: MEDICARE

## 2025-08-25 VITALS
OXYGEN SATURATION: 96 % | HEIGHT: 76 IN | WEIGHT: 232.8 LBS | SYSTOLIC BLOOD PRESSURE: 121 MMHG | BODY MASS INDEX: 28.35 KG/M2 | HEART RATE: 70 BPM | DIASTOLIC BLOOD PRESSURE: 67 MMHG

## 2025-08-25 DIAGNOSIS — I10 ESSENTIAL HYPERTENSION: Chronic | ICD-10-CM

## 2025-08-25 DIAGNOSIS — E66.3 OVERWEIGHT (BMI 25.0-29.9): ICD-10-CM

## 2025-08-25 DIAGNOSIS — I71.21 ANEURYSM OF ASCENDING AORTA WITHOUT RUPTURE: Chronic | ICD-10-CM

## 2025-08-25 DIAGNOSIS — E78.2 MIXED HYPERLIPIDEMIA: Chronic | ICD-10-CM

## 2025-08-25 DIAGNOSIS — E11.40 TYPE 2 DIABETES MELLITUS WITH DIABETIC NEUROPATHY, WITHOUT LONG-TERM CURRENT USE OF INSULIN: Chronic | ICD-10-CM

## 2025-08-25 DIAGNOSIS — G47.33 OBSTRUCTIVE SLEEP APNEA SYNDROME: Chronic | ICD-10-CM

## 2025-08-25 DIAGNOSIS — I25.10 CORONARY ARTERY DISEASE INVOLVING NATIVE CORONARY ARTERY OF NATIVE HEART WITHOUT ANGINA PECTORIS: Primary | Chronic | ICD-10-CM

## 2025-08-25 RX ORDER — ASPIRIN 81 MG/1
81 TABLET, CHEWABLE ORAL DAILY
COMMUNITY

## 2025-09-03 ENCOUNTER — OFFICE (AMBULATORY)
Dept: URBAN - METROPOLITAN AREA CLINIC 64 | Facility: CLINIC | Age: 85
End: 2025-09-03
Payer: MEDICARE

## 2025-09-03 VITALS
WEIGHT: 236 LBS | SYSTOLIC BLOOD PRESSURE: 147 MMHG | DIASTOLIC BLOOD PRESSURE: 84 MMHG | HEIGHT: 76 IN | HEART RATE: 80 BPM

## 2025-09-03 DIAGNOSIS — K52.89 OTHER SPECIFIED NONINFECTIVE GASTROENTERITIS AND COLITIS: ICD-10-CM

## 2025-09-03 DIAGNOSIS — Z79.899 OTHER LONG TERM (CURRENT) DRUG THERAPY: ICD-10-CM

## 2025-09-03 DIAGNOSIS — R13.10 DYSPHAGIA, UNSPECIFIED: ICD-10-CM

## 2025-09-03 DIAGNOSIS — K59.00 CONSTIPATION, UNSPECIFIED: ICD-10-CM

## 2025-09-03 PROCEDURE — 99204 OFFICE O/P NEW MOD 45 MIN: CPT | Performed by: STUDENT IN AN ORGANIZED HEALTH CARE EDUCATION/TRAINING PROGRAM

## (undated) DEVICE — CATH DIAG IMPULSE FL4 6F 100CM

## (undated) DEVICE — TBG NAMIC PRESS MONTR A/ F/M 12IN

## (undated) DEVICE — STPCK 3/WY HP M/RA W/OFF/HNDL 1050PSI STRL

## (undated) DEVICE — GLIDESHEATH SLENDER ACCESS KIT: Brand: GLIDESHEATH SLENDER

## (undated) DEVICE — STPCK 3WY HP ROT

## (undated) DEVICE — CATH DIAG IMPULSE FR4 6F 100CM

## (undated) DEVICE — Device

## (undated) DEVICE — NC TREK™ CORONARY DILATATION CATHETER 4.5 MM X 15 MM / RAPID-EXCHANGE: Brand: NC TREK™

## (undated) DEVICE — DEV INFL COMPAK W/ACCESSPLUS IN4530

## (undated) DEVICE — GW EMR FIX EXCHG J STD .035 3MM 260CM

## (undated) DEVICE — MEDICINE CUP, GRADUATED, STER: Brand: MEDLINE

## (undated) DEVICE — TR BAND RADIAL ARTERY COMPRESSION DEVICE: Brand: TR BAND

## (undated) DEVICE — 6F .070 XB 3.5 100CM: Brand: VISTA BRITE TIP

## (undated) DEVICE — BOWL PLSTC MD 16OZ BLU STRL

## (undated) DEVICE — ELECTRD DEFIB M/FUNC PROPADZ RADIOL 2PK

## (undated) DEVICE — NC TREK CORONARY DILATATION CATHETER 2.5 MM X 12 MM / RAPID-EXCHANGE: Brand: NC TREK

## (undated) DEVICE — CATH DIAG IMPULSE FL3.5 6F 100CM

## (undated) DEVICE — CATH IMG IVUS EAGLE EYE PLATIN RX DIGITAL .014IN 5FR

## (undated) DEVICE — RUNTHROUGH NS EXTRA FLOPPY PTCA GUIDEWIRE: Brand: RUNTHROUGH

## (undated) DEVICE — CONTRST ISOVUE300 61PCT 50ML

## (undated) DEVICE — PROVE COVER: Brand: UNBRANDED

## (undated) DEVICE — DRSNG SURESITE WNDW 4X4.5